# Patient Record
Sex: FEMALE | Race: OTHER | Employment: UNEMPLOYED | URBAN - METROPOLITAN AREA
[De-identification: names, ages, dates, MRNs, and addresses within clinical notes are randomized per-mention and may not be internally consistent; named-entity substitution may affect disease eponyms.]

---

## 2017-11-11 ENCOUNTER — APPOINTMENT (OUTPATIENT)
Dept: CT IMAGING | Age: 27
End: 2017-11-11
Attending: EMERGENCY MEDICINE
Payer: SUBSIDIZED

## 2017-11-11 ENCOUNTER — HOSPITAL ENCOUNTER (EMERGENCY)
Age: 27
Discharge: HOME | End: 2017-11-12
Attending: EMERGENCY MEDICINE
Payer: SUBSIDIZED

## 2017-11-11 VITALS
WEIGHT: 137.8 LBS | OXYGEN SATURATION: 99 % | TEMPERATURE: 98.5 F | SYSTOLIC BLOOD PRESSURE: 133 MMHG | RESPIRATION RATE: 18 BRPM | DIASTOLIC BLOOD PRESSURE: 89 MMHG | HEART RATE: 88 BPM

## 2017-11-11 DIAGNOSIS — G44.009 MIGRAINE-CLUSTER HEADACHE SYNDROME: Primary | ICD-10-CM

## 2017-11-11 LAB
ALBUMIN SERPL-MCNC: 3.4 G/DL (ref 3.5–5)
ALBUMIN/GLOB SERPL: 0.6 {RATIO} (ref 1.1–2.2)
ALP SERPL-CCNC: 70 U/L (ref 45–117)
ALT SERPL-CCNC: 33 U/L (ref 12–78)
ANION GAP SERPL CALC-SCNC: 11 MMOL/L (ref 5–15)
APTT PPP: 26.4 SEC (ref 22.1–32.5)
AST SERPL-CCNC: 31 U/L (ref 15–37)
BASOPHILS # BLD: 0 K/UL (ref 0–0.1)
BASOPHILS NFR BLD: 0 % (ref 0–1)
BILIRUB SERPL-MCNC: 0.3 MG/DL (ref 0.2–1)
BUN SERPL-MCNC: 10 MG/DL (ref 6–20)
BUN/CREAT SERPL: 17 (ref 12–20)
CALCIUM SERPL-MCNC: 8.5 MG/DL (ref 8.5–10.1)
CHLORIDE SERPL-SCNC: 104 MMOL/L (ref 97–108)
CO2 SERPL-SCNC: 25 MMOL/L (ref 21–32)
CREAT SERPL-MCNC: 0.59 MG/DL (ref 0.55–1.02)
EOSINOPHIL # BLD: 0.1 K/UL (ref 0–0.4)
EOSINOPHIL NFR BLD: 2 % (ref 0–7)
ERYTHROCYTE [DISTWIDTH] IN BLOOD BY AUTOMATED COUNT: 12.6 % (ref 11.5–14.5)
GLOBULIN SER CALC-MCNC: 5.3 G/DL (ref 2–4)
GLUCOSE SERPL-MCNC: 102 MG/DL (ref 65–100)
HCT VFR BLD AUTO: 38.7 % (ref 35–47)
HGB BLD-MCNC: 13.3 G/DL (ref 11.5–16)
INR PPP: 1.1 (ref 0.9–1.1)
LYMPHOCYTES # BLD: 3.5 K/UL (ref 0.8–3.5)
LYMPHOCYTES NFR BLD: 54 % (ref 12–49)
MCH RBC QN AUTO: 28.5 PG (ref 26–34)
MCHC RBC AUTO-ENTMCNC: 34.4 G/DL (ref 30–36.5)
MCV RBC AUTO: 82.9 FL (ref 80–99)
MONOCYTES # BLD: 0.5 K/UL (ref 0–1)
MONOCYTES NFR BLD: 8 % (ref 5–13)
NEUTS SEG # BLD: 2.3 K/UL (ref 1.8–8)
NEUTS SEG NFR BLD: 36 % (ref 32–75)
PLATELET # BLD AUTO: 286 K/UL (ref 150–400)
POTASSIUM SERPL-SCNC: 3.3 MMOL/L (ref 3.5–5.1)
PROT SERPL-MCNC: 8.7 G/DL (ref 6.4–8.2)
PROTHROMBIN TIME: 10.6 SEC (ref 9–11.1)
RBC # BLD AUTO: 4.67 M/UL (ref 3.8–5.2)
SODIUM SERPL-SCNC: 140 MMOL/L (ref 136–145)
THERAPEUTIC RANGE,PTTT: NORMAL SECS (ref 58–77)
WBC # BLD AUTO: 6.5 K/UL (ref 3.6–11)

## 2017-11-11 PROCEDURE — 85025 COMPLETE CBC W/AUTO DIFF WBC: CPT | Performed by: EMERGENCY MEDICINE

## 2017-11-11 PROCEDURE — 85730 THROMBOPLASTIN TIME PARTIAL: CPT | Performed by: EMERGENCY MEDICINE

## 2017-11-11 PROCEDURE — 70450 CT HEAD/BRAIN W/O DYE: CPT

## 2017-11-11 PROCEDURE — 96374 THER/PROPH/DIAG INJ IV PUSH: CPT

## 2017-11-11 PROCEDURE — 85610 PROTHROMBIN TIME: CPT | Performed by: EMERGENCY MEDICINE

## 2017-11-11 PROCEDURE — 96361 HYDRATE IV INFUSION ADD-ON: CPT

## 2017-11-11 PROCEDURE — 74011250636 HC RX REV CODE- 250/636: Performed by: EMERGENCY MEDICINE

## 2017-11-11 PROCEDURE — 99283 EMERGENCY DEPT VISIT LOW MDM: CPT

## 2017-11-11 PROCEDURE — 96375 TX/PRO/DX INJ NEW DRUG ADDON: CPT

## 2017-11-11 PROCEDURE — 36415 COLL VENOUS BLD VENIPUNCTURE: CPT | Performed by: EMERGENCY MEDICINE

## 2017-11-11 PROCEDURE — 80053 COMPREHEN METABOLIC PANEL: CPT | Performed by: EMERGENCY MEDICINE

## 2017-11-11 RX ORDER — METOCLOPRAMIDE HYDROCHLORIDE 5 MG/ML
10 INJECTION INTRAMUSCULAR; INTRAVENOUS
Status: COMPLETED | OUTPATIENT
Start: 2017-11-11 | End: 2017-11-11

## 2017-11-11 RX ORDER — ONDANSETRON 4 MG/1
4 TABLET, ORALLY DISINTEGRATING ORAL
Qty: 15 TAB | Refills: 0 | Status: SHIPPED | OUTPATIENT
Start: 2017-11-11 | End: 2019-01-29

## 2017-11-11 RX ORDER — DIPHENHYDRAMINE HYDROCHLORIDE 50 MG/ML
50 INJECTION, SOLUTION INTRAMUSCULAR; INTRAVENOUS
Status: COMPLETED | OUTPATIENT
Start: 2017-11-11 | End: 2017-11-11

## 2017-11-11 RX ORDER — NAPROXEN 500 MG/1
500 TABLET ORAL 2 TIMES DAILY WITH MEALS
Qty: 30 TAB | Refills: 0 | Status: SHIPPED | OUTPATIENT
Start: 2017-11-11 | End: 2018-05-29

## 2017-11-11 RX ORDER — KETOROLAC TROMETHAMINE 30 MG/ML
30 INJECTION, SOLUTION INTRAMUSCULAR; INTRAVENOUS
Status: COMPLETED | OUTPATIENT
Start: 2017-11-11 | End: 2017-11-11

## 2017-11-11 RX ADMIN — METOCLOPRAMIDE 10 MG: 5 INJECTION, SOLUTION INTRAMUSCULAR; INTRAVENOUS at 22:35

## 2017-11-11 RX ADMIN — SODIUM CHLORIDE 1000 ML: 900 INJECTION, SOLUTION INTRAVENOUS at 22:34

## 2017-11-11 RX ADMIN — DIPHENHYDRAMINE HYDROCHLORIDE 50 MG: 50 INJECTION, SOLUTION INTRAMUSCULAR; INTRAVENOUS at 22:37

## 2017-11-11 RX ADMIN — KETOROLAC TROMETHAMINE 30 MG: 30 INJECTION, SOLUTION INTRAMUSCULAR at 22:37

## 2017-11-12 NOTE — DISCHARGE INSTRUCTIONS
We hope that we have addressed all of your medical concerns. The examination and treatment you received in the Emergency Department were for an emergent problem and were not intended as complete care. It is important that you follow up with your healthcare provider(s) for ongoing care. If your symptoms worsen or do not improve as expected, and you are unable to reach your usual health care provider(s), you should return to the Emergency Department. Today's healthcare is undergoing tremendous change, and patient satisfaction surveys are one of the many tools to assess the quality of medical care. You may receive a survey from the [employer] regarding your experience in the Emergency Department. I hope that your experience has been completely positive, particularly the medical care that I provided. As such, please participate in the survey; anything less than excellent does not meet my expectations or intentions. [address] [place] [address] and [address] [place] [address] participate in nationally recognized quality of care measures. If your blood pressure is greater than 120/80, as reported below, we urge that you seek medical care to address the potential of high blood pressure, commonly known as hypertension. Hypertension can be hereditary or can be caused by certain medical conditions, pain, stress, or \"white coat syndrome. \"       Please make an appointment with your health care provider(s) for follow up of your Emergency Department visit. VITALS:   Patient Vitals for the past 8 hrs:   Temp Pulse Resp BP SpO2   11/11/17 2205 98.5 °F (36.9 °C) 88 18 133/89 99 %          Thank you for allowing us to provide you with medical care today. We realize that you have many choices for your emergency care needs. Please choose us in the future for any continued health care needs. Ambrose David MD    [facility], [place].   Office: [phone]            Recent Results (from the past 24 hour(s))   CBC WITH AUTOMATED DIFF    Collection Time: 11/11/17 10:31 PM   Result Value Ref Range    WBC 6.5 3.6 - 11.0 K/uL    RBC 4.67 3.80 - 5.20 M/uL    HGB 13.3 11.5 - 16.0 g/dL    HCT 38.7 35.0 - 47.0 %    MCV 82.9 80.0 - 99.0 FL    MCH 28.5 26.0 - 34.0 PG    MCHC 34.4 30.0 - 36.5 g/dL    RDW 12.6 11.5 - 14.5 %    PLATELET 436 584 - 345 K/uL    NEUTROPHILS 36 32 - 75 %    LYMPHOCYTES 54 (H) 12 - 49 %    MONOCYTES 8 5 - 13 %    EOSINOPHILS 2 0 - 7 %    BASOPHILS 0 0 - 1 %    ABS. NEUTROPHILS 2.3 1.8 - 8.0 K/UL    ABS. LYMPHOCYTES 3.5 0.8 - 3.5 K/UL    ABS. MONOCYTES 0.5 0.0 - 1.0 K/UL    ABS. EOSINOPHILS 0.1 0.0 - 0.4 K/UL    ABS. BASOPHILS 0.0 0.0 - 0.1 K/UL   METABOLIC PANEL, COMPREHENSIVE    Collection Time: 11/11/17 10:31 PM   Result Value Ref Range    Sodium 140 136 - 145 mmol/L    Potassium 3.3 (L) 3.5 - 5.1 mmol/L    Chloride 104 97 - 108 mmol/L    CO2 25 21 - 32 mmol/L    Anion gap 11 5 - 15 mmol/L    Glucose 102 (H) 65 - 100 mg/dL    BUN 10 6 - 20 MG/DL    Creatinine 0.59 0.55 - 1.02 MG/DL    BUN/Creatinine ratio 17 12 - 20      GFR est AA >60 >60 ml/min/1.73m2    GFR est non-AA >60 >60 ml/min/1.73m2    Calcium 8.5 8.5 - 10.1 MG/DL    Bilirubin, total 0.3 0.2 - 1.0 MG/DL    ALT (SGPT) 33 12 - 78 U/L    AST (SGOT) 31 15 - 37 U/L    Alk. phosphatase 70 45 - 117 U/L    Protein, total 8.7 (H) 6.4 - 8.2 g/dL    Albumin 3.4 (L) 3.5 - 5.0 g/dL    Globulin 5.3 (H) 2.0 - 4.0 g/dL    A-G Ratio 0.6 (L) 1.1 - 2.2     PROTHROMBIN TIME + INR    Collection Time: 11/11/17 10:31 PM   Result Value Ref Range    INR 1.1 0.9 - 1.1      Prothrombin time 10.6 9.0 - 11.1 sec   PTT    Collection Time: 11/11/17 10:31 PM   Result Value Ref Range    aPTT 26.4 22.1 - 32.5 sec    aPTT, therapeutic range     58.0 - 77.0 SECS       Ct Head Wo Cont    Result Date: 11/11/2017  EXAM:  CT HEAD WO CONT Clinical history: Headaches INDICATION:   Headaches COMPARISON: 8/23/2016. CONTRAST:  None. TECHNIQUE: Unenhanced CT of the head was performed using 5 mm images. Brain and bone windows were generated. CT dose reduction was achieved through use of a standardized protocol tailored for this examination and automatic exposure control for dose modulation. FINDINGS: The ventricles and sulci are normal in size, shape and configuration and midline. There is no significant white matter disease. There is no intracranial hemorrhage, extra-axial collection, mass, mass effect or midline shift. The basilar cisterns are open. No acute infarct is identified. The bone windows demonstrate no abnormalities. Right maxillary sinus disease. IMPRESSION: No acute intracranial process. If you see this message, please contact your IT team to request the [employer] Ready RTF Malay and Farsi documents.

## 2017-11-12 NOTE — ED NOTES
Care assumed. The pt., stated her headache has resolved. Family at bedside. Plan of care discussed. Will update provider.

## 2017-11-12 NOTE — ED PROVIDER NOTES
HPI Comments: The patient is a 21-year-old female with a past medical history significant for chronic headaches, presents to the ED with a complaint of intractable headaches for one week localized to the right frontal lobe area, behind the right eye, accompanied by nausea, vomiting, light sensitivity, decreased appetite and activity. The headache is different than her prior headaches. She has taken over-the-counter ibuprofen without any significant relief of symptoms. She denies any fever, cough, congestion, neck pain, back pain, chest pain, shortness of breath, abdominal pain, diarrhea, constipation, dysuria, hematuria, dizziness, extremity weakness or numbness, skin rash, sick contact. Patient is a 24 y.o. female presenting with headaches. Headache           History reviewed. No pertinent past medical history. History reviewed. No pertinent surgical history. History reviewed. No pertinent family history. Social History     Social History    Marital status:      Spouse name: N/A    Number of children: N/A    Years of education: N/A     Occupational History    Not on file. Social History Main Topics    Smoking status: Never Smoker    Smokeless tobacco: Never Used    Alcohol use No    Drug use: No    Sexual activity: Not on file     Other Topics Concern    Not on file     Social History Narrative    No narrative on file         ALLERGIES: Review of patient's allergies indicates no known allergies. Review of Systems   Neurological: Positive for headaches. All other systems reviewed and are negative. Vitals:    11/11/17 2205   BP: 133/89   Pulse: 88   Resp: 18   Temp: 98.5 °F (36.9 °C)   SpO2: 99%   Weight: 62.5 kg (137 lb 12.8 oz)            Physical Exam   Nursing note and vitals reviewed.        CONSTITUTIONAL: Well-appearing; well-nourished; in no apparent distress  HEAD: Normocephalic; atraumatic  EYES: PERRL; EOM intact; conjunctiva and sclera are clear bilaterally. ENT: No rhinorrhea; normal pharynx with no tonsillar hypertrophy; mucous membranes pink/moist, no erythema, no exudate. NECK: Supple; non-tender; no cervical lymphadenopathy  CARD: Normal S1, S2; no murmurs, rubs, or gallops. Regular rate and rhythm. RESP: Normal respiratory effort; breath sounds clear and equal bilaterally; no wheezes, rhonchi, or rales. ABD: Normal bowel sounds; non-distended; non-tender; no palpable organomegaly, no masses, no bruits. Back Exam: Normal inspection; no vertebral point tenderness, no CVA tenderness. Normal range of motion. EXT: Normal ROM in all four extremities; non-tender to palpation; no swelling or deformity; distal pulses are normal, no edema. SKIN: Warm; dry; no rash. NEURO:Alert and oriented x 3, coherent, MAUREEN-XII grossly intact, sensory and motor are non-focal.        MDM  Number of Diagnoses or Management Options  Diagnosis management comments: Assessment: 29-year-old female with atypical headaches , suspect migraine variant rule out ICH. The patient is hemodynamically stable. She has a benign exam with stable vital signs and no deficit at this time.     Plan: Education and reassurance/ lab/ IV fluid/ CT scan of the head/ symptomatic treatment with Tylenol, Reglan, and Benadryl/ serial exam/ monitor and reevaluate       Amount and/or Complexity of Data Reviewed  Clinical lab tests: ordered and reviewed  Tests in the radiology section of CPT®: ordered and reviewed  Tests in the medicine section of CPT®: reviewed and ordered  Discussion of test results with the performing providers: yes  Decide to obtain previous medical records or to obtain history from someone other than the patient: yes  Obtain history from someone other than the patient: yes  Review and summarize past medical records: yes  Discuss the patient with other providers: yes  Independent visualization of images, tracings, or specimens: yes    Risk of Complications, Morbidity, and/or Mortality  Presenting problems: moderate  Diagnostic procedures: moderate  Management options: moderate    Patient Progress  Patient progress: stable    ED Course       Procedures     Progress Note:   Pt has been reexamined by Arik Bacon MD. Pt is feeling much better. Symptoms have improved. All available results have been reviewed with pt and any available family. Pt understands sx, dx, and tx in ED. Care plan has been outlined and questions have been answered. Pt is ready to go home. Will send home on  Migraine headaches instruction. Prescriptions of Zofran and naproxen. Outpatient referral with PCP/ neurology as needed. Written by Arik Bacon MD,12:01 AM    .   .

## 2017-11-12 NOTE — ED TRIAGE NOTES
TRIAGE NOTE: Pt arrives for headache since Tuesday with pain behind right eye with vomiting \"every time I eat\".   Primary language is Chinese triage completed via

## 2017-11-12 NOTE — ED NOTES
Pt., discharged with instructions and follow up information. IV removed intact. Prescriptions discussed and  verbalized understanding.

## 2017-12-07 ENCOUNTER — HOSPITAL ENCOUNTER (EMERGENCY)
Age: 27
Discharge: HOME | End: 2017-12-07
Attending: EMERGENCY MEDICINE
Payer: SUBSIDIZED

## 2017-12-07 VITALS
DIASTOLIC BLOOD PRESSURE: 67 MMHG | HEART RATE: 78 BPM | SYSTOLIC BLOOD PRESSURE: 125 MMHG | OXYGEN SATURATION: 99 % | RESPIRATION RATE: 18 BRPM | WEIGHT: 141 LBS | TEMPERATURE: 98.3 F

## 2017-12-07 DIAGNOSIS — R51.9 HEADACHE ABOVE THE EYE REGION: Primary | ICD-10-CM

## 2017-12-07 PROCEDURE — 74011250636 HC RX REV CODE- 250/636: Performed by: EMERGENCY MEDICINE

## 2017-12-07 PROCEDURE — 74011000258 HC RX REV CODE- 258: Performed by: EMERGENCY MEDICINE

## 2017-12-07 PROCEDURE — 99283 EMERGENCY DEPT VISIT LOW MDM: CPT

## 2017-12-07 PROCEDURE — 96374 THER/PROPH/DIAG INJ IV PUSH: CPT

## 2017-12-07 RX ORDER — BUTALBITAL, ACETAMINOPHEN AND CAFFEINE 300; 40; 50 MG/1; MG/1; MG/1
1 CAPSULE ORAL
Qty: 15 CAP | Refills: 0 | Status: SHIPPED | OUTPATIENT
Start: 2017-12-07 | End: 2019-01-29

## 2017-12-07 RX ADMIN — PROCHLORPERAZINE EDISYLATE 10 MG: 5 INJECTION INTRAMUSCULAR; INTRAVENOUS at 10:49

## 2017-12-07 NOTE — ED NOTES
MD reviewed discharge instructions and options with patient and patient verbalized understanding. RN reviewed discharge instructions using teachback method. Pt ambulated to exit without difficulty and in no signs of acute distress escorted by self, and he  will drive home. No complaints or needs expressed at this time. Patient was counseled on medications prescribed at discharge. VSS, verbalized relief from most intense pain. Patient to call Dr. Amy Robins in the morning for appointment.

## 2017-12-07 NOTE — ED TRIAGE NOTES
TRIAGE: Patient arrives ambulatory from home with a headache. Seen 11/11 and prescribed Naproxen and Zofran for headaches. She feels as though this is not helping. Has not taken a dose today. + Nausea.

## 2017-12-07 NOTE — DISCHARGE INSTRUCTIONS
ÇáÕÏÇÚ: ÅÑÔÇÏÇÊ ÇáÑÚÇíÉ  [ Headache: Care Instructions ]  ÅÑÔÇÏÇÊ ÇáÑÚÇíÉ    ááÕÏÇÚ ÇáÚÏíÏ ãä ÇáÃÓÈÇÈ VUBLFSFD. áÇ ÊÚÏ ãÚÙã ÍÇáÇÊ ÇáÕÏÇÚ ÚáÇãÉ Úáì ãÔßáÉ ÃßËÑ ÎØæÑÉ æÊÊÍÓä ÈãÝÑÏåÇ Ïæä ÊÏÎá. ÞÏ íÓÇÚÏß ÇáÚáÇÌ ÇáãäÒáí Úáì ÇáÔÚæÑ LQTQESS ÈÔßá ÃÓÑÚ. áÞÏ ÝÍÕß ÇáØÈíÈ ÝÍÕðÇ ÏÞíÞðÇ¡ æáßä ÞÏ ÊÊØæÑ ÇáÃÚÑÇÖ áÇÍÞðÇ. ÝÅÐÇ ãÇ áÇÍÙÊ ÃíÉ UGUHLT Ãæ ÃÚÑÇÖ ÌÏíÏÉ¡ ÝÇØáÈ IVQEDK ÇáØÈí ÝæÑðÇ. Åä ãÊÇÈÚÉ ÇáÑÚÇíÉ ÌÒÁ ÑÆíÓí Ýí ÚáÇÌß æÓáÇãÊß. ÝÇÍÑÕ Úáì ÅÌÑÇÁ ÊÑÊíÈÇÊ ÌãíÚ ãæÇÚíÏ ÒíÇÑÉ ÇáØÈíÈ æÇáÊÒã ÈÊáß ÇáãæÇÚíÏ æÇÊÕá ÈØÈíÈß ÅÐÇ ßÇäÊ áÏíß ãÔßáÇÊ. æãä ÇáÃÝßÇÑ ÇáÌíÏÉ ÃíÖðÇ ãÚÑÝÉ äÊÇÆÌ EEJWDJXJ ALVVYHWOC ÈÞÇÆãÉ XXFECEQI ÇáÊí JFOBWUIW. ßíÝ ÊÚÊäí ÈäÝÓß Ýí ZWFLWZ¿   · áÇ ÊÞõÏ ÇáÓíÇÑÉ ÅÐÇ ßäÊ ÞÏ ÊäÇæáÊ ÏæÇÁð ãä ÃÏæíÉ ÊÓßíä ÇáÃáã ÇáÊí ÊõÕÑÝ ÈæÕÝÉ ØÈíÉ.  · ÇÓÊÑÍ Ýí ÛÑÝÉ åÇÏÆÉ æãäÎÝÖÉ ÇáÅÖÇÁÉ Åáì Ãä íÐåÈ ÇáÕÏÇÚ. ÃÛáÞ Úíäíß æÍÇæá ÇáÇÓÊÑÎÇÁ Ãæ ÇÎáÏ ááäæã. áÇ ÊÞÑÃ Ãæ ÊÔÇåÏ ÇáÊáÝÇÒ.  · ÖÚ ÞØÚÉ ÞãÇÔ ÈÇÑÏÉ æÑØÈÉ Ãæ ßãÇÏÉ Úáì ãäØÞÉ ÇáÃáã áãÏÉ ãä 10 ÏÞÇÆÞ Åáì 20 ÏÞíÞÉ Ýí ÇáãÑÉ ÇáæÇÍÏÉ. ÖÚ ÞØÚÉ ÞãÇÔ ÑÞíÞÉ Èíä ÇáßãÇÏÉ æÌáÏß.  · ÇÓÊÎÏã ãäÔÝÉ ÏÇÝÆÉ æÑÇØÈÉ Ãæ æÓÇÏÉ ÍÑÇÑíÉ Úáì ÇáæÖÚ ÇáãäÎÝÖ áÅÑÎÇÁ ÚÖáÇÊ ÇáßÊÝ æÇáÑÞÈÉ ÇáãÔÏæÏÉ.  · ÇÌÚá ÃÍÏ ÇáÃÔÎÇÕ íÞæã ÈÊÏáíß ÑÞÈÊß æßÊÝíß ÈÑÝÞ.  · ÊäÇæá ÃÏæíÉ ÊÓßíä ÇáÃáã ÈÏÞÉ æÝÞ ÅÑÔÇÏÇÊ ÇáØÈíÈ. o o ÅÐÇ ÃÚØÇß ÇáØÈíÈ ÏæÇÁð ãä ÇáÃÏæíÉ ÇáÊí ÊõÕÑÝ ÈæÕÝÉ ØÈíÉ áÚáÇÌ ÇáÃáã¡ GPOYRLH æÝÞ ÅÑÔÇÏÇÊå. o o ÅÐÇ áã ÊÃÎÐ ÏæÇÁð áÚáÇÌ ÇáÃáã ãä ÇáÃÏæíÉ ÇáÊí ÊõÕÑÝ ÈæÕÝÉ ØÈíÉ¡ ÝÓá ØÈíÈß ÅÐÇ ßÇä ÈÅãßÇäß ÃÎÐ ÏæÇÁ ãä ÇáÃÏæíÉ ÇáÊí ÊõÕÑÝ ÈÏæä æÕÝÉ ØÈíÉ.  · ÊæÎøó ÇáÍÐÑ æáÇ ÊÃÎÐ ÃÏæíÉ ÊÎÝíÝ ÇáÃáã ÈãÇ íÒíÏ ÚãÇ ÊÓãÍ Èå EYQOCORGY¡ ÍíË ÞÏ íÊÝÇÞã ÇáÕÏÇÚ Ãæ íÊßÑÑ ÚäÏãÇ íÞá ÊÃËíÑ ÇáÏæÇÁ ÊÏÑíÌíðÇ.  · áÇ ÊÊÌÇåá ÇáÃÚÑÇÖ ÇáÌÏíÏÉ ÇáÊí ÊÍÏË ãÚ ÇáÕÏÇÚ ãËá ÇáÍãì Ãæ ÇáÖÚÝ Ãæ ÇáÊäãíá Ãæ ÊÛíÑÇÊ ÇáÑÄíÉ Ãæ ÇáÇÑÊÈÇß. ÍíË ÅäåÇ ÞÏ ÊãËá ÚáÇãÇÊ áãÔßáÉ ÃßËÑ ÎØæÑÉ.  ááæÞÇíÉ ãä ÇáÕÏÇÚ   · ÇÍÊÝÙ ÈíæãíÉ ááÕÏÇÚ ÈÍíË íãßäß ÊÍÏíÏ ãËíÑÇÊ ÇáÕÏÇÚ. ÞÏ íÓÇÚÏß ÊÌäÈ ÇáãËíÑÇÊ Ýí ÇáæÞÇíÉ ãä ÇáÕÏÇÚ.  ÓÌøöá æÞÊ ÈÏÇíÉ ßá ÕÏÇÚ æãÏÉ WYYLLSRD æÇáÔßá ÇáÐí Úáíå (äÇÈÖ Ãæ ãÄáã Ãæ æÎÒí Ãæ ÛíÑ ÍÇÏ). ÓÌøöá ÃíÉ ÃÚÑÇÖ ÃÎÑì ÊÕÇÍÈ ÇáÕÏÇÚ ãËá ÇáÛËíÇä Ãæ ÇáÃÖæÇÁ ÇáæÇãÖÉ Ãæ ÇáÈÞÚ ÇáÓæÏÇÁ Ãæ ÇáÍÓÇÓíÉ ááÖæÁ ÇáÓÇØÚ Ãæ ÇáÖÌíÌ. áÇÍÙí ÅÐÇ ãÇ ßäÊ ÊÕÇÈíä ÈÇáÕÏÇÚ ÚäÏãÇ íÞÊÑÈ ãæÚÏ ÏæÑÊß ÇáÔåÑíÉ (PGYMZCA ááäÓÇÁ). ÓÌøöá ÇáÃÔíÇÁ ÇáÊí ÞÏ ÊËíÑ ÇáÕÏÇÚ ãËá ÇáÃØÚãÉ Ãæ ÇáÑæÇÆÍ ÇáãÚíäÉ ([id number] æÇáÌÈä æÇáäÈíÐ) Ãæ ÇáÏÎÇä Ãæ ÇáÅÖÇÁÉ ÇáÓÇØÚÉ Ãæ ÇáÖÛØ Ãæ ÞáÉ Çáäæã.  · ÇßÊÔÝ ÇáØÑÞ ÇáÕÍíÉ ááÊÚÇãá ãÚ ÇáÖÛæØ. íÍÏË ÇáÕÏÇÚ ÈÔßá ÔÇÆÚ ÃËäÇÁ ÇáÃæÞÇÊ ÇáÚÕíÈÉ Ãæ ÈÚÏåÇ ãÈÇÔÑÉ. ÎÐ æÞÊß ááÇÓÊÑÎÇÁ ÞÈá æÈÚÏ Ãä ÊÞæã ÈÝÚá ÔíÁ ÞÏ ÓÈÈ áß ÕÏÇÚðÇ Ýí ÇáãÇÖí.  · ÍÇæá ÇáÍÝÇÙ Úáì ÇÓÊÑÎÇÁ ÚÖáÇÊß ÈÇáÍÝÇÙ Úáì æÖÚíÉ ÌíÏÉ ááÌÓã. ÇÝÍÕ ÚÖáÇÊ ÇáÝß æÇáæÌå æÇáÑÞÈÉ æÇáßÊÝ áãÚÑÝÉ ãÇ ÅÐÇ ßÇä ÈåÇ ÔÏ æÍÇæá ÅÑÎÇÁåÇ. ÚäÏ ÇáÌáæÓ Úáì ãßÊÈ¡ ÛíøöÑ ÚÇÏÉó æÖÚíÇÊ ÌÓãß ÈÔßá ãÊßÑÑ æÞã ÈÅÌÑÇÁ ÊãÑíä VPBYGDPOL áãÏÉ 30 ËÇäíÉ ßá ÓÇÚÉ.  · ÇÍÕá Úáì ÞÓØ æÇÝÑ ãä Çáäæã æÇáÊãÇÑíä.  · ÊäÇæá ÇáØÚÇã ÈÇäÊÙÇã æÈÔßá ÌíÏ. íãßä Ãä ÊÍÝÒ ÇáÝÊÑÇÊ ÇáØæíáÉ ÈÏæä ØÚÇã ÍÏæË ÇáÕÏÇÚ.  · ÚÇáÌ äÝÓß OYYDRQEN. íÌÏ ÈÚÖ ÇáäÇÓ Ãä ÇáÊÏáíß ÇáãäÊÙã ãÝíÏ ÌÏðÇ Ýí ÊÎÝíÝ ÇáÊæÊÑ.  · Þáøöá ãä ÇáßÇÝííä ÈÚÏã ÊäÇæá ßãíÇÊ ßÈíÑÉ ãä ÇáÞåæÉ Ãæ ÇáÔÇí Ãæ ÇáÕæÏÇ. æáßä áÇ ÊÞáÚ Úä ÇáßÇÝííä ÝÌÃÉ¡ áÃä åÐÇ ÃíÖðÇ ÞÏ íÓÈÈ áß ÕÏÇÚðÇ.  · Þáøöá ãä ÅÌåÇÏ ÇáÚíä ÇáäÇÊÌ Úä ÃÔÚÉ ÇáßãÈíæÊÑ ÈÇáæãíÖ ÇáãÊßÑÑ æÇáäÙÑ ÈÚíÏðÇ Úä ÔÇÔÉ ÇáßãÈíæÊÑ Èíä ÇáÍíä æÇáÂÎÑ. ÊÃßÏ ãä ÇÑÊÏÇÁ ÇáäÙÇÑÉ ÇáãäÇÓÈÉ æãä Ãä ÚÇÑÖ ÇáÔÇÔÉ ãËÈÊ LHEEQV ÇáãáÇÆã Úáì ÈÚÏ Øæáå ÐÑÇÚ ÊÞÑíÈðÇ.  · [address] YRFPNCHB ÅÐÇ ßäÊ ãÕÇÈðÇ XFIGEMPNF Ãæ ÇáÞáÞ. ÝÞÏ íßæä ÇáÕÏÇÚ ÇáÐí ÊÚÇäí ãäå ãÑÊÈØðÇ ÈåÐå OVDUOTG. íãßä Ãä íÄÏí WUFABJ Åáì ßáò ãä ÇáæÞÇíÉ ãä ÇáÕÏÇÚ æÇáãÓÇÚÏÉ Ýí ÇáÊÎáÕ ãä ÃÚÑÇÖ ÇáÞáÞ Ãæ ÇáÇßÊÆÇÈ. ãÊì íÌÈ NZIXOER áØáÈ ÇáãÓÇÚÏÉ¿  ÇÊÕá ÈÇáÑÞã 911 Ýí Ãí æÞÊ ÊÑì Ýíå ÍÇÌÊß Åáì ÑÚÇíÉ ØÇÑÆÉ. ÝãËáÇð¡ ÇÊÕá Ýí ÍÇáÉ:   · ÙåÑÊ Úáíß ÚáÇãÇÊ ÇáÓßÊÉ ÇáÏãÇÛíÉ. æÊÔãá åÐå ÇáÂËÇÑ ÇáÌÇäÈíÉ ãÇ íáí:   o o AOENRBI Ãæ ÔááÇð Ãæ ÖÚÝðÇ ãÝÇÌÆóÇ Ýí ÇáæÌå Ãæ ÇáÐÑÇÚ Ãæ ÇáÓÇÞ ÎÇÕÉ Úáì ÌÇäÈ æÇÍÏ ÝÞØ ãä ÌÓãß. o o ÊÛíÑÇÊ ãÝÇÌÆÉ Ýí ÇáÑÄíÉ. o o ãÔßáÇÊ ãÝÇÌÆÉ Ýí ÇáÊÍÏË.   o o PSDICJEM Ãæ DCQFWCIA ãÝÇÌÆ Ýí Ýåã ROBBIN ÇáÈÓíØÉ. o o ãÔßáÇÊ ãÝÇÌÆÉ Ýí ÇáãÔí Ãæ ÇáÊæÇÒä. o o ÕÏÇÚðÇ ãÝÇÌÆðÇ æÍÇÏðÇ íÎÊáÝ Úä ÃÔßÇá ÇáÕÏÇÚ ÇáÓÇÈÞÉ. ÇÊÕá ÈØÈíÈß ÇáÂä Ãæ ÇÈÍË Úä ÇáÑÚÇíÉ ÇáØÈíÉ ÇáÝæÑíÉ Ýí DUGGXTK ÇáÊÇáíÉ:   · ÅÕÇÈÊß ÈäæÈÉ ÕÏÇÚ ÌÏíÏÉ Ãæ ÊÝÇÞã ÍÏÉ ÇáÕÏÇÚ.  · ÊÝÇÞã ÇáÕÏÇÚ. Ãíä íãßä ãÚÑÝÉ ÇáãÒíÏ¿  ÇäÊÞÇá Åáì [email or web address] ÏÎæá M271 íãßäß ãÚÑÝÉ ÇáãÒíÏ ãä ÎáÇá ãÑÈÚ ÇáÈÍË \"ÇáÕÏÇÚ: ÅÑÔÇÏÇÊ ÇáÑÚÇíÉ - [ Headache: Care Instructions ]. \"  © 5725-1434 [employer], [employer]. ÊãÊ ÊåíÆÉ ÅÑÔÇÏÇÊ ÇáÚäÇíÉ ÈãæÌÈ ÊÑÎíÕ ãä ãÎÊÕ ÇáÑÚÇíÉ ÇáÕÍíÉ áÏíß. ÅÐÇ ßÇäÊ áÏíß ÃíÉ VHUUFDTVG Úä ÍÇáÉ ØÈíÉ Ãæ Ãí ãä åÐå DXQOJWWBE¡ ÝÊæÌå ÏæãðÇ HWRTOVF Åáì ãÎÊÕ ÇáÑÚÇíÉ ÇáÕÍíÉ. ÊäÝí ãäÙãÉ [employer] Karen Vargas Ãæ Carlos Pop [id number] åÐå EOVNKIDOI.   ÅÕÏÇÑ ÇáãÍÊæì: 11.4 ãÍÏøË ÇÚÊÈÇÑðÇ ãä: 13 ãÍÑã 1438

## 2017-12-07 NOTE — ED PROVIDER NOTES
HPI Comments: 24 y.o. female with no significant past medical history who presents from home via private vehicle with chief complaint of headache. Pt reports that she was seen in the ED ~ 1 month ago with HA and was discharged home on Zofran and naproxen. She reports improvement and that that HA resolved, but she has had return of similar sx yesterday. Pt notes her current HA is right frontal and constant. Pain is unrelieved with naproxen and Zofran. Pt denies that she frequently gets HA's. Pt denies any syncope, rhinorrhea, fever, or cough. There are no other acute medical concerns at this time. Social hx: Never smoker. No alcohol use. PCP: Gisel Mcclelland MD    Chart Review: Pt was seen in the ED on 11/11/17. Head CT at that time showed no acute intracranial process. Note written by Brett Wade, as dictated by Tone Maza MD 10:00 AM      The history is provided by the patient. A  was used (Wannyi (Mongolian)). History reviewed. No pertinent past medical history. History reviewed. No pertinent surgical history. History reviewed. No pertinent family history. Social History     Social History    Marital status:      Spouse name: N/A    Number of children: N/A    Years of education: N/A     Occupational History    Not on file. Social History Main Topics    Smoking status: Never Smoker    Smokeless tobacco: Never Used    Alcohol use No    Drug use: No    Sexual activity: Not on file     Other Topics Concern    Not on file     Social History Narrative         ALLERGIES: Review of patient's allergies indicates no known allergies. Review of Systems    Vitals:    12/07/17 0901   BP: 124/86   Pulse: 91   Resp: 18   Temp: 98.1 °F (36.7 °C)   SpO2: 99%   Weight: 64 kg (141 lb)            Physical Exam   Constitutional: She appears well-developed and well-nourished. HENT:   Head: Normocephalic and atraumatic.    Mouth/Throat: Oropharynx is clear and moist.   Eyes: EOM are normal. Pupils are equal, round, and reactive to light. Neck: Normal range of motion. Neck supple. Cardiovascular: Normal rate, regular rhythm, normal heart sounds and intact distal pulses. Exam reveals no gallop and no friction rub. No murmur heard. Pulmonary/Chest: Effort normal. No respiratory distress. She has no wheezes. She has no rales. Abdominal: Soft. There is no tenderness. There is no rebound. Musculoskeletal: Normal range of motion. She exhibits no tenderness. Neurological: She is alert. No cranial nerve deficit. Motor; symmetric   Skin: No erythema. Psychiatric: She has a normal mood and affect. Her behavior is normal.   Nursing note and vitals reviewed. Note written by Brett Gomez, as dictated by Yenny Rose MD 10:04 AM      [facility]  ED Course       Procedures    PROGRESS NOTE:  11:17 AM  Pt states that her HA feels better and she would like to go home at this time.

## 2018-02-20 ENCOUNTER — HOSPITAL ENCOUNTER (EMERGENCY)
Age: 28
Discharge: HOME | End: 2018-02-20
Attending: EMERGENCY MEDICINE
Payer: SUBSIDIZED

## 2018-02-20 VITALS
TEMPERATURE: 98.4 F | SYSTOLIC BLOOD PRESSURE: 118 MMHG | BODY MASS INDEX: 27.84 KG/M2 | HEART RATE: 89 BPM | WEIGHT: 141.8 LBS | DIASTOLIC BLOOD PRESSURE: 85 MMHG | OXYGEN SATURATION: 99 % | HEIGHT: 60 IN | RESPIRATION RATE: 16 BRPM

## 2018-02-20 DIAGNOSIS — K02.9 PAIN DUE TO DENTAL CARIES: Primary | ICD-10-CM

## 2018-02-20 PROCEDURE — 99283 EMERGENCY DEPT VISIT LOW MDM: CPT

## 2018-02-20 RX ORDER — IBUPROFEN 600 MG/1
600 TABLET ORAL
Qty: 20 TAB | Refills: 0 | Status: SHIPPED | OUTPATIENT
Start: 2018-02-20 | End: 2018-05-29

## 2018-02-20 RX ORDER — PENICILLIN V POTASSIUM 500 MG/1
500 TABLET, FILM COATED ORAL 4 TIMES DAILY
Qty: 28 TAB | Refills: 0 | Status: SHIPPED | OUTPATIENT
Start: 2018-02-20 | End: 2018-02-27

## 2018-02-20 NOTE — ED PROVIDER NOTES
Patient is a 24 y.o. female presenting with dental problem. Dental Pain         Pt c/po left lower dental pain for 3-4 days. Denies any fever, difficulty breathing, difficulty swallowing, SOB or chest pain. Denies any dental injury. Denies any facial swelling or facial redness. Denies any bleeding gums. Pt states that she has not been to a dentist in years. Pt. Reports that she has not had any medications today prior to arrival.      Past Medical History:   Diagnosis Date    Headache        Past Surgical History:   Procedure Laterality Date    HX GYN               History reviewed. No pertinent family history. Social History     Social History    Marital status:      Spouse name: N/A    Number of children: N/A    Years of education: N/A     Occupational History    Not on file. Social History Main Topics    Smoking status: Never Smoker    Smokeless tobacco: Never Used    Alcohol use No    Drug use: No    Sexual activity: Not on file     Other Topics Concern    Not on file     Social History Narrative         ALLERGIES: Review of patient's allergies indicates no known allergies. Review of Systems   Constitutional: Negative for activity change and appetite change. HENT: Positive for dental problem. Negative for facial swelling, sore throat and trouble swallowing. Eyes: Negative. Respiratory: Negative for shortness of breath. Cardiovascular: Negative. Gastrointestinal: Negative for abdominal pain, diarrhea and vomiting. Genitourinary: Negative for dysuria. Musculoskeletal: Negative for back pain and neck pain. Skin: Negative for color change. Neurological: Negative for headaches. Psychiatric/Behavioral: Negative.         Vitals:    18 1040   BP: 118/85   Pulse: 89   Resp: 16   Temp: 98.4 °F (36.9 °C)   SpO2: 99%   Weight: 64.3 kg (141 lb 12.8 oz)   Height: 5' (1.524 m)            Physical Exam   Constitutional: She is oriented to person, place, and time. She appears well-nourished. [place] female; non smoker   HENT:   Head: Normocephalic. Mouth/Throat: Oropharynx is clear and moist.   Reports lower left dental pain; multiple teeth are missing; remaining teeth are in poor repair   Eyes: Pupils are equal, round, and reactive to light. Neck: Normal range of motion. Neck supple. Cardiovascular: Normal rate and regular rhythm. Pulmonary/Chest: Effort normal and breath sounds normal.   Abdominal: Bowel sounds are normal.   Musculoskeletal: Normal range of motion. Lymphadenopathy:     She has no cervical adenopathy. Neurological: She is alert and oriented to person, place, and time. Skin: Skin is warm and dry. No rash noted. Nursing note and vitals reviewed. [facility]      ED Course       Procedures         Mouth care and emergency dental care sheets were reviewed with the pt.  11:10 AM  Patient's results and plan of care have been reviewed with her and her . Patient and/or family have verbally conveyed their understanding and agreement of the patient's signs, symptoms, diagnosis, treatment and prognosis and additionally agree to follow up as recommended or return to the Emergency Room should her condition change prior to follow-up. Discharge instructions have also been provided to the patient with some educational information regarding her diagnosis as well a list of reasons why she would want to return to the ER prior to her  follow-up appointment should her condition change. Dony Norton NP

## 2018-02-20 NOTE — DISCHARGE INSTRUCTIONS
Dental Pain: After Your Visit  Your Care Instructions  The most common cause of dental pain is tooth decay. It can also be caused by an infection of the tooth (abscess) or gum, a tooth that has not broken all the way through the gum (impacted tooth), or a problem with the nerve-filled center of the tooth. Follow-up care is a key part of your treatment and safety. Be sure to make and go to all appointments, and call your doctor if you are having problems. Its also a good idea to know your test results and keep a list of the medicines you take. How can you care for yourself at home? · Contact a dentist for follow-up care. · Put ice or a cold pack on the outside of your mouth for 10 to 20 minutes at a time to reduce pain and swelling. Put a thin cloth between the ice and your skin. · Take an over-the-counter pain medicine, such as acetaminophen (Tylenol), ibuprofen (Advil, Motrin), or naproxen (Aleve). Read and follow all instructions on the label. · Do not take two or more pain medicines at the same time unless the doctor told you to. Many pain medicines have acetaminophen, which is Tylenol. Too much acetaminophen (Tylenol) can be harmful. · Rinse your mouth with warm salt water every 2 hours to help relieve pain and swelling from an infected tooth. Mix 1 teaspoon of salt in 8 ounces of water. · If your doctor prescribed antibiotics, take them as directed. Do not stop taking them just because you feel better. You need to take the full course of antibiotics. When should you call for help? Call your doctor now or seek immediate medical care if:  · You have signs of infection, such as:  ¨ Increased pain, swelling, warmth, or redness. ¨ Pus draining from the gum, tooth, or face. ¨ A fever. Watch closely for changes in your health, and be sure to contact your doctor if:  · You do not get better as expected. Where can you learn more?    Go to [email or web address]  Enter O364 in the search box to learn more about \"Dental Pain: After Your Visit. \"   © 9585-2924 [employer]. Care instructions adapted under license by [employer] (which disclaims liability or warranty for this information). This care instruction is for use with your licensed healthcare professional. If you have questions about a medical condition or this instruction, always ask your healthcare professional. [address] any warranty or liability for your use of this information. Content Version: 99.2.762926;  Last Revised: May 17, 2013

## 2018-05-29 ENCOUNTER — HOSPITAL ENCOUNTER (EMERGENCY)
Age: 28
Discharge: HOME | End: 2018-05-29
Attending: STUDENT IN AN ORGANIZED HEALTH CARE EDUCATION/TRAINING PROGRAM | Admitting: STUDENT IN AN ORGANIZED HEALTH CARE EDUCATION/TRAINING PROGRAM
Payer: MEDICAID

## 2018-05-29 ENCOUNTER — APPOINTMENT (OUTPATIENT)
Dept: ULTRASOUND IMAGING | Age: 28
End: 2018-05-29
Attending: STUDENT IN AN ORGANIZED HEALTH CARE EDUCATION/TRAINING PROGRAM
Payer: MEDICAID

## 2018-05-29 VITALS
BODY MASS INDEX: 27.26 KG/M2 | WEIGHT: 144.38 LBS | HEART RATE: 87 BPM | OXYGEN SATURATION: 99 % | DIASTOLIC BLOOD PRESSURE: 69 MMHG | RESPIRATION RATE: 18 BRPM | TEMPERATURE: 98.9 F | HEIGHT: 61 IN | SYSTOLIC BLOOD PRESSURE: 118 MMHG

## 2018-05-29 DIAGNOSIS — O46.8X1 SUBCHORIONIC HEMORRHAGE OF PLACENTA IN FIRST TRIMESTER, SINGLE OR UNSPECIFIED FETUS: ICD-10-CM

## 2018-05-29 DIAGNOSIS — O41.8X10 SUBCHORIONIC HEMORRHAGE OF PLACENTA IN FIRST TRIMESTER, SINGLE OR UNSPECIFIED FETUS: ICD-10-CM

## 2018-05-29 DIAGNOSIS — Z32.01 PREGNANCY TEST PERFORMED, PREGNANCY CONFIRMED: Primary | ICD-10-CM

## 2018-05-29 DIAGNOSIS — O20.0 THREATENED ABORTION: ICD-10-CM

## 2018-05-29 LAB
ABO + RH BLD: NORMAL
ALBUMIN SERPL-MCNC: 3.5 G/DL (ref 3.5–5)
ALBUMIN/GLOB SERPL: 0.7 {RATIO} (ref 1.1–2.2)
ALP SERPL-CCNC: 79 U/L (ref 45–117)
ALT SERPL-CCNC: 25 U/L (ref 12–78)
ANION GAP SERPL CALC-SCNC: 9 MMOL/L (ref 5–15)
APPEARANCE UR: ABNORMAL
AST SERPL-CCNC: 27 U/L (ref 15–37)
BACTERIA URNS QL MICRO: ABNORMAL /HPF
BASOPHILS # BLD: 0 K/UL (ref 0–0.1)
BASOPHILS NFR BLD: 1 % (ref 0–1)
BILIRUB SERPL-MCNC: 0.3 MG/DL (ref 0.2–1)
BILIRUB UR QL: NEGATIVE
BLOOD BANK CMNT PATIENT-IMP: NORMAL
BUN SERPL-MCNC: 11 MG/DL (ref 6–20)
BUN/CREAT SERPL: 22 (ref 12–20)
CALCIUM SERPL-MCNC: 8.8 MG/DL (ref 8.5–10.1)
CAOX CRY URNS QL MICRO: ABNORMAL
CHLORIDE SERPL-SCNC: 106 MMOL/L (ref 97–108)
CO2 SERPL-SCNC: 22 MMOL/L (ref 21–32)
COLOR UR: ABNORMAL
CREAT SERPL-MCNC: 0.49 MG/DL (ref 0.55–1.02)
DIFFERENTIAL METHOD BLD: NORMAL
EOSINOPHIL # BLD: 0.1 K/UL (ref 0–0.4)
EOSINOPHIL NFR BLD: 2 % (ref 0–7)
EPITH CASTS URNS QL MICRO: ABNORMAL /LPF
ERYTHROCYTE [DISTWIDTH] IN BLOOD BY AUTOMATED COUNT: 12.8 % (ref 11.5–14.5)
GLOBULIN SER CALC-MCNC: 5.2 G/DL (ref 2–4)
GLUCOSE SERPL-MCNC: 79 MG/DL (ref 65–100)
GLUCOSE UR STRIP.AUTO-MCNC: NEGATIVE MG/DL
HCG SERPL-ACNC: 2797 MIU/ML (ref 0–6)
HCG UR QL: POSITIVE
HCT VFR BLD AUTO: 37.3 % (ref 35–47)
HGB BLD-MCNC: 12.4 G/DL (ref 11.5–16)
HGB UR QL STRIP: ABNORMAL
IMM GRANULOCYTES # BLD: 0 K/UL (ref 0–0.04)
IMM GRANULOCYTES NFR BLD AUTO: 0 % (ref 0–0.5)
KETONES UR QL STRIP.AUTO: NEGATIVE MG/DL
LEUKOCYTE ESTERASE UR QL STRIP.AUTO: ABNORMAL
LYMPHOCYTES # BLD: 2.7 K/UL (ref 0.8–3.5)
LYMPHOCYTES NFR BLD: 49 % (ref 12–49)
MCH RBC QN AUTO: 28.4 PG (ref 26–34)
MCHC RBC AUTO-ENTMCNC: 33.2 G/DL (ref 30–36.5)
MCV RBC AUTO: 85.4 FL (ref 80–99)
MONOCYTES # BLD: 0.5 K/UL (ref 0–1)
MONOCYTES NFR BLD: 8 % (ref 5–13)
NEUTS SEG # BLD: 2.2 K/UL (ref 1.8–8)
NEUTS SEG NFR BLD: 40 % (ref 32–75)
NITRITE UR QL STRIP.AUTO: NEGATIVE
NRBC # BLD: 0 K/UL (ref 0–0.01)
NRBC BLD-RTO: 0 PER 100 WBC
PH UR STRIP: 6 [PH] (ref 5–8)
PLATELET # BLD AUTO: 227 K/UL (ref 150–400)
PMV BLD AUTO: 9.7 FL (ref 8.9–12.9)
POTASSIUM SERPL-SCNC: 3.8 MMOL/L (ref 3.5–5.1)
PROT SERPL-MCNC: 8.7 G/DL (ref 6.4–8.2)
PROT UR STRIP-MCNC: NEGATIVE MG/DL
RBC # BLD AUTO: 4.37 M/UL (ref 3.8–5.2)
RBC #/AREA URNS HPF: ABNORMAL /HPF (ref 0–5)
SODIUM SERPL-SCNC: 137 MMOL/L (ref 136–145)
SP GR UR REFRACTOMETRY: 1.02 (ref 1–1.03)
UR CULT HOLD, URHOLD: NORMAL
UROBILINOGEN UR QL STRIP.AUTO: 0.2 EU/DL (ref 0.2–1)
WBC # BLD AUTO: 5.5 K/UL (ref 3.6–11)
WBC URNS QL MICRO: ABNORMAL /HPF (ref 0–4)

## 2018-05-29 PROCEDURE — 84702 CHORIONIC GONADOTROPIN TEST: CPT | Performed by: STUDENT IN AN ORGANIZED HEALTH CARE EDUCATION/TRAINING PROGRAM

## 2018-05-29 PROCEDURE — 85025 COMPLETE CBC W/AUTO DIFF WBC: CPT | Performed by: STUDENT IN AN ORGANIZED HEALTH CARE EDUCATION/TRAINING PROGRAM

## 2018-05-29 PROCEDURE — 99283 EMERGENCY DEPT VISIT LOW MDM: CPT

## 2018-05-29 PROCEDURE — 86900 BLOOD TYPING SEROLOGIC ABO: CPT | Performed by: STUDENT IN AN ORGANIZED HEALTH CARE EDUCATION/TRAINING PROGRAM

## 2018-05-29 PROCEDURE — 74011250636 HC RX REV CODE- 250/636: Performed by: STUDENT IN AN ORGANIZED HEALTH CARE EDUCATION/TRAINING PROGRAM

## 2018-05-29 PROCEDURE — 81025 URINE PREGNANCY TEST: CPT

## 2018-05-29 PROCEDURE — 76817 TRANSVAGINAL US OBSTETRIC: CPT

## 2018-05-29 PROCEDURE — 80053 COMPREHEN METABOLIC PANEL: CPT | Performed by: STUDENT IN AN ORGANIZED HEALTH CARE EDUCATION/TRAINING PROGRAM

## 2018-05-29 PROCEDURE — 36415 COLL VENOUS BLD VENIPUNCTURE: CPT | Performed by: STUDENT IN AN ORGANIZED HEALTH CARE EDUCATION/TRAINING PROGRAM

## 2018-05-29 PROCEDURE — 76801 OB US < 14 WKS SINGLE FETUS: CPT

## 2018-05-29 PROCEDURE — 81001 URINALYSIS AUTO W/SCOPE: CPT | Performed by: STUDENT IN AN ORGANIZED HEALTH CARE EDUCATION/TRAINING PROGRAM

## 2018-05-29 PROCEDURE — 96374 THER/PROPH/DIAG INJ IV PUSH: CPT

## 2018-05-29 RX ORDER — DOXYLAMINE SUCCINATE AND PYRIDOXINE HYDROCHLORIDE, DELAYED RELEASE TABLETS 10 MG/10 MG 10; 10 MG/1; MG/1
1 TABLET, DELAYED RELEASE ORAL
Qty: 30 TAB | Refills: 0 | Status: SHIPPED | OUTPATIENT
Start: 2018-05-29 | End: 2019-01-29

## 2018-05-29 RX ORDER — ONDANSETRON 2 MG/ML
4 INJECTION INTRAMUSCULAR; INTRAVENOUS
Status: COMPLETED | OUTPATIENT
Start: 2018-05-29 | End: 2018-05-29

## 2018-05-29 RX ADMIN — ONDANSETRON 4 MG: 2 INJECTION INTRAMUSCULAR; INTRAVENOUS at 10:29

## 2018-05-29 NOTE — ED PROVIDER NOTES
HPI Comments: 24 y.o. A0 female with no significant past medical history who presents from home via private vehicle with chief complaint of vomiting. Pt reports 1 week of fatigue, vomiting, and intermittent LLQ abdominal pain. Pt reports urinary frequency and started yesterday with vaginal spotting. Pt reports her LMP was 18. Pt denies any fever or diarrhea. There are no other acute medical concerns at this time. Social hx: Nonsmoker; No EtOH use  PCP: Mesfin Sanabria MD    Note written by Brett Matthews, as dictated by Yovani Nguyen MD 9:13 AM    The history is provided by the patient. A  was used ([employer]). Past Medical History:   Diagnosis Date    Headache        Past Surgical History:   Procedure Laterality Date    HX GYN               History reviewed. No pertinent family history. Social History     Social History    Marital status:      Spouse name: N/A    Number of children: N/A    Years of education: N/A     Occupational History    Not on file. Social History Main Topics    Smoking status: Never Smoker    Smokeless tobacco: Never Used    Alcohol use No    Drug use: No    Sexual activity: Not on file     Other Topics Concern    Not on file     Social History Narrative         ALLERGIES: Review of patient's allergies indicates no known allergies. Review of Systems   Constitutional: Positive for fatigue. Negative for chills and fever. HENT: Negative for sore throat. Respiratory: Negative for cough and shortness of breath. Cardiovascular: Negative for chest pain. Gastrointestinal: Positive for abdominal pain, nausea and vomiting. Negative for diarrhea. Genitourinary: Positive for frequency and vaginal bleeding. Negative for dysuria. Musculoskeletal: Negative for back pain. Skin: Negative for rash. Neurological: Negative for syncope and headaches. Psychiatric/Behavioral: Negative for confusion.    All other systems reviewed and are negative. Vitals:    05/29/18 0846   BP: 123/76   Pulse: 97   Resp: 20   Temp: 98.8 °F (37.1 °C)   SpO2: 98%   Weight: 65.5 kg (144 lb 6 oz)   Height: 5' 1\" (1.549 m)            Physical Exam   Constitutional: She is oriented to person, place, and time. She appears well-developed. No distress. HENT:   Head: Normocephalic and atraumatic. Eyes: Conjunctivae and EOM are normal. Pupils are equal, round, and reactive to light. Neck: Normal range of motion. Neck supple. Cardiovascular: Normal rate, regular rhythm and normal heart sounds. No murmur heard. Pulmonary/Chest: Effort normal and breath sounds normal. No respiratory distress. Abdominal: Soft. Bowel sounds are normal. She exhibits no distension. There is tenderness in the left lower quadrant. There is no rebound. Mild LLQ tenderness. Musculoskeletal: Normal range of motion. She exhibits no edema. Neurological: She is alert and oriented to person, place, and time. No cranial nerve deficit. She exhibits normal muscle tone. Coordination normal.   Skin: Skin is warm and dry. No rash noted. Psychiatric: She has a normal mood and affect. Her behavior is normal.   Nursing note and vitals reviewed.      Note written by Brett Wilder, as dictated by Effie Velarde MD 9:13 AM     [facility]      ED Course       Procedures

## 2018-05-29 NOTE — DISCHARGE INSTRUCTIONS
Threatened Miscarriage: Care Instructions  Your Care Instructions    Some women have light spotting or bleeding during the first 12 weeks of pregnancy. In some cases this is normal. Light spotting or bleeding can also be a sign of a possible loss of the pregnancy. This is called a threatened miscarriage. At this point, the doctor may not be able to tell if your vaginal bleeding is normal or is a sign of a miscarriage. In early pregnancy, things such as stress, exercise, and sex do not cause miscarriage. You may be worried or upset about the possibility of losing your pregnancy. But do not blame yourself. There is no treatment to stop a threatened miscarriage. If you do have a miscarriage, there was nothing you could have done to prevent it. A miscarriage usually means that the pregnancy is not developing normally. The doctor has checked you carefully, but problems can develop later. If you notice any problems or new symptoms, get medical treatment right away. Follow-up care is a key part of your treatment and safety. Be sure to make and go to all appointments, and call your doctor if you are having problems. It's also a good idea to know your test results and keep a list of the medicines you take. How can you care for yourself at home? · If you do have a miscarriage, you will probably have some vaginal bleeding for 1 to 2 weeks. Use pads instead of tampons. · Take acetaminophen (Tylenol) for cramps. Read and follow all instructions on the label. · Do not take two or more pain medicines at the same time unless the doctor told you to. Many pain medicines have acetaminophen, which is Tylenol. Too much acetaminophen (Tylenol) can be harmful. · Do not have sex until your doctor says it is okay. · Get lots of rest over the next several days. · You may do your normal activities if you feel well enough to do them. But do not do any heavy exercise until your doctor says it is okay.   · Eat a balanced diet that is high in iron and vitamin C. Foods rich in iron include red meat, shellfish, eggs, beans, and leafy green vegetables. Foods high in vitamin C include citrus fruits, tomatoes, and broccoli. Talk to your doctor about whether you need to take iron pills or a multivitamin. · Do not drink alcohol or use tobacco or illegal drugs. · Do not smoke. If you need help quitting, talk to your doctor about stop-smoking programs and medicines. These can increase your chances of quitting for good. When should you call for help? Call 911 anytime you think you may need emergency care. For example, call if:  ? · You passed out (lost consciousness). ?Call your doctor now or seek immediate medical care if:  ? · You have severe vaginal bleeding. ? · You are dizzy or lightheaded, or you feel like you may faint. ? · You have new or worse pain in your belly or pelvis. ? · You have a fever. ? · You have vaginal discharge that smells bad. ? Watch closely for changes in your health, and be sure to contact your doctor if:  ? · You do not get better as expected. Where can you learn more? Go to http://demetrio-kenneth.info/. Enter V456 in the search box to learn more about \"Threatened Miscarriage: Care Instructions. \"  Current as of: March 16, 2017  Content Version: 11.4  © 1980-1508 Healthwise, Incorporated. Care instructions adapted under license by [employer] (which disclaims liability or warranty for this information). If you have questions about a medical condition or this instruction, always ask your healthcare professional. [place] [address] any warranty or liability for your use of this information.

## 2018-05-29 NOTE — ED TRIAGE NOTES
Triage Note: Patient is coming in with vomiting x 1 week. Denies pain at this time. Denies diarrhea.  LMP 4/26/18

## 2018-06-02 ENCOUNTER — HOSPITAL ENCOUNTER (EMERGENCY)
Age: 28
Discharge: HOME | End: 2018-06-03
Attending: STUDENT IN AN ORGANIZED HEALTH CARE EDUCATION/TRAINING PROGRAM | Admitting: STUDENT IN AN ORGANIZED HEALTH CARE EDUCATION/TRAINING PROGRAM
Payer: MEDICAID

## 2018-06-02 DIAGNOSIS — R10.13 ABDOMINAL PAIN, EPIGASTRIC: Primary | ICD-10-CM

## 2018-06-02 DIAGNOSIS — R19.7 DIARRHEA, UNSPECIFIED TYPE: ICD-10-CM

## 2018-06-02 LAB
ALBUMIN SERPL-MCNC: 3.3 G/DL (ref 3.5–5)
ALBUMIN/GLOB SERPL: 0.7 {RATIO} (ref 1.1–2.2)
ALP SERPL-CCNC: 71 U/L (ref 45–117)
ALT SERPL-CCNC: 25 U/L (ref 12–78)
ANION GAP SERPL CALC-SCNC: 8 MMOL/L (ref 5–15)
AST SERPL-CCNC: 46 U/L (ref 15–37)
BASOPHILS # BLD: 0 K/UL (ref 0–0.1)
BASOPHILS NFR BLD: 0 % (ref 0–1)
BILIRUB SERPL-MCNC: 0.4 MG/DL (ref 0.2–1)
BUN SERPL-MCNC: 8 MG/DL (ref 6–20)
BUN/CREAT SERPL: 20 (ref 12–20)
CALCIUM SERPL-MCNC: 8.7 MG/DL (ref 8.5–10.1)
CHLORIDE SERPL-SCNC: 107 MMOL/L (ref 97–108)
CO2 SERPL-SCNC: 23 MMOL/L (ref 21–32)
CREAT SERPL-MCNC: 0.4 MG/DL (ref 0.55–1.02)
DIFFERENTIAL METHOD BLD: ABNORMAL
EOSINOPHIL # BLD: 0.1 K/UL (ref 0–0.4)
EOSINOPHIL NFR BLD: 1 % (ref 0–7)
ERYTHROCYTE [DISTWIDTH] IN BLOOD BY AUTOMATED COUNT: 13.1 % (ref 11.5–14.5)
GLOBULIN SER CALC-MCNC: 5 G/DL (ref 2–4)
GLUCOSE SERPL-MCNC: 79 MG/DL (ref 65–100)
HCG SERPL QL: POSITIVE
HCT VFR BLD AUTO: 29.6 % (ref 35–47)
HGB BLD-MCNC: 9.8 G/DL (ref 11.5–16)
IMM GRANULOCYTES # BLD: 0 K/UL (ref 0–0.04)
IMM GRANULOCYTES NFR BLD AUTO: 0 % (ref 0–0.5)
LYMPHOCYTES # BLD: 2.5 K/UL (ref 0.8–3.5)
LYMPHOCYTES NFR BLD: 44 % (ref 12–49)
MCH RBC QN AUTO: 28.2 PG (ref 26–34)
MCHC RBC AUTO-ENTMCNC: 33.1 G/DL (ref 30–36.5)
MCV RBC AUTO: 85.1 FL (ref 80–99)
MONOCYTES # BLD: 0.4 K/UL (ref 0–1)
MONOCYTES NFR BLD: 7 % (ref 5–13)
NEUTS SEG # BLD: 2.7 K/UL (ref 1.8–8)
NEUTS SEG NFR BLD: 47 % (ref 32–75)
NRBC # BLD: 0 K/UL (ref 0–0.01)
NRBC BLD-RTO: 0 PER 100 WBC
PLATELET # BLD AUTO: 188 K/UL (ref 150–400)
PMV BLD AUTO: 9.9 FL (ref 8.9–12.9)
POTASSIUM SERPL-SCNC: 4.6 MMOL/L (ref 3.5–5.1)
PROT SERPL-MCNC: 8.3 G/DL (ref 6.4–8.2)
RBC # BLD AUTO: 3.48 M/UL (ref 3.8–5.2)
SODIUM SERPL-SCNC: 138 MMOL/L (ref 136–145)
WBC # BLD AUTO: 5.6 K/UL (ref 3.6–11)

## 2018-06-02 PROCEDURE — 84703 CHORIONIC GONADOTROPIN ASSAY: CPT | Performed by: PHYSICIAN ASSISTANT

## 2018-06-02 PROCEDURE — 85025 COMPLETE CBC W/AUTO DIFF WBC: CPT | Performed by: PHYSICIAN ASSISTANT

## 2018-06-02 PROCEDURE — 83690 ASSAY OF LIPASE: CPT | Performed by: PHYSICIAN ASSISTANT

## 2018-06-02 PROCEDURE — 84702 CHORIONIC GONADOTROPIN TEST: CPT | Performed by: PHYSICIAN ASSISTANT

## 2018-06-02 PROCEDURE — 74011000250 HC RX REV CODE- 250: Performed by: PHYSICIAN ASSISTANT

## 2018-06-02 PROCEDURE — 99283 EMERGENCY DEPT VISIT LOW MDM: CPT

## 2018-06-02 PROCEDURE — 74011250636 HC RX REV CODE- 250/636: Performed by: PHYSICIAN ASSISTANT

## 2018-06-02 PROCEDURE — 96361 HYDRATE IV INFUSION ADD-ON: CPT

## 2018-06-02 PROCEDURE — 36415 COLL VENOUS BLD VENIPUNCTURE: CPT | Performed by: PHYSICIAN ASSISTANT

## 2018-06-02 PROCEDURE — 96374 THER/PROPH/DIAG INJ IV PUSH: CPT

## 2018-06-02 PROCEDURE — 80053 COMPREHEN METABOLIC PANEL: CPT | Performed by: EMERGENCY MEDICINE

## 2018-06-02 RX ORDER — FAMOTIDINE 10 MG/ML
20 INJECTION INTRAVENOUS
Status: DISCONTINUED | OUTPATIENT
Start: 2018-06-02 | End: 2018-06-02 | Stop reason: CLARIF

## 2018-06-02 RX ORDER — ACETAMINOPHEN 325 MG/1
650 TABLET ORAL
Status: DISCONTINUED | OUTPATIENT
Start: 2018-06-02 | End: 2018-06-02

## 2018-06-02 RX ORDER — SODIUM CHLORIDE 9 MG/ML
1000 INJECTION, SOLUTION INTRAVENOUS ONCE
Status: COMPLETED | OUTPATIENT
Start: 2018-06-02 | End: 2018-06-03

## 2018-06-02 RX ADMIN — SODIUM CHLORIDE 1000 ML/HR: 900 INJECTION, SOLUTION INTRAVENOUS at 23:00

## 2018-06-02 RX ADMIN — FAMOTIDINE 20 MG: 10 INJECTION, SOLUTION INTRAVENOUS at 23:00

## 2018-06-03 VITALS
HEIGHT: 61 IN | WEIGHT: 145.6 LBS | TEMPERATURE: 97.8 F | BODY MASS INDEX: 27.49 KG/M2 | OXYGEN SATURATION: 99 % | DIASTOLIC BLOOD PRESSURE: 68 MMHG | RESPIRATION RATE: 16 BRPM | HEART RATE: 77 BPM | SYSTOLIC BLOOD PRESSURE: 111 MMHG

## 2018-06-03 LAB
APPEARANCE UR: CLEAR
BACTERIA URNS QL MICRO: NEGATIVE /HPF
BILIRUB UR QL: NEGATIVE
COLOR UR: NORMAL
EPITH CASTS URNS QL MICRO: NORMAL /LPF
GLUCOSE UR STRIP.AUTO-MCNC: NEGATIVE MG/DL
HCG SERPL-ACNC: ABNORMAL MIU/ML (ref 0–6)
HCT VFR BLD AUTO: 30.1 % (ref 35–47)
HEMOCCULT STL QL: NEGATIVE
HGB BLD-MCNC: 10.1 G/DL (ref 11.5–16)
HGB UR QL STRIP: NEGATIVE
HYALINE CASTS URNS QL MICRO: NORMAL /LPF (ref 0–5)
KETONES UR QL STRIP.AUTO: NEGATIVE MG/DL
LEUKOCYTE ESTERASE UR QL STRIP.AUTO: NEGATIVE
LIPASE SERPL-CCNC: 96 U/L (ref 73–393)
NITRITE UR QL STRIP.AUTO: NEGATIVE
PH UR STRIP: 7 [PH] (ref 5–8)
PROT UR STRIP-MCNC: NEGATIVE MG/DL
RBC #/AREA URNS HPF: NORMAL /HPF (ref 0–5)
SP GR UR REFRACTOMETRY: 1.01 (ref 1–1.03)
UROBILINOGEN UR QL STRIP.AUTO: 1 EU/DL (ref 0.2–1)
WBC URNS QL MICRO: NORMAL /HPF (ref 0–4)

## 2018-06-03 PROCEDURE — 81003 URINALYSIS AUTO W/O SCOPE: CPT | Performed by: PHYSICIAN ASSISTANT

## 2018-06-03 PROCEDURE — 82272 OCCULT BLD FECES 1-3 TESTS: CPT | Performed by: PHYSICIAN ASSISTANT

## 2018-06-03 PROCEDURE — 85018 HEMOGLOBIN: CPT | Performed by: PHYSICIAN ASSISTANT

## 2018-06-03 PROCEDURE — 36415 COLL VENOUS BLD VENIPUNCTURE: CPT | Performed by: PHYSICIAN ASSISTANT

## 2018-06-03 RX ORDER — FAMOTIDINE 20 MG/1
20 TABLET, FILM COATED ORAL 2 TIMES DAILY
Qty: 20 TAB | Refills: 0 | Status: SHIPPED | OUTPATIENT
Start: 2018-06-03 | End: 2018-06-13

## 2018-06-03 NOTE — DISCHARGE INSTRUCTIONS
Abdominal Pain: Care Instructions ]  ÅÑÔÇÏÇÊ ÇáÑÚÇíÉ    åäÇß ÇáÚÏíÏ ãä ONVMZHF XUBYPEJI áÃáã ÇáÈØä. ÈÚÖåÇ áíÓ ÎØíÑðÇ æíãßä ÇáÔÝÇÁ ãäå ÊáÞÇÆíðÇ Ïæä ÊÏÎá Ýí ÛÖæä ÈÖÚÉ ÃíÇã. ÈíäãÇ ÊÍÊÇÌ ÈÚÖ ÇáÂáÇã Åáì ÅÌÑÇÁ ÇáãÒíÏ ãä IMTPHFTA ÇáØÈíÉ æÇáÊÏÇæí. ÝÅÐÇ ÇÓÊãÑ ÇáÃáã Ãæ ÇÒÏÇÏ ÓæÁðÇ¡ ÝÞÏ ÊÍÊÇÌ Åáì ÅÚÇÏÉ ÇáÝÍÕ æÑÈãÇ ÊÍÊÇÌ Åáì ÇáãÒíÏ ãä UYJCWMYB áÇßÊÔÇÝ ÇáãÔßáÉ. æÞÏ ÊÍÊÇÌ Åáì ÅÌÑÇÁ ÌÑÇÍÉ ZPRXAPB Êáß ÇáãÔßáÉ. áÇ ÊÊÛÇÖ Úä ÇáÃÚÑÇÖ ÇáÌÏíÏÉ ãËá ÇáÍãì æÇáÛËíÇä æÇáÞíÁ æãÔÇßá ÇáÈæá æÇáÃáã ÇáÐí íÒÏÇÏ ÓæÁðÇ ÈÇáÅÖÇÝÉ Åáì ÇáÏæÇÑ. ÝÞÏ Êßæä Êáß UANLOLO ÚáÇãÇÊ JQYKQU ÃßËÑ ÎØæÑÉ. ÞÏ íæÕí ØÈíÈß ÈÅÌÑÇÁ ÒíÇÑÉ ãÊÇÈÚÉ Ýí ÛÖæä 8 Åáì 12 ÓÇÚÉ ÇáÊÇáíÉ. ÅÐÇ áã ÊÊÍÓä ÍÇáÊß¡ ÝÞÏ ÊÍÊÇÌ Åáì ÇáãÒíÏ ãä OPKQHAQC TUOILJV. áÞÏ ÝÍÕß ÇáØÈíÈ ÝÍÕðÇ ÏÞíÞðÇ¡ æáßä ÞÏ ÊÊØæÑ ÇáÃÚÑÇÖ áÇÍÞðÇ. ÝÅÐÇ ãÇ áÇÍÙÊ ÃíÉ BBDXSF Ãæ ÃÚÑÇÖ ÌÏíÏÉ¡ ÝÇØáÈ OFBNPE ÇáØÈí ÝæÑðÇ. Åä ãÊÇÈÚÉ ÇáÑÚÇíÉ ÌÒÁ ÑÆíÓí Ýí ÚáÇÌß æÓáÇãÊß. ÝÇÍÑÕ Úáì ÅÌÑÇÁ ÊÑÊíÈÇÊ ÌãíÚ ãæÇÚíÏ ÒíÇÑÉ ÇáØÈíÈ æÇáÊÒã ÈÊáß ÇáãæÇÚíÏ æÇÊÕá ÈØÈíÈß ÅÐÇ ßÇäÊ áÏíß ãÔßáÇÊ. æãä ÇáÃÝßÇÑ ÇáÌíÏÉ ÃíÖðÇ ãÚÑÝÉ äÊÇÆÌ VYCRKGHT LRAKYUITO ÈÞÇÆãÉ MYWGYYQH ÇáÊí YOGCZMAQ. ßíÝ ÊÚÊäí ÈäÝÓß Ýí OZBDYE¿   · ÎõÐ ÞÓØðÇ ãä QGPKTW ÍÊì ÊÔÚÑ ÈÇáÊÍÓä.  · ááæÞÇíÉ ãä OLIZKO¡ ÇÔÑÈ ßãíÉ æÝíÑÉ ãä MPFKXQK ÈãÇ íßÝí áÃä íÕÈÍ áæä Èæáß ÃÕÝÑ JUONBB Ãæ ÕÇÝíðÇ ãËá ÇáãÇÁ. æÇÔÑÈ ÇáãÇÁ æÇáÓæÇÆá ÇáÕÇÝíÉ ÇáÃÎÑì ÇáÎÇáíÉ ãä ÇáßÇÝííä ÍÊì ÊÔÚÑ ÈÇáÊÍÓä. ÅÐÇ ßäÊ ãÕÇÈðÇ ÈÃãÑÇÖ Çáßõáì Ãæ ÇáÞáÈ Ãæ ÇáßÈÏ æÚáíß Ãä ÊáÊÒã ÈÊÞáíá SEGKCLJ¡ ÝÊÍÏË ãÚ ØÈíÈß ÞÈá Ãä ÊÞæã ÈÒíÇÏÉ ßãíÉ PUUXSJG ÇáÊí ÊÔÑÈåÇ.  · ÅÐÇ ßäÊ ÊÚÇäí ãä ÇÖØÑÇÈ Ýí ÇáãÚÏÉ¡ XGCGKO ÇáÃØÚãÉ ÇáÎÝíÝÉ¡ ãËá ÇáÃÑÒ Ãæ ÇáÎÈÒ JVAYNY ÇáÌÇÝ Ãæ ÇáÈÓßæíÊ æÇáãæÒ æÕáÕÉ ÇáÊÝÇÍ. ÍÇæá ÊäÇæá æÌÈÇÊ ÕÛíÑÉ æãÊÚÏÏÉ ÈÏáÇð ãä æÌÈÊíä Ãæ ËáÇË æÌÈÇÊ ßÈíÑÉ.  · ÇäÊÙÑ ÇäÞÖÇÁ 48 ÓÇÚÉ ÈÚÏ ÒæÇá ÌãíÚ ÇáÃÚÑÇÖ ÞÈá ÊäÇæá ÇáÃØÚãÉ ÇáÛäíÉ EWWJZDWE æÇáßÍæá æÇáãÔÑæÈÇÊ ÇáÊí ÊÍÊæí Úáì ÇáßÇÝííä.  · áÇ ÊÊäÇæá ÃØÚãÉ ÊÍÊæí Úáì äÓÈÉ ÚÇáíÉ ãä ÇáÏåæä.    · ÊÌäÈ ÇáÃÏæíÉ ÇáãÖÇÏÉ ááÇáÊåÇÈÇÊ ãËá ÇáÃÓÈíÑíä æÇáÅíÈæÈÑæÝíä (ÃÏÝíá¡ ãæÊÑíä)¡ æäÇÈÑæßÓíä (ÃáíÝ). ÝåÐå ÇáÃÏæíÉ ÞÏ ÊÓÈÈ ÇÖØÑÇÈðÇ Ýí ÇáãÚÏÉ. ÇÓÊÔÑ ØÈíÈß ÅÐÇ ßäÊ ÊÊäÇæá ÇáÃÓÈíÑíä íæãíðÇ áÚáÇÌ ãÔßáÉ ÕÍíÉ ÃÎÑì. ãÊì íÌÈ TKIREDL áØáÈ ÇáãÓÇÚÏÉ¿  ÇÊÕá ÈÇáÑÞã 911 Ýí Ãí æÞÊ ÊÑì Ýíå ÍÇÌÊß Åáì ÑÚÇíÉ ØÇÑÆÉ. ÝãËáÇð¡ ÇÊÕá Ýí ÍÇáÉ:   · ÝÞÏÇä ÇáæÚí.  · LEWPJIVB ÇáÏÇßä ááÈÑÇÒ Ãæ ÇÍÊæÇÆå Úáì ÏãÇÁ ßËíÑÉ.  · ÇáÊÞíÄ ÏãðÇ Ãæ ãÇ íÔÈå ÑÇÓÈ ÇáÞåæÉ.  · ÅÕÇÈÊß ÈÃáã ÌÏíÏ æÍÇÏ Ýí ÇáÈØä. ÇÊÕá ÈØÈíÈß ÇáÂä Ãæ ÇÈÍË Úä ÇáÑÚÇíÉ ÇáØÈíÉ ÇáÝæÑíÉ Ýí KMWNWRO ÇáÊÇáíÉ:   · ÒíÇÏÉ ÔÏÉ UIERE¡ æÎÕæÕðÇ ÅÐÇ ÃÕÈÍ ãÊãÑßÒðÇ Ýí ãäØÞÉ æÇÍÏÉ ãä ÇáÈØä.  · ÅÕÇÈÊß ÈÍãì ÌÏíÏÉ Ãæ ÇÑÊÝÇÚ ÍÑÇÑÉ Íãì ßÇäÊ áÏíß.  · ÓæÇÏ ÇáÈÑÇÒ æÊÔÇÈåå Ýí Çááæä ãÚ ÇáÞØÑÇä Ãæ ÇÍÊæÇÄå Úáì ÎØæØ ÏãæíÉ.  · Ýí ÍÇáÉ ÇáãÑÃÉ¡ ÇáÅÕÇÈÉ ÈäÒíÝ ãåÈáí ÛíÑ ãÊæÞÚ.  · ÙåæÑ ÃÚÑÇÖ ÚÏæì ÇáÌåÇÒ ÇáÈæáí. æÊÔãá åÐå ÇáÂËÇÑ ÇáÌÇäÈíÉ ãÇ íáí:   o o ÇáÔÚæÑ ÈÃáã ÚäÏ ÇáÊÈæá. o o INYIDU ÃßËÑ ãä ÇáãÚÊÇÏ. o o æÌæÏ Ïã Ýí ÇáÈæá.  · ÇáÏæÇÑ Ãæ ÇáÏæÎÉ Ãæ ÇáÔÚæÑ ÈÅÛãÇÁ ãÍÊãá. ÊÊÚíä ÇáãÑÇÞÈÉ Úä ßËÈ áÃíÉ ÊÛííÑÇÊ ÊØÑÃ Úáì ÇáÕÍÉ æÇáÍÑÕ Úáì SXBHPLU ÈÇáØÈíÈ Ýí XASVYYG ÇáÊÇáíÉ:   · ÚÏã ÇáÔÚæÑ ÈÊÍÓøõä ÈÚÏ ãÑæÑ íæã (24 ÓÇÚÉ). Ãíä íãßä ãÚÑÝÉ ÇáãÒíÏ¿  ÇäÊÞÇá Åáì [email or web address]/. ÏÎæá [address] íãßäß ãÚÑÝÉ ÇáãÒíÏ ãä ÎáÇá ãÑÈÚ ÇáÈÍË \"ÂáÇã ÇáÈØä: ÅÑÔÇÏÇÊ ÇáÑÚÇíÉ - [ Abdominal Pain: Care Instructions ]. \"  © 7437-4021 Healthwise, [employer]. ÊãÊ ÊåíÆÉ ÅÑÔÇÏÇÊ ÇáÚäÇíÉ ÈãæÌÈ ÊÑÎíÕ ãä ãÎÊÕ ÇáÑÚÇíÉ ÇáÕÍíÉ áÏíß. ÅÐÇ ßÇäÊ áÏíß ÃíÉ HSAZJZCAU Úä ÍÇáÉ ØÈíÉ Ãæ Ãí ãä åÐå KCQKCYBKC¡ ÝÊæÌå ÏæãðÇ YHHWEJU Åáì ãÎÊÕ ÇáÑÚÇíÉ ÇáÕÍíÉ. ÊäÝí ãäÙãÉ [employer] Rylie Spencerä Ãæ Mireille Wilkins HFNHKLR åÐå QWRQBASNP.   ÅÕÏÇÑ ÇáãÍÊæì: 11.4 ãÍÏøË YZNLEAYQ ãä: 21 WJCBO KBCVAPG 3080

## 2018-06-03 NOTE — ED PROVIDER NOTES
HPI Comments: 24 y.o. female with past medical history significant for headaches,  who presents from home with chief complaint of abdominal pain. Pt reports 4 day hx of abdominal pain. The pain is most significant to epigastric region and is intermittent. She reports being evaluated in the ED at that time and was prescribed a medication (Diclegis) but could not afford it. Over the past 3 days, the pain has remained unchanged but she developed nausea, vomiting and diarrhea. Pt states that she is having 4-5 episodes of watery diarrhea per day. She feels primarily nauseous and has only vomited a couple of times. Pt denies known sick contact. She denies hx of similar symptoms. No recent travel. She has not taken any medications. No headache, dizziness, chest pain, urinary symptoms, fever or chills. No blood in stool or black stools. There are no other acute medical concerns at this time. Chart review: Pt evaluated in ED 18 for abdominal pain and vomiting. Pregnancy test confirmed. Ultrasound noted -probable early single intrauterine pregnancy, too early to date. Questionable subchorionic hemorrhage vs artifact. Pt prescribed Diclegis. PCP: Juan David Berumen MD  No current OB/GYN. Note written by Brett Fallon, as dictated by MIRANDA Munoz 10:57 PM    The history is provided by the patient. A  was used. Past Medical History:   Diagnosis Date    Headache        Past Surgical History:   Procedure Laterality Date    HX GYN               No family history on file. Social History     Social History    Marital status:      Spouse name: N/A    Number of children: N/A    Years of education: N/A     Occupational History    Not on file.      Social History Main Topics    Smoking status: Never Smoker    Smokeless tobacco: Never Used    Alcohol use No    Drug use: No    Sexual activity: Not on file     Other Topics Concern    Not on file     Social History Narrative         ALLERGIES: Review of patient's allergies indicates no known allergies. Review of Systems   Constitutional: Negative for chills and fever. Cardiovascular: Negative for chest pain. Gastrointestinal: Positive for abdominal pain, diarrhea, nausea and vomiting. Negative for blood in stool and constipation. Genitourinary: Negative for difficulty urinating, dysuria and hematuria. Neurological: Negative for dizziness and headaches. All other systems reviewed and are negative. Vitals:    06/02/18 2200 06/02/18 2203   BP: 115/75    Pulse: 84    Resp: 16    Temp: 98.2 °F (36.8 °C)    SpO2: 97%    Weight:  66 kg (145 lb 9.6 oz)   Height: 5' 1\" (1.549 m)             Physical Exam   Constitutional: She is oriented to person, place, and time. She appears well-developed and well-nourished. No distress. Pleasant adult female in NAD   HENT:   Head: Normocephalic and atraumatic. Right Ear: External ear normal.   Left Ear: External ear normal.   Nose: Nose normal.   Mouth/Throat: Oropharynx is clear and moist. No oropharyngeal exudate. Eyes: Conjunctivae and EOM are normal. Pupils are equal, round, and reactive to light. Right eye exhibits no discharge. Left eye exhibits no discharge. No scleral icterus. Neck: Normal range of motion. Neck supple. Cardiovascular: Normal rate and regular rhythm. Exam reveals no gallop and no friction rub. No murmur heard. Pulmonary/Chest: Effort normal and breath sounds normal. She has no wheezes. She has no rales. Abdominal: Soft. Bowel sounds are normal. She exhibits no distension. There is tenderness (epigastric). There is no rebound and no guarding. Neurological: She is alert and oriented to person, place, and time. No cranial nerve deficit. Coordination normal.   Skin: Skin is warm and dry. She is not diaphoretic. Psychiatric: She has a normal mood and affect.  Her behavior is normal.   Nursing note and vitals reviewed. MDM  Number of Diagnoses or Management Options  Abdominal pain, epigastric:   Diarrhea, unspecified type:   Diagnosis management comments: 25 yo female with complaint of epigastric pain and diarrhea. Stable vitals with soft abdomen with TTP in epigastric area. Appears well hydrated. ? Gastritis vs gastroenteritis vs obstipation amongst other. No pregnancy related complaints. Plan  CBC  CMP  Lipase  UA  pepcid  Reassess. Sarahy DicksonFerrell [place]         Amount and/or Complexity of Data Reviewed  Clinical lab tests: ordered and reviewed          ED Course       Procedures   Progress note    No stool while in ED. H and H dropped from prior ED visit. FOBT -. Will start on H blocker. Sarahy Vazquez [place]    Patient's results have been reviewed with them. Patient and/or family have verbally conveyed their understanding and agreement of the patient's signs, symptoms, diagnosis, treatment and prognosis and additionally agree to follow up as recommended or return to the Emergency Room should their condition change prior to follow-up. Discharge instructions have also been provided to the patient with some educational information regarding their diagnosis as well a list of reasons why they would want to return to the ER prior to their follow-up appointment should their condition change.  Sarahy Garay [place]

## 2018-06-03 NOTE — ED TRIAGE NOTES
Triage note: Pt arrives ambulatory with c/o diarrhea and abdominal pain x 3 days. Pt seen here on 5/29 and told she is pregnant. Pt given prescription for N/V but could not get it filled because she cannot afford. Denies vaginal bleeding.

## 2018-07-11 LAB
HBSAG, EXTERNAL: NEGATIVE
HIV, EXTERNAL: NORMAL
T. PALLIDUM, EXTERNAL: NEGATIVE

## 2018-11-05 LAB
ANTIBODY SCREEN, EXTERNAL: NEGATIVE
CHLAMYDIA, EXTERNAL: NEGATIVE
N. GONORRHEA, EXTERNAL: NEGATIVE
RUBELLA, EXTERNAL: NORMAL
TYPE, ABO & RH, EXTERNAL: NORMAL

## 2019-01-03 LAB — GRBS, EXTERNAL: NEGATIVE

## 2019-01-25 ENCOUNTER — HOSPITAL ENCOUNTER (EMERGENCY)
Age: 29
Discharge: HOME | DRG: 560 | End: 2019-01-25
Attending: OBSTETRICS & GYNECOLOGY | Admitting: OBSTETRICS & GYNECOLOGY
Payer: MEDICAID

## 2019-01-25 VITALS
TEMPERATURE: 98 F | HEART RATE: 80 BPM | DIASTOLIC BLOOD PRESSURE: 70 MMHG | SYSTOLIC BLOOD PRESSURE: 113 MMHG | RESPIRATION RATE: 18 BRPM

## 2019-01-25 PROBLEM — O42.90 AMNIOTIC FLUID LEAKING: Status: ACTIVE | Noted: 2019-01-25

## 2019-01-25 LAB
A1 MICROGLOB PLACENTAL VAG QL: NEGATIVE
CONTROL LINE PRESENT?: NORMAL
EXPIRATION DATE: NORMAL
INTERNAL NEGATIVE CONTROL: NORMAL
KIT LOT NO.: NORMAL

## 2019-01-25 PROCEDURE — 76060000078 HC EPIDURAL ANESTHESIA

## 2019-01-25 PROCEDURE — 75410000003 HC RECOV DEL/VAG/CSECN EA 0.5 HR

## 2019-01-25 PROCEDURE — 75410000002 HC LABOR FEE PER 1 HR

## 2019-01-25 PROCEDURE — 84112 EVAL AMNIOTIC FLUID PROTEIN: CPT | Performed by: OBSTETRICS & GYNECOLOGY

## 2019-01-25 PROCEDURE — 99285 EMERGENCY DEPT VISIT HI MDM: CPT

## 2019-01-25 PROCEDURE — 75410000000 HC DELIVERY VAGINAL/SINGLE

## 2019-01-25 NOTE — PROGRESS NOTES
[phone] arrived to L&D c/o feeling a gush of fluid @ 1330 today denies having pain or any further leakage of fluid. Pt speaks little [place],  speaks more [place], used language line. 26  Dr. Stella Mcmahan at bedside to examine pt, speculum exam, nitrazine, and fern negative. 1655  Amnisure negative. 1700 Portable ultrasound done by Dr. Daphne Lo and adequate volume of fluid noted. [address]  Dr. Stella Mcmahan at bedside and plan for discharge discussed, labor precautions given. Pt has appt with Dr. Gilberto Kay Monday. 5  Pt and family left unit ambulatory.

## 2019-01-25 NOTE — PROGRESS NOTES
PN    35 y/o  at 41w 5d with c/o leaking. No evidence of rom appreciated with neg amnisure, nit, pool and fern.   Reassuring fetal surveillance  Bedside ultrasound at bedside with Dr. Kinza Goel appears to have adequate fluid   Has appt next Monday    Reviewed labor, rom and [facility] bid

## 2019-01-25 NOTE — DISCHARGE INSTRUCTIONS
Patient Education        Kathryn Hernandes Contractions: Care Instructions  Your Care Instructions    Antonio Rucker contractions prepare your uterus for labor. Think of them as a \"warm-up\" exercise that your body does. You may begin to feel them between the 28th and 30th weeks of your pregnancy. But they start as early as the 20th week. Antonio Rucker contractions usually occur more often during the ninth month. They may go away when you are active and return when you rest. These contractions are like mild contractions of true labor, but they occur less often. (You feel fewer than 8 in an hour.) They don't cause your cervix to open. It may be hard for you to tell the difference between Kathryn Hernandes contractions and true labor, especially in your first pregnancy. Follow-up care is a key part of your treatment and safety. Be sure to make and go to all appointments, and call your doctor if you are having problems. It's also a good idea to know your test results and keep a list of the medicines you take. How can you care for yourself at home? · Try a warm bath to help relieve muscle tension and reduce pain. · Change positions every 30 minutes. Take breaks if you must sit for a long time. Get up and walk around. · Drink plenty of water, enough so that your urine is light yellow or clear like water. · Taking short walks may help you feel better. Your doctor needs to check any contractions that are getting stronger or closer together. Where can you learn more? Go to http://demetrio-kenneth.info/. Enter 352 [id number] in the search box to learn more about \"Clovis Rucker Contractions: Care Instructions. \"  Current as of: September 5, 2018  Content Version: 11.9  © 0735-4293 [employer]. Care instructions adapted under license by [employer] (which disclaims liability or warranty for this information).  If you have questions about a medical condition or this instruction, always ask your healthcare professional. [place] [address] any warranty or liability for your use of this information. Follow up with Dr. Dee Dee Roberts Monday  Patient Education        Learning About Premature Rupture of Membranes (PROM)  What is premature rupture of membranes? Before a baby is born, the amniotic sac breaks open. This causes amniotic fluid to either leak slowly or gush out. It's often called \"having your water break. \" When this happens before contractions start, it is called premature rupture of membranes (PROM). PROM can occur at any time during pregnancy before labor begins. Early PROM can happen before 37 full weeks of pregnancy. Then it's called  premature rupture of membranes, or pPROM. Smoking while pregnant increases the risk of PROM. What happens when you have PROM? · Labor usually starts soon after PROM. If it doesn't, your doctor may induce labor (use medicine to start it). · The amniotic sac protects the baby from infection. After the sac is torn, the risk for infection is much higher. If you think your sac has broken open, avoid letting anything enter your vagina. Don't have sex or flush your vagina with fluid (douche). · After the sac ruptures, the baby moves down into the pelvis. The baby may press on the umbilical cord. This is not common, but it can cut off the baby's oxygen and blood supplies. In that case the baby must be delivered quickly. What are the symptoms? · When your water breaks, it often feels like a large gush of water. Or it may feel like you're leaking a small amount of water. · Water from the amniotic sac is normally a cloudy-white to an josh-straw color. If you notice that your water is dark or greenish, foul-smelling, or bloody, tell your doctor. How is PROM treated? · Your doctor will probably have you go to the hospital.  · If labor doesn't start in 12 to 24 hours, your doctor may want to induce.   · If your doctor is worried about infection, you may be given antibiotics. Follow-up care is a key part of your treatment and safety. Be sure to make and go to all appointments, and call your doctor if you are having problems. It's also a good idea to know your test results and keep a list of the medicines you take. Where can you learn more? Go to http://demetrio-kenneth.info/. Enter S771 in the search box to learn more about \"Learning About Premature Rupture of Membranes (PROM). \"  Current as of: September 5, 2018  Content Version: 11.9  © 8569-4178 [employer]. Care instructions adapted under license by [employer] (which disclaims liability or warranty for this information). If you have questions about a medical condition or this instruction, always ask your healthcare professional. [place] [address] any warranty or liability for your use of this information. Patient Education       ÇáÃÓÈæÚ 39 ãä WYGMW: Javier Pelaez  [ Week 44 of Your Pregnancy: Care Instructions ]  ÅÑÔÇÏÇÊ ÇáÑÚÇíÉ ÇáÎÇÕÉ Èß      CYYY åÐå ÇáÃÓÇÈíÚ ÇáäåÇÆíÉ¡ ÞÏ ÊÔÚÑíä SZJCURK áÑÄíÉ ØÝáß ÇáÌÏíÏÉ. ÚÇÏÉð íÈÏæ ÇáãæÇáíÏ ÇáÌÏÏ ãÎÊáÝíä ÚãÇ ÊÔÇåÏíäå Ýí ÇáÕæÑ Ãæ ÇáÃÝáÇã. ÈÚÏ ÇáæáÇÏÉ ãÈÇÔÑÉð¡ íãßä Ãä íßæä Ôßá ÑÄæÓåã ÛÑíÈðÇ. íãßä Ãä Êßæä ÃÚíäåã ãäÊÝÎÉ. æíãßä Ãä Êßæä GQMEYYB KHUYZPJEA ãÊæÑãÉ. ßãÇ íãßä Ãä Êßæä ÈÔÑÊåã ÔÏíÏÉ ÇáÌÝÇÝ¡ Ãæ ÚáÇãÇÊ ÍãÑÇÁ Úáì ÇáÌÝæä¡ Ãæ ÇáÃäÝ¡ Ãæ ÇáÑÞÈÉ. æãÚ Ðáß áÇ íÒÇá ÃÛáÈ ÇáÂÈÇÁ íÚÊÞÏæä Ãä GBSABVO Ðæí Ôßá Ìãíá. ÊõÚÏ ÑÚÇíÉ ÇáãÊÇÈÚÉ ÌÒÁðÇ ÃÓÇÓíðÇ Ýí ÚáÇÌß æÓáÇãÊß. ÝÚáíß ÇáÍÑÕ Úáì ÊÑÊíÈ ÌãíÚ ãæÇÚíÏ ÒíÇÑÉ ÇáØÈíÈ HNNKQTEPP ÈåÇ¡ CWFIUZSU ÈØÈíÈß ÚäÏ VRWVPMWB ãä Ãí ãÔßáÇÊ. æãä ÇáÌíÏ ÃíÖðÇ Ãä ÊÚÑÝ äÊÇÆÌ MKCCYGOX æßÐáß CYTUCHGV ÈÞÇÆãÉ ÇáÃÏæíÉ ÇáÊí SDNLBSMB. ßíÝ íãßäß ÇáÇÚÊäÇÁ ÈäÝÓß Ýí TGDMWK¿  FZTAPKUGD ááÅÝØÇÑ   · ÅÐÇ ßäÊö ÊÞæãíä ÈÇáÑÖÇÚÉ ÇáØÈíÚíÉ¡ ÝÇÓÊãÑí Ýí ÊäÇæá ÃØÚãÉ ÕÍíÉ.  · ÊÌäÈí MKQQYJWHD XOYKEGUL¡ æÇáÓÌÇÆÑ¡ æÇáãÎÏÑÇÊ. íÊÖãä Ðáß ÇáÃÏæíÉ ÇáãÞÑÑÉ ÈæÕÝÉ ØÈíÉ Ãæ ãÊÇÍÉ ÈÏæä æÕÝÉ ØÈíÉ.    · íãßäß ÇáãÓÇÚÏÉ Ýí ÇáæÞÇíÉ ãä ÇáÊåÇÈ ÇáÍáãÇÊ ÅÐÇ ßäÊö ÊÑÖÚíä ØÝáß Ýí ÇáæÖÚ ÇáÕÍíÍ. ÓÊÓÇÚÏß ÇáããÑÖÉ Ýí ÊÚáã ÇáÞíÇã ÈÐáß.  · íÍÊÇÌ ÇáãæáæÏ ÇáÌÏíÏ Åáì ÇáÑÖÇÚÉ ßá ÓÇÚÉ æäÕÝ ÇáÓÇÚÉ Åáì 3 ÓÇÚÇÊ ÊÞÑíÈðÇ. ÇÎÊÇÑí æÓíáÉ ÊäÙíã ÇáÍãá ÇáãäÇÓÈÉ ÈÚÏ æáÇÏÉ ØÝáß   · áÇ íÒÇá ãä Çáããßä Íãá ÇáÓíÏÇÊ ÇáÇÊí ÊÑÖÚä ÑÖÇÚÉ ØÈíÚíÉ. ÇÓÊÎÏãí æÓÇÆá ÊäÙíã JPSWH ÅÐÇ ßäÊö áÇ ÊÑÛÈíä Ýí ÇáÍãá.  · íõÚÊÈÑ BIBHZZ ÇáÑÍãí (IUDs) ÝÚøÇá ááÓíÏÇÊ ÇááÇÊí ÊÑÛÈä Ýí ÇáÇäÊÙÇÑ áãÏÉ áÇ ÊÞá Úä ÓäÊíä ÞÈá ÇáÍãá ãÑÉ ÃÎÑì. ßãÇ Ãä GMICJKTH Âãä ÃËäÇÁ ÇáÑÖÇÚÉ ÇáØÈíÚíÉ.  · íãßä ÇÓÊÎÏÇã \"ÏíÈæ ÈÑæÝíÑÇ\" ÃËäÇÁ ÇáÑÖÇÚÉ ÇáØÈíÚíÉ. ÅäåÇ ÍÞäÉ ÊõÃÎÐ ßá 3 ÃÔåÑ.  · ßãÇ Ãä ÃÞÑÇÕ ÊäÙíã ÇáÍãá ÝÚÇáÉ. áßäß ÊÍÊÇÌíä Åáì äæÚ ãÎÊáÝ ãä ÇáÃÞÑÇÕ ÃËäÇÁ ÇáÑÖÇÚÉ ÇáØÈíÚíÉ. æÚäÏãÇ ÊÈÏÃíä Ýí ÊäÇæá åÐå ÇáÃÞÑÇÕ¡ íÊÚíä Úáíßö ÇáÊÃßÏ ãä ÇÓÊÎÏÇã äæÚðÇ ÂÎÑ ãä æÓÇÆá ÊäÙíã ÇáÍãá ÍÊì ÊÈÏÃíä ÇáãÌãæÚÉ ÇáËÇäíÉ.  · íÚÊÈÑ MLLEWT ÇáãÇäÚ QDTQW¡ æÛØÇÁ ÚäÞ CWRBR¡ æÚãáíÇÊ ÒÑÚ ÞäÇÉ ÝÇáæÈ¡ NTPYXIAQR ÐÇÊ ãÈíÏ ÇáäØÇÝ ãä YQVDDKJ MDLNLEU ÈÚÏ ÇáæáÇÏÉ. ÅÐÇ ßÇä áÏíßö ÇáÍÌÇÈ ÇáãÇäÚ NIBMY Ãæ ÛØÇÁ ÚäÞ ÇáÑÍã¡ ÝÓÊÍÊÇÌíä Åáì ÊÌÏíÏå.  · ÑÈØ ÞäÇÉ ÝÇáæÈ (ÑÈØ ÇáÞäæÇÊ áÏíßö) æÞØÚ ÇáÞäÇÉ ÇáãäæíÉ ßáÇåãÇ Íáæá ÏÇÆãÉ. ÅäåÇ ÎíÇÑÇÊ ÌíÏÉ ÅÐÇ ßäÊö æÇËÞÉ ãä Ãäß ÇßÊÝíÊ ãä ÇáÅäÌÇÈ. Ãíä íãßä ãÚÑÝÉ ÇáãÒíÏ¿  ÇäÊÞÇá Åáì [email or web address] ÏÎæá A811 íãßäß ãÚÑÝÉ ÇáãÒíÏ ãä ÎáÇá ãÑÈÚ ÇáÈÍË \"ÇáÃÓÈæÚ 44 ãä ÇáÍãá: ÅÑÔÇÏÇÊ ÇáÑÚÇíÉ - [ Week 44 of Your Pregnancy: Care Instructions ]. \"  © 2029-5500 Healthwise, [employer]. ÊãÊ ÊåíÆÉ ÅÑÔÇÏÇÊ ÇáÚäÇíÉ ÈãæÌÈ ÊÑÎíÕ ãä ãÎÊÕ ÇáÑÚÇíÉ ÇáÕÍíÉ áÏíß. ÅÐÇ ßÇäÊ áÏíß ÃíÉ ZCWNBOYFL Úä ÍÇáÉ ØÈíÉ Ãæ Ãí ãä åÐå UKJKJITZC¡ ÝÊæÌå ÏæãðÇ XFXBFXJ Åáì ãÎÊÕ ÇáÑÚÇíÉ ÇáÕÍíÉ. ÊäÝí ãäÙãÉ [employer] Silvio Irons ÖãÇä Ãæ [place] DYQRDUE åÐå JUJOEDIDW.   ÅÕÏÇÑ ÇáãÍÊæì: 11.9 ãÍÏøË ALFONSO ãä: 25 KT SGFZJ [id number]

## 2019-01-25 NOTE — H&P
History & Physical    Name: Tammy Denver MRN: [id number]  SSN: [id number]    YOB: 1990  Age: 34 y.o. Sex: female        Subjective:     Estimated Date of Delivery: None noted. OB History    Para Term  AB Living   1             SAB TAB Ectopic Molar Multiple Live Births                    # Outcome Date GA Lbr Isac/2nd Weight Sex Delivery Anes PTL Lv   1 Current                   Ms. Kiesha Hanna is admitted with pregnancy at Unknown for Leaking of fluid. . Prenatal course was normal. Please see prenatal records for details. Past Medical History:   Diagnosis Date    Headache      Past Surgical History:   Procedure Laterality Date    HX GYN           Social History     Occupational History    Not on file   Tobacco Use    Smoking status: Never Smoker    Smokeless tobacco: Never Used   Substance and Sexual Activity    Alcohol use: No    Drug use: No    Sexual activity: Not on file     No family history on file. No Known Allergies  Prior to Admission medications    Medication Sig Start Date End Date Taking? Authorizing Provider   PNV No12-Iron-FA-DSS-OM-3 29 mg iron-1 mg -50 mg CPKD Take  by mouth. Yes Provider, Historical   doxylamine-pyridoxine, vit B6, (DICLEGIS) 10-10 mg TbEC DR tablet Take 1 Tab by mouth nightly. 18   Girish Singh MD   butalbital-acetaminophen-caff (FIORICET) -40 mg per capsule Take 1 Cap by mouth every four (4) hours as needed for Pain. Max Daily Amount: 6 Caps. 17   Gustavo Garcia MD   ondansetron (ZOFRAN ODT) 4 mg disintegrating tablet Take 1 Tab by mouth every eight (8) hours as needed for Nausea. 17   Marcianne Schirmer, MD        Review of Systems: A comprehensive review of systems was negative except for that written in the HPI.     Objective:     Vitals:  Vitals:    19 1546   BP: 113/70   Pulse: 80   Resp: 18   Temp: 98 °F (36.7 °C)        Physical Exam:  Abdomen: soft, nontender  Membranes:  Intact  Fetal Heart Rate: Reactive    Prenatal Labs:   Lab Results   Component Value Date/Time    ABO/Rh(D) A POSITIVE 2018 09:27 AM         Assessment/Plan:     A/P  35 y/o  at 39wks with c/o gush of fluid.   H/o section and subsequent   Reassuring fetal surveillance  No evidence of rom at this time with neg pool/nit/fern      Plan:   Amnisure and bedside ultrasound for DAVID  Confirm reassuring fetal surveillance    Signed By:  Siena Whitaker MD     2019

## 2019-01-26 ENCOUNTER — HOSPITAL ENCOUNTER (INPATIENT)
Age: 29
LOS: 2 days | Discharge: HOME | DRG: 560 | End: 2019-01-29
Attending: OBSTETRICS & GYNECOLOGY | Admitting: OBSTETRICS & GYNECOLOGY
Payer: MEDICAID

## 2019-01-26 PROCEDURE — 4A1H74Z MONITORING OF PRODUCTS OF CONCEPTION, CARDIAC ELECTRICAL ACTIVITY, VIA NATURAL OR ARTIFICIAL OPENING: ICD-10-PCS | Performed by: OBSTETRICS & GYNECOLOGY

## 2019-01-27 ENCOUNTER — ANESTHESIA EVENT (OUTPATIENT)
Dept: LABOR AND DELIVERY | Age: 29
DRG: 560 | End: 2019-01-27
Payer: MEDICAID

## 2019-01-27 ENCOUNTER — ANESTHESIA (OUTPATIENT)
Dept: LABOR AND DELIVERY | Age: 29
DRG: 560 | End: 2019-01-27
Payer: MEDICAID

## 2019-01-27 PROBLEM — O34.219 PREVIOUS CESAREAN DELIVERY AFFECTING PREGNANCY: Status: ACTIVE | Noted: 2019-01-27

## 2019-01-27 PROBLEM — Z37.9 NORMAL LABOR: Status: ACTIVE | Noted: 2019-01-27

## 2019-01-27 LAB
ABO + RH BLD: NORMAL
BLOOD GROUP ANTIBODIES SERPL: NORMAL
ERYTHROCYTE [DISTWIDTH] IN BLOOD BY AUTOMATED COUNT: 14.2 % (ref 11.5–14.5)
HCT VFR BLD AUTO: 35.6 % (ref 35–47)
HGB BLD-MCNC: 12.1 G/DL (ref 11.5–16)
MCH RBC QN AUTO: 29.2 PG (ref 26–34)
MCHC RBC AUTO-ENTMCNC: 34 G/DL (ref 30–36.5)
MCV RBC AUTO: 86 FL (ref 80–99)
NRBC # BLD: 0 K/UL (ref 0–0.01)
NRBC BLD-RTO: 0 PER 100 WBC
PLATELET # BLD AUTO: 148 K/UL (ref 150–400)
PMV BLD AUTO: 10.5 FL (ref 8.9–12.9)
RBC # BLD AUTO: 4.14 M/UL (ref 3.8–5.2)
SPECIMEN EXP DATE BLD: NORMAL
WBC # BLD AUTO: 5.5 K/UL (ref 3.6–11)

## 2019-01-27 PROCEDURE — 74011250636 HC RX REV CODE- 250/636

## 2019-01-27 PROCEDURE — 86900 BLOOD TYPING SEROLOGIC ABO: CPT

## 2019-01-27 PROCEDURE — 77030014125 HC TY EPDRL BBMI -B: Performed by: ANESTHESIOLOGY

## 2019-01-27 PROCEDURE — 74011000250 HC RX REV CODE- 250

## 2019-01-27 PROCEDURE — 99284 EMERGENCY DEPT VISIT MOD MDM: CPT

## 2019-01-27 PROCEDURE — 65410000002 HC RM PRIVATE OB

## 2019-01-27 PROCEDURE — 36415 COLL VENOUS BLD VENIPUNCTURE: CPT

## 2019-01-27 PROCEDURE — 74011250637 HC RX REV CODE- 250/637: Performed by: ADVANCED PRACTICE MIDWIFE

## 2019-01-27 PROCEDURE — 85027 COMPLETE CBC AUTOMATED: CPT

## 2019-01-27 RX ORDER — SODIUM CHLORIDE 0.9 % (FLUSH) 0.9 %
5-40 SYRINGE (ML) INJECTION EVERY 8 HOURS
Status: DISCONTINUED | OUTPATIENT
Start: 2019-01-27 | End: 2019-01-29 | Stop reason: HOSPADM

## 2019-01-27 RX ORDER — SODIUM CHLORIDE, SODIUM LACTATE, POTASSIUM CHLORIDE, CALCIUM CHLORIDE 600; 310; 30; 20 MG/100ML; MG/100ML; MG/100ML; MG/100ML
125 INJECTION, SOLUTION INTRAVENOUS CONTINUOUS
Status: DISCONTINUED | OUTPATIENT
Start: 2019-01-27 | End: 2019-01-27

## 2019-01-27 RX ORDER — OXYCODONE AND ACETAMINOPHEN 5; 325 MG/1; MG/1
1 TABLET ORAL
Status: DISCONTINUED | OUTPATIENT
Start: 2019-01-27 | End: 2019-01-29 | Stop reason: HOSPADM

## 2019-01-27 RX ORDER — FENTANYL CITRATE 50 UG/ML
INJECTION, SOLUTION INTRAMUSCULAR; INTRAVENOUS AS NEEDED
Status: DISCONTINUED | OUTPATIENT
Start: 2019-01-27 | End: 2019-01-27 | Stop reason: HOSPADM

## 2019-01-27 RX ORDER — HYDROCORTISONE ACETATE PRAMOXINE HCL 2.5; 1 G/100G; G/100G
CREAM TOPICAL AS NEEDED
Status: DISCONTINUED | OUTPATIENT
Start: 2019-01-27 | End: 2019-01-29 | Stop reason: HOSPADM

## 2019-01-27 RX ORDER — FENTANYL CITRATE 50 UG/ML
INJECTION, SOLUTION INTRAMUSCULAR; INTRAVENOUS
Status: COMPLETED
Start: 2019-01-27 | End: 2019-01-27

## 2019-01-27 RX ORDER — OXYCODONE AND ACETAMINOPHEN 5; 325 MG/1; MG/1
2 TABLET ORAL
Status: DISCONTINUED | OUTPATIENT
Start: 2019-01-27 | End: 2019-01-29 | Stop reason: HOSPADM

## 2019-01-27 RX ORDER — OXYTOCIN/RINGER'S LACTATE 20/1000 ML
999 PLASTIC BAG, INJECTION (ML) INTRAVENOUS AS NEEDED
Status: DISCONTINUED | OUTPATIENT
Start: 2019-01-27 | End: 2019-01-29 | Stop reason: HOSPADM

## 2019-01-27 RX ORDER — ACETAMINOPHEN 325 MG/1
650 TABLET ORAL
Status: DISCONTINUED | OUTPATIENT
Start: 2019-01-27 | End: 2019-01-29 | Stop reason: HOSPADM

## 2019-01-27 RX ORDER — DIPHENHYDRAMINE HCL 25 MG
25 CAPSULE ORAL
Status: DISCONTINUED | OUTPATIENT
Start: 2019-01-27 | End: 2019-01-29 | Stop reason: HOSPADM

## 2019-01-27 RX ORDER — ZOLPIDEM TARTRATE 5 MG/1
5 TABLET ORAL
Status: DISCONTINUED | OUTPATIENT
Start: 2019-01-27 | End: 2019-01-29 | Stop reason: HOSPADM

## 2019-01-27 RX ORDER — BUPIVACAINE HYDROCHLORIDE 5 MG/ML
INJECTION, SOLUTION EPIDURAL; INTRACAUDAL
Status: COMPLETED | OUTPATIENT
Start: 2019-01-27 | End: 2019-01-27

## 2019-01-27 RX ORDER — ONDANSETRON 2 MG/ML
4 INJECTION INTRAMUSCULAR; INTRAVENOUS
Status: DISCONTINUED | OUTPATIENT
Start: 2019-01-27 | End: 2019-01-27 | Stop reason: HOSPADM

## 2019-01-27 RX ORDER — TERBUTALINE SULFATE 1 MG/ML
0.25 INJECTION SUBCUTANEOUS AS NEEDED
Status: DISCONTINUED | OUTPATIENT
Start: 2019-01-27 | End: 2019-01-27 | Stop reason: HOSPADM

## 2019-01-27 RX ORDER — AMMONIA 15 % (W/V)
1 AMPUL (EA) INHALATION AS NEEDED
Status: DISCONTINUED | OUTPATIENT
Start: 2019-01-27 | End: 2019-01-29 | Stop reason: HOSPADM

## 2019-01-27 RX ORDER — DOCUSATE SODIUM 100 MG/1
100 CAPSULE, LIQUID FILLED ORAL
Status: DISCONTINUED | OUTPATIENT
Start: 2019-01-27 | End: 2019-01-29 | Stop reason: HOSPADM

## 2019-01-27 RX ORDER — BUPIVACAINE HYDROCHLORIDE 5 MG/ML
INJECTION, SOLUTION EPIDURAL; INTRACAUDAL
Status: COMPLETED
Start: 2019-01-27 | End: 2019-01-27

## 2019-01-27 RX ORDER — SODIUM CHLORIDE 0.9 % (FLUSH) 0.9 %
5-40 SYRINGE (ML) INJECTION AS NEEDED
Status: DISCONTINUED | OUTPATIENT
Start: 2019-01-27 | End: 2019-01-29 | Stop reason: HOSPADM

## 2019-01-27 RX ORDER — EPHEDRINE SULFATE 50 MG/ML
12.5 INJECTION, SOLUTION INTRAVENOUS
Status: DISCONTINUED | OUTPATIENT
Start: 2019-01-27 | End: 2019-01-27 | Stop reason: HOSPADM

## 2019-01-27 RX ORDER — HYDROCORTISONE 1 %
CREAM (GRAM) TOPICAL AS NEEDED
Status: DISCONTINUED | OUTPATIENT
Start: 2019-01-27 | End: 2019-01-29 | Stop reason: HOSPADM

## 2019-01-27 RX ORDER — BUPIVACAINE HYDROCHLORIDE 5 MG/ML
30 INJECTION, SOLUTION EPIDURAL; INTRACAUDAL AS NEEDED
Status: DISCONTINUED | OUTPATIENT
Start: 2019-01-27 | End: 2019-01-27 | Stop reason: HOSPADM

## 2019-01-27 RX ORDER — SIMETHICONE 80 MG
80 TABLET,CHEWABLE ORAL
Status: DISCONTINUED | OUTPATIENT
Start: 2019-01-27 | End: 2019-01-29 | Stop reason: HOSPADM

## 2019-01-27 RX ORDER — EPHEDRINE SULFATE 50 MG/ML
INJECTION, SOLUTION INTRAVENOUS
Status: DISCONTINUED
Start: 2019-01-27 | End: 2019-01-27

## 2019-01-27 RX ORDER — IBUPROFEN 400 MG/1
800 TABLET ORAL EVERY 8 HOURS
Status: DISCONTINUED | OUTPATIENT
Start: 2019-01-27 | End: 2019-01-29 | Stop reason: HOSPADM

## 2019-01-27 RX ORDER — NALBUPHINE HYDROCHLORIDE 10 MG/ML
10 INJECTION, SOLUTION INTRAMUSCULAR; INTRAVENOUS; SUBCUTANEOUS
Status: DISCONTINUED | OUTPATIENT
Start: 2019-01-27 | End: 2019-01-27 | Stop reason: HOSPADM

## 2019-01-27 RX ORDER — FENTANYL CITRATE 50 UG/ML
100 INJECTION, SOLUTION INTRAMUSCULAR; INTRAVENOUS
Status: DISCONTINUED | OUTPATIENT
Start: 2019-01-27 | End: 2019-01-27 | Stop reason: HOSPADM

## 2019-01-27 RX ORDER — ONDANSETRON 4 MG/1
4 TABLET, ORALLY DISINTEGRATING ORAL
Status: DISCONTINUED | OUTPATIENT
Start: 2019-01-27 | End: 2019-01-29 | Stop reason: HOSPADM

## 2019-01-27 RX ORDER — FENTANYL/BUPIVACAINE/NS/PF 2-1250MCG
1-16 PREFILLED PUMP RESERVOIR EPIDURAL CONTINUOUS
Status: DISCONTINUED | OUTPATIENT
Start: 2019-01-27 | End: 2019-01-27

## 2019-01-27 RX ORDER — NALOXONE HYDROCHLORIDE 0.4 MG/ML
0.4 INJECTION, SOLUTION INTRAMUSCULAR; INTRAVENOUS; SUBCUTANEOUS AS NEEDED
Status: DISCONTINUED | OUTPATIENT
Start: 2019-01-27 | End: 2019-01-27 | Stop reason: HOSPADM

## 2019-01-27 RX ORDER — OXYTOCIN/0.9 % SODIUM CHLORIDE 30/500 ML
PLASTIC BAG, INJECTION (ML) INTRAVENOUS
Status: COMPLETED
Start: 2019-01-27 | End: 2019-01-27

## 2019-01-27 RX ORDER — OXYTOCIN/RINGER'S LACTATE 20/1000 ML
125 PLASTIC BAG, INJECTION (ML) INTRAVENOUS AS NEEDED
Status: DISCONTINUED | OUTPATIENT
Start: 2019-01-27 | End: 2019-01-29 | Stop reason: HOSPADM

## 2019-01-27 RX ORDER — FENTANYL CITRATE 50 UG/ML
100 INJECTION, SOLUTION INTRAMUSCULAR; INTRAVENOUS ONCE
Status: DISCONTINUED | OUTPATIENT
Start: 2019-01-27 | End: 2019-01-27 | Stop reason: HOSPADM

## 2019-01-27 RX ADMIN — BUPIVACAINE HYDROCHLORIDE 5 MG: 5 INJECTION, SOLUTION EPIDURAL; INTRACAUDAL at 02:02

## 2019-01-27 RX ADMIN — IBUPROFEN 800 MG: 400 TABLET ORAL at 15:50

## 2019-01-27 RX ADMIN — OXYTOCIN-SODIUM CHLORIDE 0.9% IV SOLN 30 UNIT/500ML 30000 MILLI-UNITS: 30-0.9/5 SOLUTION at 02:55

## 2019-01-27 RX ADMIN — IBUPROFEN 800 MG: 400 TABLET ORAL at 05:28

## 2019-01-27 RX ADMIN — FENTANYL CITRATE 100 MCG: 50 INJECTION, SOLUTION INTRAMUSCULAR; INTRAVENOUS at 02:02

## 2019-01-27 RX ADMIN — OXYCODONE AND ACETAMINOPHEN 1 TABLET: 5; 325 TABLET ORAL at 21:01

## 2019-01-27 RX ADMIN — OXYCODONE AND ACETAMINOPHEN 1 TABLET: 5; 325 TABLET ORAL at 11:08

## 2019-01-27 RX ADMIN — IBUPROFEN 800 MG: 400 TABLET ORAL at 23:27

## 2019-01-27 RX ADMIN — DOCUSATE SODIUM 100 MG: 100 CAPSULE, LIQUID FILLED ORAL at 11:08

## 2019-01-27 NOTE — PROGRESS NOTES
2351: , 40.0wks, [facility] LDR 4 c/o contractions beginning around 2000 tonight. Pt speaks Italian,  phone used -  number [phone] on the phone for admission process. 0020: LALO Sales at bedside to assess pt. Admission orders received at this time. 0150: Dr. Rosie Jarrell at the bedside for epidural placement. 0225: Pt complaining of rectal pressure. SVE done. 0240: LALO Moore arrived for delivery. 65:  of live female  by LALO Sales. Apgars 8/9.     0500: TRANSFER - OUT REPORT:    Verbal report given to SAIRA Tam RN(name) on [facility]  being transferred to MIU(unit) for routine progression of care       Report consisted of patients Situation, Background, Assessment and   Recommendations(SBAR). Information from the following report(s) SBAR, Intake/Output, MAR and Recent Results was reviewed with the receiving nurse. Lines:   Peripheral IV 19 Right Hand (Active)   Site Assessment Clean, dry, & intact 2019  1:15 AM   Phlebitis Assessment 0 2019  1:15 AM   Infiltration Assessment 0 2019  1:15 AM   Dressing Status Clean, dry, & intact 2019  1:15 AM   Dressing Type Tape;Transparent 2019  1:15 AM   Hub Color/Line Status Pink; Infusing;Patent 2019  1:15 AM   Action Taken Blood drawn 2019  1:15 AM        Opportunity for questions and clarification was provided.       Patient transported with:   Registered Nurse

## 2019-01-27 NOTE — ROUTINE PROCESS
TRANSFER - IN REPORT:    Verbal report received from Yvette(name) on Hannah Morgan  being received from L&D(unit) for routine progression of care      Report consisted of patients Situation, Background, Assessment and   Recommendations(SBAR). Information from the following report(s) SBAR was reviewed with the receiving nurse. Opportunity for questions and clarification was provided. Assessment completed upon patients arrival to unit and care assumed.

## 2019-01-27 NOTE — L&D DELIVERY NOTE
Delivery Note    Obstetrician:  Zack Lawrence CNM     Assistant: none    Pre-Delivery Diagnosis: Term pregnancy, Spontaneous labor, Single fetus and Uncomplicated pregnancy    Post-Delivery Diagnosis: Living  infant(s) and Female    Intrapartum Event: None    Procedure: Spontaneous vaginal delivery    Epidural: YES    Monitor:  Fetal Heart Tones - External and Uterine Contractions - External    Indications for instrumental delivery: none    Estimated Blood Loss: 150    Episiotomy: n/a    Laceration(s):  none    Laceration(s) repair: NO    Presentation: Cephalic    Fetal Description: grover    Fetal Position: Occiput Anterior    Birth Weight: pending    Birth Length: pending    Apgar - One Minute: 8    Apgar - Five Minutes: 9    Umbilical Cord: Nuchal Cord x  3 and 3 vessels present    Specimens: NA           Complications:  none           Cord Blood Results:   Information for the patient's :  Edwin Gorman [[id number]]   No results found for: PCTABR, PCTDIG, BILI, ABORH    Prenatal Labs:     Lab Results   Component Value Date/Time    ABO/Rh(D) A POSITIVE 2019 01:05 AM    ABO,Rh A + 2018    HBsAg, External negative 2018    HIV, External non-reactive 2018    Rubella, External immune 2018    Gonorrhea, External negative 2018    Chlamydia, External negative 2018    GrBStrep, External negative 2019        Notes:  Term IUP delivery via  over intact perineum. Infant delivered in OA position. Nuchal cord x3 noted, loose and reduced. Shoulders and body easily followed with next push. Spontaneous cry noted. Infant placed on maternal abdomen, dried and stimulated. Infant covered in vernix and appears less than 40 wks GA. Cord clamped and cut by FOB after pulsations ceased. Placenta delivered spontaneously, complete and intact with 3VC. Fundus firm at umbilicus, midline. Hemostasis maintained. EBL 150ml. Mother and baby stable.  Skin to skin maintained.      Signed By:  Ladi Hernandez CNM     January 27, 2019

## 2019-01-27 NOTE — LACTATION NOTE
This note was copied from a baby's chart. Initial Lactation Consultation:  All teaching and discussion done with dad in [address] and with the  blue phone with mom. Mom is 33yo  delivered by her second  today at 0248 40 weeks gestation. Mom's medical history is negative for factors influencing lactation. Previously, mom breast fed her other two children for 13 months each. She had ample milk and no problems or concerns. Baby nursing well after delivery, deep latch obtained, mother is comfortable, baby feeding vigorously with rhythmic suck, swallow, breathe pattern, both breasts offered, baby is skin to skin for feeding.  behaviors reviewed, Very sleepy in first 24 hours, mother must wake baby for feedings, offer hand expressed drops, baby usually will respond and feed vigorously 6-8 times in the first day, but is important to offer 8-12 times,  After baby wakes from deep sleep usually on the 2nd or 3rd day a new behavior pattern follows. Frequent feeding during this brief behavioral phase preceeding milk transition is called cluster feeding. Typical  behavior: baby becomes vigorous at the breast and wants to feed frequently- every 1-2 hours for several feedings. This is the normal process by which the baby demands his/her supply. This type of frequent feeding is the stimulation which causes lactogenesis II (milk coming in). Feeding Plan: Mother will keep baby skin to skin as often as possible, feed on demand, 8-12x/day , respond to feeding cues, obtain latch, listen for audible swallowing, be aware of signs of oxytocin release/ cramping,thirst,sleepiness while breastfeeding, offer both breasts,and will not limit feedings. Mother agrees to utilize breast massage while nursing to facilitate lactogenesis.

## 2019-01-27 NOTE — ANESTHESIA POSTPROCEDURE EVALUATION
Post-Anesthesia Evaluation and Assessment    Patient: Lety Mota MRN: [id number]  SSN: [id number]    YOB: 1990  Age: 34 y.o. Sex: female      I have evaluated the patient and they are stable and ready for discharge from the PACU. Cardiovascular Function/Vital Signs  Visit Vitals  BP 95/54 (BP 1 Location: Right arm, BP Patient Position: At rest)   Pulse 77   Temp 37.2 °C (98.9 °F)   Resp 16   Ht 5' 1\" (1.549 m)   Wt 81.6 kg (180 lb)   SpO2 97%   Breastfeeding? Unknown   BMI 34.01 kg/m²       Patient is status post * No anesthesia type entered * anesthesia for * No procedures listed *. Nausea/Vomiting: None    Postoperative hydration reviewed and adequate. Pain:  Pain Scale 1: Numeric (0 - 10) (01/27/19 0300)  Pain Intensity 1: 0 (01/27/19 0300)   Managed    Neurological Status:   Neuro (WDL): Within Defined Limits (01/27/19 0300)   At baseline    Mental Status, Level of Consciousness: Alert and  oriented to person, place, and time    Pulmonary Status:   O2 Device: Room air (01/27/19 1394)   Adequate oxygenation and airway patent    Complications related to anesthesia: None    Post-anesthesia assessment completed. No concerns    Signed By: Maggie Issa MD     January 27, 2019              * No procedures listed *.    <BSHSIANPOST>    Visit Vitals  BP 95/54 (BP 1 Location: Right arm, BP Patient Position: At rest)   Pulse 77   Temp 37.2 °C (98.9 °F)   Resp 16   Ht 5' 1\" (1.549 m)   Wt 81.6 kg (180 lb)   SpO2 97%   Breastfeeding?  Unknown   BMI 34.01 kg/m²

## 2019-01-27 NOTE — ANESTHESIA PROCEDURE NOTES
Epidural Block    Start time: 1/27/2019 1:52 AM  End time: 1/27/2019 2:03 AM  Performed by: Adriano Sanchez MD  Authorized by: Adriano Sanchez MD     Pre-Procedure  Indication: labor epidural    Preanesthetic Checklist: risks and benefits discussed and timeout performed    Timeout Time: 01:52        Epidural:   Patient position:  Left lateral decubitus  Prep region:  Lumbar  Prep: Chlorhexidine    Location:  L2-3    Needle and Epidural Catheter:   Needle Type:  Tuohy  Needle Gauge:  17 G  Injection Technique:  Loss of resistance using air  Attempts:  1  Catheter Size:  19 G  Events: no blood with aspiration, no cerebrospinal fluid with aspiration, no paresthesia and negative aspiration test    Test Dose:  Bupivacaine 0.25% and negative    Assessment:   Catheter Secured:  Tegaderm and tape  Insertion:  Uncomplicated  Patient tolerance:  Patient tolerated the procedure well with no immediate complications

## 2019-01-27 NOTE — ROUTINE PROCESS
Bedside shift change report given to Rodolfo Nicolas RNC (oncoming nurse) by Carlos Stewart RN (offgoing nurse). Report included the following information SBAR.

## 2019-01-27 NOTE — H&P
Labor and Delivery Admission Note  2019    Patient is a 34 y.o. G3 P 2 40w0d dated by early [address]. Pt of Dr. Jiemnez Alvarez with [phone]. Presents tonight with c/o UCs that started earlier in the evening. Hx Primary c/s with successful . Plans on an epidural. Denies significant med/ob hx. Family at bedside. PNC: Blood type: A            RH: pos            Hep B: negative            Rubella: immune            GBS status: negative    OBHX:   OB History      Para Term  AB Living    3 2 2     2    SAB TAB Ectopic Molar Multiple Live Births              2          PMH:   Past Medical History:   Diagnosis Date    Headache        PSH:   Past Surgical History:   Procedure Laterality Date    HX  SECTION      HX GYN             OB/GYN: , C/S x1,  x1    Meds:   Prior to Admission Medications   Prescriptions Last Dose Informant Patient Reported? Taking? PNV No12-Iron-FA-DSS-OM-3 29 mg iron-1 mg -50 mg CPKD 2019 at Unknown time  Yes Yes   Sig: Take  by mouth. butalbital-acetaminophen-caff (FIORICET) -40 mg per capsule 2018 at Unknown time  No Yes   Sig: Take 1 Cap by mouth every four (4) hours as needed for Pain. Max Daily Amount: 6 Caps. doxylamine-pyridoxine, vit B6, (DICLEGIS) 10-10 mg TbEC DR tablet 2018 at Unknown time  No Yes   Sig: Take 1 Tab by mouth nightly. ondansetron (ZOFRAN ODT) 4 mg disintegrating tablet 2018 at Unknown time  No Yes   Sig: Take 1 Tab by mouth every eight (8) hours as needed for Nausea.       Facility-Administered Medications: None       Allergies: No Known Allergies    Pertinent ROS: Review of Systems - History obtained from chart review and the patient  General ROS: negative  Psychological ROS: negative  Respiratory ROS: no cough, shortness of breath, or wheezing  Cardiovascular ROS: no chest pain or dyspnea on exertion  Genito-Urinary ROS: no dysuria, trouble voiding, or hematuria  Neurological ROS: negative    FMH: History reviewed. No pertinent family history. SH:   Social History     Socioeconomic History    Marital status:      Spouse name: Not on file    Number of children: Not on file    Years of education: Not on file    Highest education level: Not on file   Social Needs    Financial resource strain: Not on file    Food insecurity - worry: Not on file    Food insecurity - inability: Not on file   [employer] needs - medical: Not on file   [employer] needs - non-medical: Not on file   Occupational History    Not on file   Tobacco Use    Smoking status: Never Smoker    Smokeless tobacco: Never Used   Substance and Sexual Activity    Alcohol use: No    Drug use: No    Sexual activity: Yes     Partners: Male   Other Topics Concern     Service Not Asked    Blood Transfusions Not Asked    Caffeine Concern Not Asked    Occupational Exposure Not Asked    Hobby Hazards Not Asked    Sleep Concern Not Asked    Stress Concern Not Asked    Weight Concern Not Asked    Special Diet Not Asked    Back Care Not Asked    Exercise Not Asked    Bike Helmet Not Asked    Seat Belt Not Asked    Self-Exams Not Asked   Social History Narrative    Not on file       OBJECTIVE:    Temp (24hrs), Av.6 °F (36.4 °C), Min:97.6 °F (36.4 °C), Max:97.6 °F (36.4 °C)    Visit Vitals  /80 (BP 1 Location: Left arm, BP Patient Position: Sitting)   Pulse 82   Temp 97.6 °F (36.4 °C)   Resp 16   Ht 5' 1\" (1.549 m)   Wt 81.6 kg (180 lb)   LMP 2018   BMI 34.01 kg/m²       FHR baseline 125 moderate variability, +accels, no decels  [place] 5-6 min    Exam:  General: alert  HEENT:  normal   Lungs:  clear  Cor:  RRR  Abdomen:  Soft, non-tender                    Clinical EFW 3300gms  Cervix:  4/80/-2  Fluid:  intact  Pelvimetry:  AP-good                      Arch- adequate                      Sidewalls- adequate                      Pelvis feels adequate for fetus.   Extremities:  normal, no edema      Impression:    at 40w0d   Reassuring FWB  Previous C/S with successful   Desires     Plan:   Admit  Routine labs  Epidural  Anticipate successful     Félix Willis CNM  12:23 AM

## 2019-01-28 PROCEDURE — 65410000002 HC RM PRIVATE OB

## 2019-01-28 PROCEDURE — 74011250637 HC RX REV CODE- 250/637: Performed by: ADVANCED PRACTICE MIDWIFE

## 2019-01-28 RX ORDER — IBUPROFEN 600 MG/1
600 TABLET ORAL
Qty: 40 TAB | Refills: 0 | Status: SHIPPED | OUTPATIENT
Start: 2019-01-28 | End: 2022-04-04

## 2019-01-28 RX ADMIN — IBUPROFEN 800 MG: 400 TABLET ORAL at 07:34

## 2019-01-28 RX ADMIN — IBUPROFEN 800 MG: 400 TABLET ORAL at 14:53

## 2019-01-28 RX ADMIN — OXYCODONE AND ACETAMINOPHEN 1 TABLET: 5; 325 TABLET ORAL at 14:53

## 2019-01-28 RX ADMIN — OXYCODONE AND ACETAMINOPHEN 1 TABLET: 5; 325 TABLET ORAL at 10:47

## 2019-01-28 NOTE — ROUTINE PROCESS
Bedside shift change report given to CARLOTA Gómez RN (oncoming nurse) by MARTELL Nuno RN (offgoing nurse). Report included the following information SBAR, Kardex, Intake/Output, MAR and Recent Results.

## 2019-01-28 NOTE — LACTATION NOTE
This note was copied from a baby's chart. I did not see the baby at the breast. Dad said the baby is nursing well and they have no questions or concerns about the breast feeding.

## 2019-01-28 NOTE — DISCHARGE SUMMARY
Obstetrical Discharge Summary     Name: Deshaun Mustafa MRN: [id number]  SSN: [id number]    YOB: 1990  Age: 34 y.o. Sex: female      Admit Date: 2019    Discharge Date: 2019     Admitting Physician: Aleyda Hillman MD     Attending Physician:  Addi Guerin MD     Admission Diagnoses: pregnancy  Normal labor  Previous  delivery affecting pregnancy    Discharge Diagnoses:   Information for the patient's :  Klever Ricketts [[id number]]   Delivery of a 2.6 kg female infant via [address], Spontaneous on 2019 at 2:48 AM  by . Apgars were 8 and 9. Additional Diagnoses:   Hospital Problems  Never Reviewed          Codes Class Noted POA    Previous  delivery affecting pregnancy ICD-10-CM: O34.219  ICD-9-CM: 654.20  2019 Unknown        Normal labor ICD-10-CM: O80, Z37.9  ICD-9-CM: 220  2019 Unknown             Lab Results   Component Value Date/Time    Rubella, External immune 2018    GrBStrep, External negative 2019       Hospital Course: Normal hospital course following the delivery. Disposition at Discharge: Home or self care    Discharged Condition: Stable    Patient Instructions:   Current Discharge Medication List      START taking these medications    Details   ibuprofen (MOTRIN) 600 mg tablet Take 1 Tab by mouth every six (6) hours as needed for Pain. Qty: 40 Tab, Refills: 0         CONTINUE these medications which have NOT CHANGED    Details   PNV No12-Iron-FA-DSS-OM-3 29 mg iron-1 mg -50 mg CPKD Take  by mouth. STOP taking these medications       doxylamine-pyridoxine, vit B6, (DICLEGIS) 10-10 mg TbEC DR tablet Comments:   Reason for Stopping:         butalbital-acetaminophen-caff (FIORICET) -40 mg per capsule Comments:   Reason for Stopping:         ondansetron (ZOFRAN ODT) 4 mg disintegrating tablet Comments:   Reason for Stopping:               Reference my discharge instructions.     Follow-up Appointments   Procedures    FOLLOW UP VISIT Appointment in: 6 Weeks     Standing Status:   Standing     Number of Occurrences:   1     Order Specific Question:   Appointment in     Answer:   6 Weeks        Signed By:  Charmaine Hui MD     January 28, 2019

## 2019-01-28 NOTE — PROGRESS NOTES
Post-Partum Day Number 1 Progress Note    Joaquin Francis     Assessment: Doing well, post partum day 1    Plan:  1. Continue routine postpartum and perineal care as well as maternal education. 2. Female , doing well    Information for the patient's :  Simba Soto [[id number]]   [address], Spontaneous    Subjective:  Patient doing well without significant complaint. Voiding without difficulty, normal lochia. Vitals:  Visit Vitals  /72 (BP 1 Location: Right arm)   Pulse 98   Temp 97.4 °F (36.3 °C)   Resp 16   Ht 5' 1\" (1.549 m)   Wt 81.6 kg (180 lb)   LMP 2018   SpO2 97%   Breastfeeding? Unknown   BMI 34.01 kg/m²     Temp (24hrs), Av.9 °F (36.6 °C), Min:97.4 °F (36.3 °C), Max:98.2 °F (36.8 °C)        Exam:   Patient without distress. Abdomen soft, fundus firm, nontender                Perineum with normal lochia noted. Lower extremities are negative for swelling, cords or tenderness. Labs:     Lab Results   Component Value Date/Time    WBC 5.5 2019 01:05 AM    WBC 5.6 2018 11:38 PM    WBC 5.5 2018 09:27 AM    WBC 6.5 2017 10:31 PM    WBC 6.7 2016 03:56 PM    HGB 12.1 2019 01:05 AM    HGB 10.1 (L) 2018 12:32 AM    HGB 9.8 (L) 2018 11:38 PM    HGB 12.4 2018 09:27 AM    HGB 13.3 2017 10:31 PM    HGB 12.3 2016 03:56 PM    HCT 35.6 2019 01:05 AM    HCT 30.1 (L) 2018 12:32 AM    HCT 29.6 (L) 2018 11:38 PM    HCT 37.3 2018 09:27 AM    HCT 38.7 2017 10:31 PM    HCT 36.3 2016 03:56 PM    PLATELET 400 (L)  01:05 AM    PLATELET 987  11:38 PM    PLATELET 882  09:27 AM    PLATELET 142  10:31 PM    PLATELET 209  03:56 PM       No results found for this or any previous visit (from the past 24 hour(s)).

## 2019-01-29 VITALS
HEIGHT: 61 IN | BODY MASS INDEX: 33.99 KG/M2 | SYSTOLIC BLOOD PRESSURE: 112 MMHG | RESPIRATION RATE: 16 BRPM | WEIGHT: 180 LBS | HEART RATE: 71 BPM | TEMPERATURE: 98.2 F | OXYGEN SATURATION: 97 % | DIASTOLIC BLOOD PRESSURE: 76 MMHG

## 2019-01-29 PROBLEM — Z37.9 NORMAL LABOR: Status: RESOLVED | Noted: 2019-01-27 | Resolved: 2019-01-29

## 2019-01-29 PROBLEM — O42.90 AMNIOTIC FLUID LEAKING: Status: RESOLVED | Noted: 2019-01-25 | Resolved: 2019-01-29

## 2019-01-29 PROCEDURE — 74011250637 HC RX REV CODE- 250/637: Performed by: ADVANCED PRACTICE MIDWIFE

## 2019-01-29 RX ADMIN — ACETAMINOPHEN 650 MG: 325 TABLET ORAL at 07:08

## 2019-01-29 RX ADMIN — IBUPROFEN 800 MG: 400 TABLET ORAL at 09:39

## 2019-01-29 NOTE — PROGRESS NOTES
Bedside shift change report given to Edy Palencia RN (oncoming nurse) by SAIRA Pham RN (offgoing nurse). Report included the following information SBAR.     10:37 AM  Discussed discharge instructions and education with patient and her partner using the blue phone language line. Patient and her partner indicate understanding of discharge instructions. Aware to call for follow up appt in 6 weeks. Questions answered. RN verified with care management that infant will automatically be added to medicaid per parents request. 1 script given to patient.

## 2019-01-29 NOTE — ROUTINE PROCESS
1930:Bedside and Verbal shift change report given to SAIRA Farias (oncoming nurse) by Isi Quinn (offgoing nurse). Report included the following information SBAR.

## 2019-01-29 NOTE — DISCHARGE INSTRUCTIONS
POSTPARTUM DISCHARGE INSTRUCTIONS       Name:  Hannah Morgan  YOB: 1990  Admission Diagnosis:  pregnancy  Normal labor  Previous  delivery affecting pregnancy     Discharge Diagnosis:    Problem List as of 2019 Never Reviewed          Codes Class Noted - Resolved    Previous  delivery affecting pregnancy ICD-10-CM: O34.219  ICD-9-CM: 654.20  2019 - Present        Normal labor ICD-10-CM: O80, Z37.9  ICD-9-CM: 988  2019 - Present        Amniotic fluid leaking ICD-10-CM: O42.90  ICD-9-CM: 658.10  2019 - Present            Attending Physician:  Crystal Boyce MD    Delivery Type:  {Postpartum Delivery Types:20799}     Additional Information:  {Postpartum Pregnancy Complications:12977}    These are general instructions for a healthy lifestyle:    No smoking/ No tobacco products/ Avoid exposure to second hand smoke    Surgeon General's Warning:  Quitting smoking now greatly reduces serious risk to your health. Obesity, smoking, and sedentary lifestyle greatly increases your risk for illness    A healthy diet, regular physical exercise & weight monitoring are important for maintaining a healthy lifestyle    Recognize signs and symptoms of STROKE:    F-face looks uneven    A-arms unable to move or move unevenly    S-speech slurred or non-existent    T-time-call 911 as soon as signs and symptoms begin - DO NOT go       back to bed or wait to see if you get better - TIME IS BRAIN. I have had the opportunity to make my options or choices for discharge. I have received and understand these instructions. Patient Education       CARMEN WHEATN GVPTTDX: ISUHKPC Silvina  [ Postpartum: Care Instructions ]  ÅÑÔÇÏÇÊ Silvina Fay Sear UFMEC (CARMEN Owens ÈÚÏ ZBHCOGB)¡ íãÑ ÌÓãßö ÈÚÏÏ ãä ÇáÊÛíÑÇÊ. æÊÍÏË ÈÚÖ åÐå ÇáÊÛíÑÇÊ Úáì ãÏÇÑ ÚÏÉ ÃÓÇÈíÚ. Ýí ÇáÓÇÚÇÊ ÇáÊÇáíÉ ááæáÇÏÉ¡ íÈÏÃ ÌÓãßö Ýí ÇÓÊÑÏÇÏ ÚÇÝíÊå ÈÚÏ æáÇÏÉ ÇáØÝá æíßæä ãÄåáÇð áÅÑÖÇÚ ØÝáß ÍÏíË MFHBWVD ãä ÇáËÏí.  ÞÏ ÊÔÚÑíä ZLFUVWRQZ ÎáÇá åÐÇ ÇáæÞÊ. ÞÏ ÊõÛíøöÑ YQNDLTXKQ ÍÇáÊßö ÇáãÒÇÌíÉ ÈÏæä ÓÇÈÞ ÅäÐÇÑ áÓÈÈ ÛíÑ æÇÖÍ. æÝí ÇáÃÓÈæÚíä ÇáÃæáíä ÈÚÏ ÇáæáÇÏÉ¡ ÊÊÛíÑ ÚæÇØÝ ãÚÙã ÇáÓíÏÇÊ BESPZRAXSJI ãä ÇáÓÚÇÏÉ Åáì ÇáÍÒä. æÞÏ ÊÌÏíä ÕÚæÈÉ Ýí Çáäæã. æÞÏ ÊÈßíä ßËíÑðÇ. æåæ ãÇ íõØáÞ Úáíå \"ÇßÊÆÇÈ ãÇ ÈÚÏ ÇáæáÇÏÉ\". æÚÇÏÉð ãÇ ÊÎÊÝí åÐå RKPMCUKEOA ÎáÇá íæãíä Ãæ ÃÓÈæÚíä. æáßä ãä Çáãåã ãäÇÞÔÉ ãÔÇÚÑßö ãÚ ØÈíÈßö. íÓåá Ãä ÊÕÇÈí ÈÊÚÈ æÅÑåÇÞ ÔÏíÏíä ÎáÇá ÇáÃÓÇÈíÚ ÇáÃæáì ÈÚÏ ÇáæáÇÏÉ. áÇ ÊÍÇæáí ÇáÞíÇã VLMBFJ ßËíÑÉ. Erby Downy æÞÊãÇ íãßäß æÇÞÈáí ÇáãÓÇÚÏÉ ãä ÇáÂÎÑíä æÊäÇæáí ÇáØÚÇã ÌíÏðÇ æÇÔÑÈí ßãíÉ æÝíÑÉ ãä ÇáÓæÇÆá. æÈÚÏ ãíáÇÏ ØÝáß ÈÃÑÈÚÉ Åáì ÓÊÉ ÃÓÇÈíÚ ÊÞÑíÈðÇ¡ ÓÊÞæãíä ÈÒíÇÑÉ ØÈíÈßö ááãÊÇÈÚÉ. æåÐå ÇáÒíÇÑÉ åí æÞÊßö ÇáãÎÕÕ ááÊÍÏË Åáì ØÈíÈßö Úä Ãí ÔíÁ íÔÛáß Ãæ íßæä áÏíßö ÝÖæá ÈÔÃäå. ÊõÚÏ ãÊÇÈÚÉ ÇáÑÚÇíÉ ÌÒÁðÇ Jhoana Lauryn ÚáÇÌß æÓáÇãÊß. áÐÇ¡ ÇÍÑÕí Úáì ÊÑÊíÈ ãæÇÚíÏ ÒíÇÑÉ ÇáØÈíÈ VLIXPUNSM ÈåÇ ÌãíÚðÇ USHLWRCY ÈØÈíÈß ÅÐÇ ßäÊö ÊÚÇäíä ãä ãÔßáÇÊ. æãä ÇáÌíÏ ßÐáß ãÚÑÝÉ äÊÇÆÌ VPPIZJTF CBRECLPRJ ÈÞÇÆãÉ ÇáÃÏæíÉ ÇáÊí IHPLCEVSBB. ßíÝ íãßäßö ÇáÇÚÊäÇÁ ÈäÝÓß Ýí HSBWCL¿   · äÇãí Ãæ ÇÓÊÑíÍí ÚäÏãÇ íäÇã ØÝáßö.  · Berneice Dynes - ÅÐÇ ßÇä FRNAWLJO - EGKJCZD Ãæ ÇáÃÕÏÞÇÁ Ýí ATPWWOR UUVWWWXN. áÇ ÊÍÇæáí ÇáÞíÇã ÈÌãíÚ ARYMOJS ÇáãäÒáíÉ ÈäÝÓß.  · ÅÐÇ ßäÊö ãÕÇÈÉ ÈÇáÈæÇÓíÑ Ãæ ÈÊæÑã Ãæ Ãáã Íæá ÝÊÍÉ ELRMSQ¡ ÝÍÇæáí ÇÓÊÎÏÇã ÇáÈÑæÏÉ æÇáÓÎæäÉ. íãßäßö æÖÚ ËáÌ Ãæ ßãÇÏÉ ÈÇÑÏÉ Úáì ÇáãäØÞÉ ÇáãáÊåÈÉ áãÏÉ ÊÊÑÇæÍ ãä 10 ÏÞÇÆÞ Åáì 20 ÏÞíÞÉ Ýí ÇáãÑÉ ÇáæÇÍÏÉ. Tarun Taz ÞãÇÔ ÑÞíÞÉ Èíä ÇáËáÌ æÌáÏßö. æÃíÖðÇ ÍÇæáí ÇáÌáæÓ Úáì ÈõÚÏ ÈæÕÇÊ ÞáíáÉ ãä ÍãÇã ãÇÆí ÏÇÝÆ (ÍãÇã ÓíÊÒ) 3 ãÑÇÊ íæãíðÇ æÈÚÏ ÇáÊÈÑÒ.  · ÊäÇæáí ÃÏæíÉ ÊÓßíä ÇáÃáã ÈÏÞÉ æÝÞðÇ áÅÑÔÇÏÇÊ ÇáØÈíÈ. o o ÅÐÇ ÃÚØÇßö ÇáØÈíÈ ÏæÇÁð ãä ÇáÃÏæíÉ ÇáÊí ÊõÕÑÝ ÈæÕÝÉ ØÈíÉ áÚáÇÌ ÇáÃáã¡ ÝÊäÇæáíå æÝÞ ÅÑÔÇÏÇÊå. o o ÅÐÇ áã ÊÃÎÐí ÏæÇÁð áÚáÇÌ ÇáÃáã ãä ÇáÃÏæíÉ ÇáÊí ÊõÕÑÝ ÈæÕÝÉ ØÈíÉ¡ ÝÓáöí ØÈíÈß ÅÐÇ ßÇä ÈÅãßÇäß ÃÎÐ ÏæÇÁ ãä ÇáÃÏæíÉ ÇáÊí ÊõÕÑÝ ÈÏæä æÕÝÉ ØÈíÉ.  · ÊäÇæáí ßãíÇÊ ÅÖÇÝíÉ ãä ÇáÃáíÇÝ áÊÌäÈ ÇáÅãÓÇß.  æáíßä Öãä ãÇ WMBQWDZJG ãä ÃØÚãÉ ÇáÎÈÒ ÇáãÕäæÚ ãä ÇáÍÈæÈ DTQBJSX FQZSIRO JVBHMSUIE ÇáäíÆÉ RFUDANFRONALD ÇáäíÆÉ æÇáãÌÝÝÉ TUJXLTYYYG.  · ÇÔÑÈí ßãíÉ æÝíÑÉ ãä JSMAOZA ÈãÇ íßÝí áÃä íÕÈÍ áæä Èæáß ÃÕÝÑ BRZJHF Ãæ ÕÇÝíðÇ ãËá ÇáãÇÁ. ÅÐÇ ßäÊö ãÕÇÈÉ ÈÃãÑÇÖ Çáßõáì Ãæ ÇáÞáÈ Ãæ ÇáßÈÏ æÚáíß Ãä ÊáÊÒãí ÈÊÞáíá XGQFWXW¡ ÝÊÍÏËí ãÚ ØÈíÈß ÞÈá Ãä ÊÞæãí ÈÒíÇÏÉ ßãíÉ LOBDCLU ÇáÊí ÊÔÑÈíäåÇ.  · áÇ ÊÔØÝí ãåÈáßö ãä VDJHUA UEDBWKQX ÇáÓæÇÆá (ÇáÏÔ ÇáãåÈáí).  · ÚäÏ æÌæÏ ÎíÇØÉ ÌÑÇÍíÉ¡ ÝÍÇÝÙí Úáì äÙÇÝÉ ÇáãäØÞÉ ÈÕÈ ãÇÁ ÏÇÝÆ Ãæ ÈÎå Úáì ÇáãäØÞÉ ÇáæÇÞÚÉ ÎÇÑÌ HPYMMD æÝÊÍÉ ÇáÔÑÌ ÈÚÏ ÇÓÊÎÏÇã PDKTKXX.  · ÇÍÊÝÙí ÈÞÇÆãÉ ZFCSCERV áÇÕØÍÇÈåÇ ãÚßö Åáì ÇáÒíÇÑÉ ÇáÊí ÓÊÞæãíä ÈåÇ ááØÈíÈ ÈÚÏ ÇáæáÇÏÉ. æÇáÊí ÞÏ Êßæä Íæá:   o o ÇáÊÛíÑÇÊ ÇáÊí ÊÍÏË Ýí ÇáËÏí ãËá TZKNJDMA ÇááÍãíÉ Ãæ ÇáæÎÒ. o o ãæÚÏ ÊæÞÚ ÈÏÇíÉ ÇáÏæÑÉ ÇáÔåÑíÉ ÇáÊÇáíÉ. o o ÃÝÖá ÃÔßÇá ÊäÙíã ÇáäÓá ÈÇáäÓÈÉ áßö. o o ÇáæÒä ÇáÐí íõÖÇÝ Úáì æÒäß ÃËäÇÁ ÇáÍãá. o o ÎíÇÑÇÊ ÇáÊãÇÑíä. o o ÃÝÖá TSZHCBMQD UNUHNFOT GKKACQI áßö¡ ÎÇÕÉ ÅÐÇ ßäÊ ÊÑÖÚíä ØÝáß ÑÖÇÚÉ ØÈíÚíÉ. o o JNWTWWKE ÇáÊí ÞÏ ÊæÇÌåíäåÇ ãÚ ÇáÑÖÇÚÉ ÇáØÈíÚíÉ. o o ÃæÞÇÊ ÇáãÚÇÔÑÉ ÇáÌäÓíÉ. ÞÏ ÊÑÛÈ ÈÚÖ ÇáäÓÇÁ Ýí MBYSOZ Úä RYFHCIKRXL EBWOQOCD (ãÎÝÝÇÊ JFEMYPSY). o o ÝÖáÇð Úä ãÔÇÚÑ ÇáÍÒä Ãæ VKGUPRB ÇáÊí ÊÚÇäíä ãäåÇ. ãÊì íÌÈ SNESHBM áØáÈ ÇáãÓÇÚÏÉ¿  ÇÊÕáí ÈÇáÑÞã 911 Ýí Ãí æÞÊ æÞÊ ÊÑíä Ýíå ÍÇÌÊß Åáì ÑÚÇíÉ ØÇÑÆÉ. Úáì ÓÈíá MGKZSH¡ ÇÊÕá ÅÐÇ:   · ÊæáÏÊ áÏíß ÃÝßÇÑ ÈÇáÅÖÑÇÑ ÈäÝÓß Ãæ ÈØÝáß Ãæ ÈÔÎÕ ÂÎÑ.  · ÝÞÏÇä ÇáæÚí. ÇÊÕáí ÈØÈíÈß ÇáÂä Ãæ ÇØáÈí ÇáÑÚÇíÉ ÇáØÈíÉ ÇáÝæÑíÉ Ýí ÇáÍÇáÇÊ ÇáÊÇáíÉ:   · ÒíÇÏÉ ßËÇÝÉ ÇáäÒíÝ ÇáãåÈáí.  · Ãæ ÇáÔÚæÑ NCSNQFO Ãæ ÇáÏæÎÉ Ãæ ÇáÔÚæÑ BLJHCVI ÇáÅÛãÇÁ.  · ÅÕÇÈÊß ÈÍãì. ÑÇÞÈí ÈÔÏÉ Ãí ÊÛííÑÇÊ ÊØÑÃ Úáì ÕÍÊß¡ æÇÍÑÕí Úáì FNVCODC ÈÇáØÈíÈ Ýí ALVKOJX ÇáÊÇáíÉ:   · ÅÕÇÈÊß ÈÅÝÑÇÒÇÊ ÌÏíÏÉ Ýí ÇáãåÈá Ãæ ÒíÇÏÉ EATEIVWJR ÇáãæÌæÏÉ ÈÇáÝÚá.  · ÔÚæÑßö GXGBDN Ãæ ÇáßÂÈÉ.  · ßÇä áÏíßö ãÔßáÇÊ Ýí ÇáËÏí Ãæ ÇáÑÖÇÚÉ ÇáØÈíÚíÉ. Ãíä íãßä ãÚÑÝÉ ÇáãÒíÏ¿  ÇäÊÞÇá Åáì [email or web address] ÏÎæá [place]. íãßäß ãÚÑÝÉ ÇáãÒíÏ ãä ÎáÇá ãÑÈÚ ÇáÈÍË \"ÝÊÑÉ ãÇ ÈÚÏ ÇáæáÇÏÉ: ÅÑÔÇÏÇÊ ÇáÑÚÇíÉ - [ Postpartum: Care Instructions ]. \"  © 0892-4517 [employer].  ÊãÊ ÊåíÆÉ ÅÑÔÇÏÇÊ ÇáÚäÇíÉ ÈãæÌÈ ÊÑÎíÕ ãä ãÎÊÕ ÇáÑÚÇíÉ ÇáÕÍíÉ áÏíß. ÅÐÇ ßÇäÊ áÏíß ÃíÉ VXWXKKZLE Úä ÍÇáÉ ØÈíÉ Ãæ Ãí ãä åÐå HNSSAXVUE¡ ÝÊæÌå ÏæãðÇ NUNIUZO Åáì ãÎÊÕ ÇáÑÚÇíÉ ÇáÕÍíÉ. ÊäÝí ãäÙãÉ [facility] Ramona Minaya ÖãÇä Ãæ [place] Fabdelvin AVRHQNQ åÐå IAZTRZEAO.   ÅÕÏÇÑ ÇáãÍÊæì: 11.9 ãÍÏøË MNXRAMVN ãä: 25 NQ JVUNN [id number]

## 2019-01-29 NOTE — PROGRESS NOTES
Post-Partum Day Number 2 Progress Note    Joaquin Francis     Assessment:   Hospital Problems  Never Reviewed          Codes Class Noted POA    Previous  delivery affecting pregnancy ICD-10-CM: O34.219  ICD-9-CM: 654.20  2019 Unknown            Doing well, post partum day 2  S/p successful     Plan:   1. Discharge home today  2. Follow up in office in 6 weeks with Crystal Boyce MD   3. Post partum activity advised, diet as tolerated  4. Discharge Medications: ibuprofen, and medications prior to admission    Information for the patient's :  Figueroa Burton [[id number]]   [address], Spontaneous   Patient doing well without significant complaint. Voiding without difficulty, normal lochia. Current Facility-Administered Medications   Medication Dose Route Frequency    sodium chloride (NS) flush 5-40 mL  5-40 mL IntraVENous Q8H    ibuprofen (MOTRIN) tablet 800 mg  800 mg Oral Q8H       Vitals:  Visit Vitals  /66 (BP 1 Location: Right arm, BP Patient Position: At rest)   Pulse 75   Temp 97.7 °F (36.5 °C)   Resp 16   Ht 5' 1\" (1.549 m)   Wt 81.6 kg (180 lb)   LMP 2018   SpO2 97%   Breastfeeding? Unknown   BMI 34.01 kg/m²     Temp (24hrs), Av.8 °F (36.6 °C), Min:97.4 °F (36.3 °C), Max:98.2 °F (36.8 °C)      Exam:         Patient without distress. Abdomen soft, fundus firm, nontender                 Lower extremities are negative for swelling, cords or tenderness.     Labs:     Lab Results   Component Value Date/Time    WBC 5.5 2019 01:05 AM    WBC 5.6 2018 11:38 PM    WBC 5.5 2018 09:27 AM    HGB 12.1 2019 01:05 AM    HGB 10.1 (L) 2018 12:32 AM    HGB 9.8 (L) 2018 11:38 PM    HCT 35.6 2019 01:05 AM    HCT 30.1 (L) 2018 12:32 AM    HCT 29.6 (L) 2018 11:38 PM    PLATELET 198 (L)  01:05 AM    PLATELET 025  11:38 PM    PLATELET 985  09:27 AM       No results found for this or any previous visit (from the past 24 hour(s)).

## 2021-10-18 ENCOUNTER — APPOINTMENT (OUTPATIENT)
Dept: CT IMAGING | Age: 31
End: 2021-10-18
Attending: EMERGENCY MEDICINE
Payer: MEDICAID

## 2021-10-18 ENCOUNTER — HOSPITAL ENCOUNTER (EMERGENCY)
Age: 31
Discharge: HOME | End: 2021-10-18
Attending: EMERGENCY MEDICINE
Payer: MEDICAID

## 2021-10-18 VITALS
TEMPERATURE: 98.2 F | RESPIRATION RATE: 18 BRPM | DIASTOLIC BLOOD PRESSURE: 72 MMHG | OXYGEN SATURATION: 99 % | HEART RATE: 82 BPM | SYSTOLIC BLOOD PRESSURE: 118 MMHG

## 2021-10-18 DIAGNOSIS — R51.9 NONINTRACTABLE HEADACHE, UNSPECIFIED CHRONICITY PATTERN, UNSPECIFIED HEADACHE TYPE: ICD-10-CM

## 2021-10-18 DIAGNOSIS — R07.9 CHEST PAIN, UNSPECIFIED TYPE: Primary | ICD-10-CM

## 2021-10-18 DIAGNOSIS — R11.2 NAUSEA AND VOMITING, INTRACTABILITY OF VOMITING NOT SPECIFIED, UNSPECIFIED VOMITING TYPE: ICD-10-CM

## 2021-10-18 LAB
ALBUMIN SERPL-MCNC: 3.7 G/DL (ref 3.5–5)
ALBUMIN/GLOB SERPL: 0.7 {RATIO} (ref 1.1–2.2)
ALP SERPL-CCNC: 96 U/L (ref 45–117)
ALT SERPL-CCNC: 18 U/L (ref 12–78)
ANION GAP SERPL CALC-SCNC: 6 MMOL/L (ref 5–15)
AST SERPL-CCNC: 15 U/L (ref 15–37)
ATRIAL RATE: 92 BPM
BASOPHILS # BLD: 0 K/UL (ref 0–0.1)
BASOPHILS NFR BLD: 0 % (ref 0–1)
BILIRUB SERPL-MCNC: 0.5 MG/DL (ref 0.2–1)
BUN SERPL-MCNC: 11 MG/DL (ref 6–20)
BUN/CREAT SERPL: 21 (ref 12–20)
CALCIUM SERPL-MCNC: 9.4 MG/DL (ref 8.5–10.1)
CALCULATED P AXIS, ECG09: 44 DEGREES
CALCULATED R AXIS, ECG10: 45 DEGREES
CALCULATED T AXIS, ECG11: 35 DEGREES
CHLORIDE SERPL-SCNC: 105 MMOL/L (ref 97–108)
CO2 SERPL-SCNC: 23 MMOL/L (ref 21–32)
COMMENT, HOLDF: NORMAL
CREAT SERPL-MCNC: 0.52 MG/DL (ref 0.55–1.02)
DIAGNOSIS, 93000: NORMAL
DIFFERENTIAL METHOD BLD: ABNORMAL
EOSINOPHIL # BLD: 0.1 K/UL (ref 0–0.4)
EOSINOPHIL NFR BLD: 1 % (ref 0–7)
ERYTHROCYTE [DISTWIDTH] IN BLOOD BY AUTOMATED COUNT: 12.9 % (ref 11.5–14.5)
GLOBULIN SER CALC-MCNC: 5.4 G/DL (ref 2–4)
GLUCOSE SERPL-MCNC: 88 MG/DL (ref 65–100)
HCG UR QL: NEGATIVE
HCT VFR BLD AUTO: 39.1 % (ref 35–47)
HGB BLD-MCNC: 13.1 G/DL (ref 11.5–16)
IMM GRANULOCYTES # BLD AUTO: 0 K/UL (ref 0–0.04)
IMM GRANULOCYTES NFR BLD AUTO: 0 % (ref 0–0.5)
LYMPHOCYTES # BLD: 2.5 K/UL (ref 0.8–3.5)
LYMPHOCYTES NFR BLD: 54 % (ref 12–49)
MCH RBC QN AUTO: 27.2 PG (ref 26–34)
MCHC RBC AUTO-ENTMCNC: 33.5 G/DL (ref 30–36.5)
MCV RBC AUTO: 81.1 FL (ref 80–99)
MONOCYTES # BLD: 0.4 K/UL (ref 0–1)
MONOCYTES NFR BLD: 8 % (ref 5–13)
NEUTS SEG # BLD: 1.7 K/UL (ref 1.8–8)
NEUTS SEG NFR BLD: 37 % (ref 32–75)
NRBC # BLD: 0 K/UL (ref 0–0.01)
NRBC BLD-RTO: 0 PER 100 WBC
P-R INTERVAL, ECG05: 136 MS
PLATELET # BLD AUTO: 248 K/UL (ref 150–400)
PMV BLD AUTO: 9.2 FL (ref 8.9–12.9)
POTASSIUM SERPL-SCNC: 3.8 MMOL/L (ref 3.5–5.1)
PROT SERPL-MCNC: 9.1 G/DL (ref 6.4–8.2)
Q-T INTERVAL, ECG07: 344 MS
QRS DURATION, ECG06: 72 MS
QTC CALCULATION (BEZET), ECG08: 425 MS
RBC # BLD AUTO: 4.82 M/UL (ref 3.8–5.2)
SAMPLES BEING HELD,HOLD: NORMAL
SODIUM SERPL-SCNC: 134 MMOL/L (ref 136–145)
TROPONIN-HIGH SENSITIVITY: <4 NG/L (ref 0–51)
VENTRICULAR RATE, ECG03: 92 BPM
WBC # BLD AUTO: 4.7 K/UL (ref 3.6–11)

## 2021-10-18 PROCEDURE — 74011000636 HC RX REV CODE- 636: Performed by: EMERGENCY MEDICINE

## 2021-10-18 PROCEDURE — 36415 COLL VENOUS BLD VENIPUNCTURE: CPT

## 2021-10-18 PROCEDURE — 85025 COMPLETE CBC W/AUTO DIFF WBC: CPT

## 2021-10-18 PROCEDURE — 71275 CT ANGIOGRAPHY CHEST: CPT

## 2021-10-18 PROCEDURE — 99283 EMERGENCY DEPT VISIT LOW MDM: CPT

## 2021-10-18 PROCEDURE — 81025 URINE PREGNANCY TEST: CPT

## 2021-10-18 PROCEDURE — 70450 CT HEAD/BRAIN W/O DYE: CPT

## 2021-10-18 PROCEDURE — 80053 COMPREHEN METABOLIC PANEL: CPT

## 2021-10-18 PROCEDURE — 93005 ELECTROCARDIOGRAM TRACING: CPT

## 2021-10-18 PROCEDURE — 84484 ASSAY OF TROPONIN QUANT: CPT

## 2021-10-18 RX ORDER — ONDANSETRON 4 MG/1
4 TABLET, ORALLY DISINTEGRATING ORAL
Qty: 20 TABLET | Refills: 0 | Status: SHIPPED | OUTPATIENT
Start: 2021-10-18 | End: 2022-04-04

## 2021-10-18 RX ADMIN — IOPAMIDOL 100 ML: 755 INJECTION, SOLUTION INTRAVENOUS at 14:00

## 2021-10-18 NOTE — ED PROVIDER NOTES
HPI        Healthy 36y F here with 3 months of intermittent HA, chest pain, and nausea. Worse at night. Tried OTC meds with some relief. No fever. No rash. No vomiting. No diarrhea. No abdominal pain. No hx of similar. No focal weakness or numbness. No seizure activity reported. Hx obtained using Nepali . Past Medical History:   Diagnosis Date    Headache        Past Surgical History:   Procedure Laterality Date    HX  SECTION      HX GYN               No family history on file. Social History     Socioeconomic History    Marital status:      Spouse name: Not on file    Number of children: Not on file    Years of education: Not on file    Highest education level: Not on file   Occupational History    Not on file   Tobacco Use    Smoking status: Never Smoker    Smokeless tobacco: Never Used   Substance and Sexual Activity    Alcohol use: No    Drug use: No    Sexual activity: Yes     Partners: Male   Other Topics Concern     Service Not Asked    Blood Transfusions Not Asked    Caffeine Concern Not Asked    Occupational Exposure Not Asked    Hobby Hazards Not Asked    Sleep Concern Not Asked    Stress Concern Not Asked    Weight Concern Not Asked    Special Diet Not Asked    Back Care Not Asked    Exercise Not Asked    Bike Helmet Not Asked    [facility] Not Asked    Self-Exams Not Asked   Social History Narrative    Not on file     Social Determinants of Health     Financial Resource Strain:     Difficulty of Paying Living Expenses:    Food Insecurity:     Worried About Running Out of Food in the Last Year:     Ran Out of Food in the Last Year:    Transportation Needs:     Lack of Transportation (Medical):      Lack of Transportation (Non-Medical):    Physical Activity:     Days of Exercise per Week:     Minutes of Exercise per Session:    Stress:     Feeling of Stress :    Social Connections:     Frequency of Communication with Friends and Family:     Frequency of Social Gatherings with Friends and Family:     Attends [employer] Services:     Active Member of Clubs or Organizations:     Attends Club or Organization Meetings:     Marital Status:    Intimate Partner Violence:     Fear of Current or Ex-Partner:     Emotionally Abused:     Physically Abused:     Sexually Abused: ALLERGIES: Patient has no known allergies. Review of Systems   Review of Systems   Constitutional: (-) weight loss. HEENT: (-) stiff neck   Eyes: (-) discharge. Respiratory: (-) cough. Cardiovascular: (-) syncope. Gastrointestinal: (-) blood in stool. Genitourinary: (-) hematuria. Musculoskeletal: (-) myalgias. Neurological: (-) seizure. Skin: (-) petechiae  Lymph/Immunologic: (-) enlarged lymph nodes  All other systems reviewed and are negative. Vitals:    10/18/21 0949   BP: 119/82   Pulse: 88   Resp: 16   Temp: 96.9 °F (36.1 °C)   SpO2: 98%            Physical Exam Nursing note and vitals reviewed. Constitutional: oriented to person, place, and time. appears well-developed and well-nourished. No distress. Head: Normocephalic and atraumatic. Sclera anicteric  Nose: No rhinorrhea  Mouth/Throat: Oropharynx is clear and moist. Pharynx normal  Eyes: Conjunctivae are normal. Pupils are equal, round, and reactive to light. Right eye exhibits no discharge. Left eye exhibits no discharge. Neck: Painless normal range of motion. Neck supple. No LAD. Cardiovascular: Normal rate, regular rhythm, normal heart sounds and intact distal pulses. Exam reveals no gallop and no friction rub. No murmur heard. Pulmonary/Chest:  No respiratory distress. No wheezes. No rales. No rhonchi. No increased work of breathing. No accessory muscle use. Good air exchange throughout. Abdominal: soft, non-tender, no rebound or guarding. No hepatosplenomegaly. Normal bowel sounds throughout.   Back: no tenderness to palpation, no deformities, no CVA tenderness  Extremities/Musculoskeletal: Normal range of motion. no tenderness. No edema. Distal extremities are neurovasc intact. Lymphadenopathy:   No adenopathy. Neurological:  Alert and oriented to person, place, and time. Coordination normal. CN 2-12 intact. Motor and sensory function intact. Skin: Skin is warm and dry. No rash noted. No pallor. MDM 31y F here with MESSINA, chest pain, shortness of breath. Ongoing x 3 months. Reassuring exam. Plan for labs and imaging. Procedures      ED EKG interpretation:  Rhythm: normal sinus rhythm; and regular . Rate (approx.): 92; Axis: normal; P wave: normal; QRS interval: normal ; ST/T wave: normal;  This EKG was interpreted by Jero Gupta MD,ED Provider.

## 2022-03-10 ENCOUNTER — APPOINTMENT (OUTPATIENT)
Dept: ULTRASOUND IMAGING | Age: 32
End: 2022-03-10
Attending: PHYSICIAN ASSISTANT
Payer: MEDICAID

## 2022-03-10 ENCOUNTER — HOSPITAL ENCOUNTER (EMERGENCY)
Age: 32
Discharge: HOME | End: 2022-03-10
Attending: EMERGENCY MEDICINE
Payer: MEDICAID

## 2022-03-10 VITALS
OXYGEN SATURATION: 100 % | DIASTOLIC BLOOD PRESSURE: 80 MMHG | HEART RATE: 89 BPM | RESPIRATION RATE: 16 BRPM | SYSTOLIC BLOOD PRESSURE: 115 MMHG | TEMPERATURE: 98.3 F

## 2022-03-10 DIAGNOSIS — Z32.01 PREGNANCY TEST PERFORMED, PREGNANCY CONFIRMED: Primary | ICD-10-CM

## 2022-03-10 DIAGNOSIS — R10.30 LOWER ABDOMINAL PAIN: ICD-10-CM

## 2022-03-10 LAB
ALBUMIN SERPL-MCNC: 3.4 G/DL (ref 3.5–5)
ALBUMIN/GLOB SERPL: 0.7 {RATIO} (ref 1.1–2.2)
ALP SERPL-CCNC: 69 U/L (ref 45–117)
ALT SERPL-CCNC: 32 U/L (ref 12–78)
ANION GAP SERPL CALC-SCNC: 4 MMOL/L (ref 5–15)
APPEARANCE UR: CLEAR
AST SERPL-CCNC: 18 U/L (ref 15–37)
BACTERIA URNS QL MICRO: NEGATIVE /HPF
BASOPHILS # BLD: 0 K/UL (ref 0–0.1)
BASOPHILS NFR BLD: 0 % (ref 0–1)
BILIRUB SERPL-MCNC: 0.1 MG/DL (ref 0.2–1)
BILIRUB UR QL: NEGATIVE
BUN SERPL-MCNC: 12 MG/DL (ref 6–20)
BUN/CREAT SERPL: 22 (ref 12–20)
CALCIUM SERPL-MCNC: 9.3 MG/DL (ref 8.5–10.1)
CHLORIDE SERPL-SCNC: 105 MMOL/L (ref 97–108)
CO2 SERPL-SCNC: 24 MMOL/L (ref 21–32)
COLOR UR: ABNORMAL
CREAT SERPL-MCNC: 0.55 MG/DL (ref 0.55–1.02)
DIFFERENTIAL METHOD BLD: ABNORMAL
EOSINOPHIL # BLD: 0.1 K/UL (ref 0–0.4)
EOSINOPHIL NFR BLD: 1 % (ref 0–7)
EPITH CASTS URNS QL MICRO: ABNORMAL /LPF
ERYTHROCYTE [DISTWIDTH] IN BLOOD BY AUTOMATED COUNT: 13.1 % (ref 11.5–14.5)
GLOBULIN SER CALC-MCNC: 5.2 G/DL (ref 2–4)
GLUCOSE SERPL-MCNC: 88 MG/DL (ref 65–100)
GLUCOSE UR STRIP.AUTO-MCNC: NEGATIVE MG/DL
HCG SERPL-ACNC: 2056 MIU/ML (ref 0–6)
HCT VFR BLD AUTO: 34.9 % (ref 35–47)
HGB BLD-MCNC: 12 G/DL (ref 11.5–16)
HGB UR QL STRIP: ABNORMAL
HYALINE CASTS URNS QL MICRO: ABNORMAL /LPF (ref 0–5)
IMM GRANULOCYTES # BLD AUTO: 0 K/UL (ref 0–0.04)
IMM GRANULOCYTES NFR BLD AUTO: 0 % (ref 0–0.5)
KETONES UR QL STRIP.AUTO: ABNORMAL MG/DL
LEUKOCYTE ESTERASE UR QL STRIP.AUTO: NEGATIVE
LIPASE SERPL-CCNC: 103 U/L (ref 73–393)
LYMPHOCYTES # BLD: 3.5 K/UL (ref 0.8–3.5)
LYMPHOCYTES NFR BLD: 51 % (ref 12–49)
MCH RBC QN AUTO: 27.8 PG (ref 26–34)
MCHC RBC AUTO-ENTMCNC: 34.4 G/DL (ref 30–36.5)
MCV RBC AUTO: 80.8 FL (ref 80–99)
MONOCYTES # BLD: 0.6 K/UL (ref 0–1)
MONOCYTES NFR BLD: 8 % (ref 5–13)
NEUTS SEG # BLD: 2.7 K/UL (ref 1.8–8)
NEUTS SEG NFR BLD: 40 % (ref 32–75)
NITRITE UR QL STRIP.AUTO: NEGATIVE
NRBC # BLD: 0 K/UL (ref 0–0.01)
NRBC BLD-RTO: 0 PER 100 WBC
PH UR STRIP: 5.5 [PH] (ref 5–8)
PLATELET # BLD AUTO: 258 K/UL (ref 150–400)
PMV BLD AUTO: 9.2 FL (ref 8.9–12.9)
POTASSIUM SERPL-SCNC: 3.2 MMOL/L (ref 3.5–5.1)
PROT SERPL-MCNC: 8.6 G/DL (ref 6.4–8.2)
PROT UR STRIP-MCNC: NEGATIVE MG/DL
RBC # BLD AUTO: 4.32 M/UL (ref 3.8–5.2)
RBC #/AREA URNS HPF: ABNORMAL /HPF (ref 0–5)
SODIUM SERPL-SCNC: 133 MMOL/L (ref 136–145)
SP GR UR REFRACTOMETRY: 1.03 (ref 1–1.03)
UR CULT HOLD, URHOLD: NORMAL
UROBILINOGEN UR QL STRIP.AUTO: 0.2 EU/DL (ref 0.2–1)
WBC # BLD AUTO: 6.8 K/UL (ref 3.6–11)
WBC URNS QL MICRO: ABNORMAL /HPF (ref 0–4)

## 2022-03-10 PROCEDURE — 76801 OB US < 14 WKS SINGLE FETUS: CPT

## 2022-03-10 PROCEDURE — 83690 ASSAY OF LIPASE: CPT

## 2022-03-10 PROCEDURE — 76705 ECHO EXAM OF ABDOMEN: CPT

## 2022-03-10 PROCEDURE — 85025 COMPLETE CBC W/AUTO DIFF WBC: CPT

## 2022-03-10 PROCEDURE — 84702 CHORIONIC GONADOTROPIN TEST: CPT

## 2022-03-10 PROCEDURE — 81001 URINALYSIS AUTO W/SCOPE: CPT

## 2022-03-10 PROCEDURE — 80053 COMPREHEN METABOLIC PANEL: CPT

## 2022-03-10 PROCEDURE — 74011250637 HC RX REV CODE- 250/637: Performed by: PHYSICIAN ASSISTANT

## 2022-03-10 PROCEDURE — 99284 EMERGENCY DEPT VISIT MOD MDM: CPT

## 2022-03-10 RX ORDER — FOLIC ACID/MULTIVIT,IRON,MINER 0.4MG-18MG
1 TABLET ORAL DAILY
Qty: 30 TABLET | Refills: 0 | Status: SHIPPED | OUTPATIENT
Start: 2022-03-10

## 2022-03-10 RX ORDER — ACETAMINOPHEN 325 MG/1
650 TABLET ORAL
Status: COMPLETED | OUTPATIENT
Start: 2022-03-10 | End: 2022-03-10

## 2022-03-10 RX ADMIN — ACETAMINOPHEN 650 MG: 325 TABLET ORAL at 19:47

## 2022-03-11 NOTE — ED PROVIDER NOTES
Barbara Bermudez is a A0, 36 y.o. female with PMhx of HAs, who presents to ED with cc of lower back pain and lower abdominal pain x 1 week. States she is on birth control pills but took a home pregnancy test today and noted it was positive. States abdominal pain is all the way across her lower abdomen. Denies abnormal vaginal discharge or bleeding. LMP on 2022. States she called her GYN today and made an appointment for 3/21/22. Endorses urinary frequency. Denies dysuria. Pt denies additional symptoms of F/C, cough, congestion, CP, SOB, constipation, diarrhea, nausea, vomiting. PMHx: Reviewed, as below. PSHx: Reviewed, as below. PCP: Robyn Potts MD  GYN: Dr. Deloris Don, [facility]  : [id number]    There are no other complaints verbalized at this time. Past Medical History:   Diagnosis Date    Headache        Past Surgical History:   Procedure Laterality Date    HX  SECTION      HX GYN               History reviewed. No pertinent family history.     Social History     Socioeconomic History    Marital status:      Spouse name: Not on file    Number of children: Not on file    Years of education: Not on file    Highest education level: Not on file   Occupational History    Not on file   Tobacco Use    Smoking status: Never Smoker    Smokeless tobacco: Never Used   Substance and Sexual Activity    Alcohol use: No    Drug use: No    Sexual activity: Yes     Partners: Male   Other Topics Concern     Service Not Asked    Blood Transfusions Not Asked    Caffeine Concern Not Asked    Occupational Exposure Not Asked    Hobby Hazards Not Asked    Sleep Concern Not Asked    Stress Concern Not Asked    Weight Concern Not Asked    Special Diet Not Asked    Back Care Not Asked    Exercise Not Asked    Bike Helmet Not Asked    [address] Not Asked    Self-Exams Not Asked   Social History Narrative    Not on file     Social Determinants of Health     Financial Resource Strain:     Difficulty of Paying Living Expenses: Not on file   Food Insecurity:     Worried About Running Out of Food in the Last Year: Not on file    Chely of Food in the Last Year: Not on file   Transportation Needs:     Lack of Transportation (Medical): Not on file    Lack of Transportation (Non-Medical): Not on file   Physical Activity:     Days of Exercise per Week: Not on file    Minutes of Exercise per Session: Not on file   Stress:     Feeling of Stress : Not on file   Social Connections:     Frequency of Communication with Friends and Family: Not on file    Frequency of Social Gatherings with Friends and Family: Not on file    Attends Confucianist Services: Not on file    Active Member of [address] [place] [address] [place] or Organizations: Not on file    Attends Club or Organization Meetings: Not on file    Marital Status: Not on file   Intimate Partner Violence:     Fear of Current or Ex-Partner: Not on file    Emotionally Abused: Not on file    Physically Abused: Not on file    Sexually Abused: Not on file   Housing Stability:     Unable to Pay for Housing in the Last Year: Not on file    Number of [place] in the Last Year: Not on file    Unstable Housing in the Last Year: Not on file         ALLERGIES: Patient has no known allergies. Review of Systems   Constitutional: Negative for chills and fever. HENT: Negative for congestion. Respiratory: Negative for cough and shortness of breath. Cardiovascular: Negative for chest pain. Gastrointestinal: Positive for abdominal pain. Negative for constipation, diarrhea, nausea and vomiting. Genitourinary: Positive for frequency. Negative for dysuria, vaginal bleeding and vaginal discharge. Musculoskeletal: Positive for back pain. All other systems reviewed and are negative.       Vitals:    03/10/22 1927 03/10/22 2017   BP: (!) 140/84 115/80   Pulse: (!) 104 89   Resp: 18 16   Temp: 98.5 °F (36.9 °C) 98.3 °F (36.8 °C)   SpO2: 99% 100%            Physical Exam  Vitals and nursing note reviewed. Constitutional:       Appearance: Normal appearance. She is not diaphoretic. HENT:      Head: Atraumatic. Right Ear: External ear normal.      Left Ear: External ear normal.   Eyes:      Conjunctiva/sclera: Conjunctivae normal.   Cardiovascular:      Rate and Rhythm: Normal rate and regular rhythm. Heart sounds: Normal heart sounds. No murmur heard. No friction rub. No gallop. Pulmonary:      Effort: No respiratory distress. Breath sounds: Normal breath sounds. No stridor. No wheezing, rhonchi or rales. Abdominal:      General: Bowel sounds are normal. There is no distension. Palpations: Abdomen is soft. Tenderness: There is abdominal tenderness in the right lower quadrant, suprapubic area and left lower quadrant. There is no right CVA tenderness, left CVA tenderness, guarding or rebound. Hernia: No hernia is present. Musculoskeletal:         General: No swelling. Cervical back: Normal range of motion. No rigidity. Skin:     General: Skin is warm and dry. Neurological:      Mental Status: She is alert and oriented to person, place, and time. Mental status is at baseline.           MDM  Number of Diagnoses or Management Options     Amount and/or Complexity of Data Reviewed  Clinical lab tests: ordered and reviewed  Tests in the radiology section of CPT®: ordered and reviewed  Discuss the patient with other providers: yes (Dr Kenia John, ED attending)           Procedures             : [id number] used at time of discharge          VITAL SIGNS:  Vitals:    03/10/22 1927 03/10/22 2017   BP: (!) 140/84 115/80   Pulse: (!) 104 89   Resp: 18 16   Temp: 98.5 °F (36.9 °C) 98.3 °F (36.8 °C)   SpO2: 99% 100%         LABS:  Recent Results (from the past 24 hour(s))   CBC WITH AUTOMATED DIFF    Collection Time: 03/10/22  8:03 PM   Result Value Ref Range    WBC 6.8 3.6 - 11.0 K/uL    RBC 4.32 3.80 - 5.20 M/uL    HGB 12.0 11.5 - 16.0 g/dL    HCT 34.9 (L) 35.0 - 47.0 %    MCV 80.8 80.0 - 99.0 FL    MCH 27.8 26.0 - 34.0 PG    MCHC 34.4 30.0 - 36.5 g/dL    RDW 13.1 11.5 - 14.5 %    PLATELET 117 661 - 049 K/uL    MPV 9.2 8.9 - 12.9 FL    NRBC 0.0 0  WBC    ABSOLUTE NRBC 0.00 0.00 - 0.01 K/uL    NEUTROPHILS 40 32 - 75 %    LYMPHOCYTES 51 (H) 12 - 49 %    MONOCYTES 8 5 - 13 %    EOSINOPHILS 1 0 - 7 %    BASOPHILS 0 0 - 1 %    IMMATURE GRANULOCYTES 0 0.0 - 0.5 %    ABS. NEUTROPHILS 2.7 1.8 - 8.0 K/UL    ABS. LYMPHOCYTES 3.5 0.8 - 3.5 K/UL    ABS. MONOCYTES 0.6 0.0 - 1.0 K/UL    ABS. EOSINOPHILS 0.1 0.0 - 0.4 K/UL    ABS. BASOPHILS 0.0 0.0 - 0.1 K/UL    ABS. IMM. GRANS. 0.0 0.00 - 0.04 K/UL    DF AUTOMATED     METABOLIC PANEL, COMPREHENSIVE    Collection Time: 03/10/22  8:03 PM   Result Value Ref Range    Sodium 133 (L) 136 - 145 mmol/L    Potassium 3.2 (L) 3.5 - 5.1 mmol/L    Chloride 105 97 - 108 mmol/L    CO2 24 21 - 32 mmol/L    Anion gap 4 (L) 5 - 15 mmol/L    Glucose 88 65 - 100 mg/dL    BUN 12 6 - 20 MG/DL    Creatinine 0.55 0.55 - 1.02 MG/DL    BUN/Creatinine ratio 22 (H) 12 - 20      GFR est AA >60 >60 ml/min/1.73m2    GFR est non-AA >60 >60 ml/min/1.73m2    Calcium 9.3 8.5 - 10.1 MG/DL    Bilirubin, total 0.1 (L) 0.2 - 1.0 MG/DL    ALT (SGPT) 32 12 - 78 U/L    AST (SGOT) 18 15 - 37 U/L    Alk.  phosphatase 69 45 - 117 U/L    Protein, total 8.6 (H) 6.4 - 8.2 g/dL    Albumin 3.4 (L) 3.5 - 5.0 g/dL    Globulin 5.2 (H) 2.0 - 4.0 g/dL    A-G Ratio 0.7 (L) 1.1 - 2.2     LIPASE    Collection Time: 03/10/22  8:03 PM   Result Value Ref Range    Lipase 103 73 - 393 U/L   URINALYSIS W/MICROSCOPIC    Collection Time: 03/10/22  8:03 PM   Result Value Ref Range    Color YELLOW/STRAW      Appearance CLEAR CLEAR      Specific gravity 1.027 1.003 - 1.030      pH (UA) 5.5 5.0 - 8.0      Protein Negative NEG mg/dL    Glucose Negative NEG mg/dL    Ketone TRACE (A) NEG mg/dL    Bilirubin Negative NEG      Blood TRACE (A) NEG      Urobilinogen 0.2 0.2 - 1.0 EU/dL    Nitrites Negative NEG      Leukocyte Esterase Negative NEG      WBC 0-4 0 - 4 /hpf    RBC 0-5 0 - 5 /hpf    Epithelial cells FEW FEW /lpf    Bacteria Negative NEG /hpf    Hyaline cast 0-2 0 - 5 /lpf   URINE CULTURE HOLD SAMPLE    Collection Time: 03/10/22  8:03 PM    Specimen: Serum; Urine   Result Value Ref Range    Urine culture hold        Urine on hold in Microbiology dept for 2 days. If unpreserved urine is submitted, it cannot be used for addtional testing after 24 hours, recollection will be required. BETA HCG, QT    Collection Time: 03/10/22  8:03 PM   Result Value Ref Range    Beta HCG, QT 2,056 (H) 0 - 6 MIU/ML         IMAGING:  US PREG UTS < 14 WKS SNGL   Final Result      Gestational sac versus pseudogestational sac. Nonvisualization of the ovaries                  US ABD LTD   Final Result            Medications During Visit:  Medications   acetaminophen (TYLENOL) tablet 650 mg (650 mg Oral Given 3/10/22 1947)         DECISION MAKING:    Janes Castro is a 36 y.o. female who comes in as above. Presents afebrile and hemodynamically stable. Patient reports being on birth control pills however took a home pregnancy test today secondary to some lower abdominal pain and lower back pain and it was noted to be positive. Blood work and urine without evidence of infection or bacteriuria. Beta hCG elevated at 2056 which is consistent with early pregnancy. Ultrasound changed from a transvaginal as patient declines and was performed transabdominally. I have discussed these limits with patient and inability to see ovaries secondary to bowel gas. Results also demonstrating gestational sac versus pseudogestational sac. Given these results, I have discussed close return precautions for any evidence of ectopic pregnancy and will have her follow-up with gynecology for repeat ultrasound and beta hCG in 2 to 3 days.   Spoke with patient regarding stopping taking birth control given positive pregnancy as well as recommendation to start taking prenatal vitamins. I have discussed care with ED attending. Pt has been given close return precautions as well as follow up recommendations. Opportunity for questions presented. Pt verbalizes their understanding and agreement with care plan. IMPRESSION:  1. Pregnancy test performed, pregnancy confirmed    2. Lower abdominal pain        DISPOSITION:  Discharged      Discharge Medication List as of 3/10/2022 10:47 PM      START taking these medications    Details   prenatal vitamin (Prenatal) 28 mg iron- 800 mcg tab tablet Take 1 Tablet by mouth daily. , Print, Disp-30 Tablet, R-0         CONTINUE these medications which have NOT CHANGED    Details   ondansetron (Zofran ODT) 4 mg disintegrating tablet 1 Tablet by SubLINGual route every eight (8) hours as needed for Nausea or Vomiting., Print, Disp-20 Tablet, R-0      ibuprofen (MOTRIN) 600 mg tablet Take 1 Tab by mouth every six (6) hours as needed for Pain., Print, Disp-40 Tab, R-0      PNV No12-Iron-FA-DSS-OM-3 29 mg iron-1 mg -50 mg CPKD Take  by mouth., Historical Med              Follow-up Information     Follow up With Specialties Details Why Contact Info    Yeni Gtz MD Obstetrics & Gynecology, Gynecology, Obstetrics Schedule an appointment as soon as possible for a visit  Please schedule an appointment to be seen in 50 hours for a repeat ultrasound and a repeat beta hCG pregnancy hormone number [address] [facility] [address] [phone]      [address] [facility] [address] [facility] Emergency Medicine Go to  As needed, If symptoms worsen, if pain localizes to your right lower quadrant or to your left lower quadrant concerning for an ectopic pregnancy as we have discussed [address] [facility]  [phone]            The patient is asked to follow-up with their primary care provider and any other physicians as above.   We have discussed strict return precautions and the patient is asked to return promptly for any increased concerns or worsening of symptoms and for return precautions regarding their symptoms today. They can return to this emergency department or any other emergency department. I have discussed with them results as above and presented opportunity for questions. They verbalize their understanding of the above and agreement with care plan. Please note that this dictation was completed with [employer], the computer voice recognition software. Quite often unanticipated grammatical, syntax, homophones, and other interpretive errors are inadvertently transcribed by the computer software. Please disregard these errors. Please excuse any errors that have escaped final proofreading.

## 2022-03-11 NOTE — ED NOTES
Pt discharged with paperwork. Pt had no questions about discharge plan and was alert & oriented in no distress. Discharge instructions given in Romansh.

## 2022-03-11 NOTE — ED TRIAGE NOTES
Pt reports lower abdominal pain x1 week. Pt denies vaginal bleeding. Pt reports she I taking birth control. Pt had multiple + pregnancy tests. .

## 2022-03-11 NOTE — DISCHARGE INSTRUCTIONS
Please stop taking your birth control. Please start taking prenatal vitamins as I have prescribed to you. Please do not take ibuprofen but instead use Tylenol for your pain.

## 2022-03-18 PROBLEM — O34.219 PREVIOUS CESAREAN DELIVERY AFFECTING PREGNANCY: Status: ACTIVE | Noted: 2019-01-27

## 2022-03-24 ENCOUNTER — OFFICE VISIT (OUTPATIENT)
Dept: INTERNAL MEDICINE CLINIC | Age: 32
End: 2022-03-24
Payer: MEDICAID

## 2022-03-24 VITALS
OXYGEN SATURATION: 99 % | TEMPERATURE: 98.6 F | HEIGHT: 61 IN | SYSTOLIC BLOOD PRESSURE: 118 MMHG | BODY MASS INDEX: 28.32 KG/M2 | WEIGHT: 150 LBS | HEART RATE: 91 BPM | DIASTOLIC BLOOD PRESSURE: 78 MMHG

## 2022-03-24 DIAGNOSIS — Z00.00 PHYSICAL EXAM, ANNUAL: ICD-10-CM

## 2022-03-24 DIAGNOSIS — R51.9 HEADACHE DISORDER: Primary | ICD-10-CM

## 2022-03-24 DIAGNOSIS — Z3A.01 LESS THAN 8 WEEKS GESTATION OF PREGNANCY: ICD-10-CM

## 2022-03-24 DIAGNOSIS — E87.6 HYPOKALEMIA: ICD-10-CM

## 2022-03-24 PROCEDURE — 99204 OFFICE O/P NEW MOD 45 MIN: CPT | Performed by: NURSE PRACTITIONER

## 2022-03-24 RX ORDER — VERAPAMIL HYDROCHLORIDE 120 MG/1
120 TABLET, FILM COATED ORAL 2 TIMES DAILY
Qty: 60 TABLET | Refills: 1 | Status: SHIPPED | OUTPATIENT
Start: 2022-03-24 | End: 2022-06-13

## 2022-03-24 RX ORDER — SUMATRIPTAN 50 MG/1
TABLET, FILM COATED ORAL
COMMUNITY
Start: 2022-01-25 | End: 2022-03-24 | Stop reason: DRUGHIGH

## 2022-03-24 RX ORDER — SUMATRIPTAN 100 MG/1
100 TABLET, FILM COATED ORAL
Qty: 10 TABLET | Refills: 0 | Status: SHIPPED | OUTPATIENT
Start: 2022-03-24 | End: 2022-03-24

## 2022-03-24 NOTE — PATIENT INSTRUCTIONS
ÇáÕÏÇÚ: ÅÑÔÇÏÇÊ ÇáÑÚÇíÉ  Headache: Care Instructions  ÅÑÔÇÏÇÊ ÇáÑÚÇíÉ    ááÕÏÇÚ ÇáÚÏíÏ ãä ÇáÃÓÈÇÈ NNEYROSU. áÇ ÊÚÏ ãÚÙã ÍÇáÇÊ ÇáÕÏÇÚ ÚáÇãÉ Úáì ãÔßáÉ ÃßËÑ ÎØæÑÉ æÊÊÍÓä ÈãÝÑÏåÇ Ïæä ÊÏÎá. ÞÏ íÓÇÚÏß ÇáÚáÇÌ ÇáãäÒáí Úáì ÇáÔÚæÑ [id number] ÈÔßá ÃÓÑÚ. áÞÏ ÝÍÕß ÇáØÈíÈ ÝÍÕðÇ ÏÞíÞðÇ¡ æáßä ÞÏ ÊÊØæÑ ÇáÃÚÑÇÖ áÇÍÞðÇ. ÝÅÐÇ ãÇ áÇÍÙÊ ÃíÉ HSUVDJ Ãæ ÃÚÑÇÖ ÌÏíÏÉ¡ ÝÇØáÈ OZMCGM ÇáØÈí ÝæÑðÇ. Åä ãÊÇÈÚÉ ÇáÑÚÇíÉ ÌÒÁ ÑÆíÓí Ýí ÚáÇÌß æÓáÇãÊß. ÝÇÍÑÕ Úáì ÅÌÑÇÁ ÊÑÊíÈÇÊ ÌãíÚ ãæÇÚíÏ ÒíÇÑÉ ÇáØÈíÈ æÇáÊÒã ÈÊáß ÇáãæÇÚíÏ æÇÊÕá ÈØÈíÈß ÅÐÇ ßÇäÊ áÏíß ãÔßáÇÊ. æãä ÇáÃÝßÇÑ ÇáÌíÏÉ ÃíÖðÇ ãÚÑÝÉ äÊÇÆÌ [id number] PMZORIVSP ÈÞÇÆãÉ LSBPLNGF ÇáÊí FEAFWNAM. ßíÝ ÊÚÊäí ÈäÝÓß Ýí MUESSC¿   áÇ ÊÞõÏ ÇáÓíÇÑÉ ÅÐÇ ßäÊ ÞÏ ÊäÇæáÊ ÏæÇÁð ãä ÃÏæíÉ ÊÓßíä ÇáÃáã ÇáÊí ÊõÕÑÝ ÈæÕÝÉ ØÈíÉ.  ÇÓÊÑÍ Ýí ÛÑÝÉ åÇÏÆÉ æãäÎÝÖÉ ÇáÅÖÇÁÉ Åáì Ãä íÐåÈ ÇáÕÏÇÚ. ÃÛáÞ Úíäíß æÍÇæá ÇáÇÓÊÑÎÇÁ Ãæ ÇÎáÏ ááäæã. áÇ ÊÞÑÃ Ãæ ÊÔÇåÏ ÇáÊáÝÇÒ.  ÖÚ ÞØÚÉ ÞãÇÔ ÈÇÑÏÉ æÑØÈÉ Ãæ ßãÇÏÉ Úáì ãäØÞÉ ÇáÃáã áãÏÉ ãä 10 ÏÞÇÆÞ Åáì 20 ÏÞíÞÉ Ýí ÇáãÑÉ ÇáæÇÍÏÉ. ÖÚ ÞØÚÉ ÞãÇÔ ÑÞíÞÉ Èíä ÇáßãÇÏÉ æÌáÏß.  ÇÓÊÎÏã ãäÔÝÉ ÏÇÝÆÉ æÑÇØÈÉ Ãæ æÓÇÏÉ ÍÑÇÑíÉ Úáì ÇáæÖÚ ÇáãäÎÝÖ áÅÑÎÇÁ ÚÖáÇÊ ÇáßÊÝ æÇáÑÞÈÉ ÇáãÔÏæÏÉ.  ÇÌÚá ÃÍÏ ÇáÃÔÎÇÕ íÞæã ÈÊÏáíß ÑÞÈÊß æßÊÝíß ÈÑÝÞ.  ÊäÇæá ÃÏæíÉ ÊÓßíä ÇáÃáã ÈÏÞÉ æÝÞ ÅÑÔÇÏÇÊ ÇáØÈíÈ. o ÅÐÇ ÃÚØÇß ÇáØÈíÈ ÏæÇÁð ãä ÇáÃÏæíÉ ÇáÊí ÊõÕÑÝ ÈæÕÝÉ ØÈíÉ áÚáÇÌ ÇáÃáã¡ ZALFTLR æÝÞ ÅÑÔÇÏÇÊå. o ÅÐÇ áã ÊÃÎÐ ÏæÇÁð áÚáÇÌ ÇáÃáã ãä ÇáÃÏæíÉ ÇáÊí ÊõÕÑÝ ÈæÕÝÉ ØÈíÉ¡ ÝÓá ØÈíÈß ÅÐÇ ßÇä ÈÅãßÇäß ÃÎÐ ÏæÇÁ ãä ÇáÃÏæíÉ ÇáÊí ÊõÕÑÝ ÈÏæä æÕÝÉ ØÈíÉ.  ÊæÎøó ÇáÍÐÑ æáÇ ÊÃÎÐ ÃÏæíÉ ÊÎÝíÝ ÇáÃáã ÈãÇ íÒíÏ ÚãÇ ÊÓãÍ Èå UOENYXYTN¡ ÍíË ÞÏ íÊÝÇÞã ÇáÕÏÇÚ Ãæ íÊßÑÑ ÚäÏãÇ íÞá ÊÃËíÑ ÇáÏæÇÁ ÊÏÑíÌíðÇ.  áÇ ÊÊÌÇåá ÇáÃÚÑÇÖ ÇáÌÏíÏÉ ÇáÊí ÊÍÏË ãÚ ÇáÕÏÇÚ ãËá ÇáÍãì Ãæ ÇáÖÚÝ Ãæ ÇáÊäãíá Ãæ ÊÛíÑÇÊ ÇáÑÄíÉ Ãæ ÇáÇÑÊÈÇß. ÍíË ÅäåÇ ÞÏ ÊãËá ÚáÇãÇÊ áãÔßáÉ ÃßËÑ ÎØæÑÉ.  ááæÞÇíÉ ãä ÇáÕÏÇÚ   ÇÍÊÝÙ ÈíæãíÉ ááÕÏÇÚ ÈÍíË íãßäß ÊÍÏíÏ ãËíÑÇÊ ÇáÕÏÇÚ. ÞÏ íÓÇÚÏß ÊÌäÈ ÇáãËíÑÇÊ Ýí ÇáæÞÇíÉ ãä ÇáÕÏÇÚ. ÓÌøöá æÞÊ ÈÏÇíÉ ßá ÕÏÇÚ æãÏÉ [id number] æÇáÔßá ÇáÐí Úáíå (äÇÈÖ Ãæ ãÄáã Ãæ æÎÒí Ãæ ÛíÑ ÍÇÏ).  ÓÌøöá ÃíÉ ÃÚÑÇÖ ÃÎÑì ÊÕÇÍÈ ÇáÕÏÇÚ ãËá ÇáÛËíÇä Ãæ ÇáÃÖæÇÁ ÇáæÇãÖÉ Ãæ ÇáÈÞÚ ÇáÓæÏÇÁ Ãæ ÇáÍÓÇÓíÉ ááÖæÁ ÇáÓÇØÚ Ãæ ÇáÖÌíÌ. áÇÍÙí ÅÐÇ ãÇ ßäÊ ÊÕÇÈíä ÈÇáÕÏÇÚ ÚäÏãÇ íÞÊÑÈ ãæÚÏ ÏæÑÊß ÇáÔåÑíÉ (AMEBPIQ ááäÓÇÁ). ÓÌøöá ÇáÃÔíÇÁ ÇáÊí ÞÏ ÊËíÑ ÇáÕÏÇÚ ãËá ÇáÃØÚãÉ Ãæ ÇáÑæÇÆÍ ÇáãÚíäÉ ([id number] æÇáÌÈä æÇáäÈíÐ) Ãæ ÇáÏÎÇä Ãæ ÇáÅÖÇÁÉ ÇáÓÇØÚÉ Ãæ ÇáÖÛØ Ãæ ÞáÉ Çáäæã.  ÇßÊÔÝ ÇáØÑÞ ÇáÕÍíÉ ááÊÚÇãá ãÚ ÇáÖÛæØ. íÍÏË ÇáÕÏÇÚ ÈÔßá ÔÇÆÚ ÃËäÇÁ ÇáÃæÞÇÊ ÇáÚÕíÈÉ Ãæ ÈÚÏåÇ ãÈÇÔÑÉ. ÎÐ æÞÊß ááÇÓÊÑÎÇÁ ÞÈá æÈÚÏ Ãä ÊÞæã ÈÝÚá ÔíÁ ÞÏ ÓÈÈ áß ÕÏÇÚðÇ Ýí ÇáãÇÖí.  ÍÇæá ÇáÍÝÇÙ Úáì ÇÓÊÑÎÇÁ ÚÖáÇÊß ÈÇáÍÝÇÙ Úáì æÖÚíÉ ÌíÏÉ ááÌÓã. ÇÝÍÕ ÚÖáÇÊ ÇáÝß æÇáæÌå æÇáÑÞÈÉ æÇáßÊÝ áãÚÑÝÉ ãÇ ÅÐÇ ßÇä ÈåÇ ÔÏ æÍÇæá ÅÑÎÇÁåÇ. ÚäÏ ÇáÌáæÓ Úáì ãßÊÈ¡ ÛíøöÑ ÚÇÏÉó æÖÚíÇÊ ÌÓãß ÈÔßá ãÊßÑÑ æÞã ÈÅÌÑÇÁ ÊãÑíä [id number] áãÏÉ 30 ËÇäíÉ ßá ÓÇÚÉ.  ÇÍÕá Úáì ÞÓØ æÇÝÑ ãä Çáäæã æÇáÊãÇÑíä.  ÊäÇæá ÇáØÚÇã ÈÇäÊÙÇã æÈÔßá ÌíÏ. íãßä Ãä ÊÍÝÒ ÇáÝÊÑÇÊ ÇáØæíáÉ ÈÏæä ØÚÇã ÍÏæË ÇáÕÏÇÚ.  ÚÇáÌ äÝÓß ÈÇáÊÏáíß. íÌÏ ÈÚÖ ÇáäÇÓ Ãä ÇáÊÏáíß ÇáãäÊÙã ãÝíÏ ÌÏðÇ Ýí ÊÎÝíÝ ÇáÊæÊÑ.  Þáøöá ãä ÇáßÇÝííä ÈÚÏã ÊäÇæá ßãíÇÊ ßÈíÑÉ ãä ÇáÞåæÉ Ãæ ÇáÔÇí Ãæ ÇáÕæÏÇ. æáßä áÇ ÊÞáÚ Úä ÇáßÇÝííä ÝÌÃÉ¡ áÃä åÐÇ ÃíÖðÇ ÞÏ íÓÈÈ áß ÕÏÇÚðÇ.  Þáøöá ãä ÅÌåÇÏ ÇáÚíä ÇáäÇÊÌ Úä ÃÔÚÉ ÇáßãÈíæÊÑ ÈÇáæãíÖ ÇáãÊßÑÑ æÇáäÙÑ ÈÚíÏðÇ Úä ÔÇÔÉ ÇáßãÈíæÊÑ Èíä ÇáÍíä æÇáÂÎÑ. ÊÃßÏ ãä ÇÑÊÏÇÁ ÇáäÙÇÑÉ ÇáãäÇÓÈÉ æãä Ãä ÚÇÑÖ ÇáÔÇÔÉ ãËÈÊ DYWTIU ÇáãáÇÆã Úáì ÈÚÏ Øæáå ÐÑÇÚ ÊÞÑíÈðÇ.  ÇÈÍË Úä ÇáãÓÇÚÏÉ ÅÐÇ ßäÊ ãÕÇÈðÇ [id number] Ãæ ÇáÞáÞ. ÝÞÏ íßæä ÇáÕÏÇÚ ÇáÐí ÊÚÇäí ãäå ãÑÊÈØðÇ ÈåÐå FZCLOCW. íãßä Ãä íÄÏí KWDNZI Åáì ßáò ãä ÇáæÞÇíÉ ãä ÇáÕÏÇÚ æÇáãÓÇÚÏÉ Ýí ÇáÊÎáÕ ãä ÃÚÑÇÖ ÇáÞáÞ Ãæ ÇáÇßÊÆÇÈ. ãÊì íÌÈ DLXWEIS áØáÈ ÇáãÓÇÚÏÉ¿  ÇÊÕá ÈÇáÑÞã 911 Ýí Ãí æÞÊ ÊÑì Ýíå ÍÇÌÊß Åáì ÑÚÇíÉ ØÇÑÆÉ. ÝãËáÇð¡ ÇÊÕá Ýí ÍÇáÉ:   ÙåÑÊ Úáíß ÚáÇãÇÊ ÇáÓßÊÉ ÇáÏãÇÛíÉ. æÊÔãá åÐå ÇáÂËÇÑ ÇáÌÇäÈíÉ ãÇ íáí:   o ÊäãíáÇð Ãæ ÔááÇð Ãæ ÖÚÝðÇ ãÝÇÌÆóÇ Ýí ÇáæÌå Ãæ ÇáÐÑÇÚ Ãæ ÇáÓÇÞ ÎÇÕÉ Úáì ÌÇäÈ æÇÍÏ ÝÞØ ãä ÌÓãß. o ÊÛíÑÇÊ ãÝÇÌÆÉ Ýí ÇáÑÄíÉ. o ãÔßáÇÊ ãÝÇÌÆÉ Ýí ÇáÊÍÏË. o LPHVAMLZ Ãæ ÇÖØÑÇÈðÇ ãÝÇÌÆ Ýí Ýåã DHOUG ÇáÈÓíØÉ.   o ãÔßáÇÊ ãÝÇÌÆÉ Ýí ÇáãÔí Ãæ ÇáÊæÇÒä.  o ÕÏÇÚðÇ ãÝÇÌÆðÇ æÍÇÏðÇ íÎÊáÝ Úä ÃÔßÇá ÇáÕÏÇÚ ÇáÓÇÈÞÉ. ÇÊÕá ÈØÈíÈß ÇáÂä Ãæ ÇÈÍË Úä ÇáÑÚÇíÉ ÇáØÈíÉ ÇáÝæÑíÉ Ýí NXAKHGW ÇáÊÇáíÉ:   ÅÕÇÈÊß ÈäæÈÉ ÕÏÇÚ ÌÏíÏÉ Ãæ ÊÝÇÞã ÍÏÉ ÇáÕÏÇÚ.  ÊÝÇÞã ÇáÕÏÇÚ. Ãíä íãßäß ãÚÑÝÉ ÇáãÒíÏ¿  ÇäÊÞÇá Åáì   [email or web address]  ÃÏÎá M271 Ýí ãÑÈÚ ÇáÈÍË áãÚÑÝÉ ÇáãÒíÏ Íæá \"ÇáÕÏÇÚ: ÅÑÔÇÏÇÊ ÇáÑÚÇíÉ. \"  ÓÇÑò [id number] ãä: 13 ßÇäæä ÇáÃæá 2021               äÓÎÉ ÇáãÍÊæì: 13.2  © 9057-0284 Healthwise, [employer]. Êã ÊÚÏíá ÅÑÔÇÏÇÊ ÇáÑÚÇíÉ ÈãæÌÈ ÊÑÎíÕ ÕÇÏÑ ãä ÇÎÊÕÇÕí ÇáÑÚÇíÉ ÇáÕÍíÉ ÇáÎÇÕ Èß. ÅÐÇ ßÇäÊ áÏíß ÃÓÆáÉ WZKVL ÈÍÇáÉ ãÑÖíÉ Ãæ ÈåÐå ÇáÊÚáíãÇÊ¡ ÝÇÍÑÕ Úáì ÇáÑÌæÚ ÏÇÆãðÇ Åáì ÇÎÊÕÇÕí ÇáÑÚÇíÉ ÇáÕÍíÉ. ÊõÎáí ÔÑßÉ [facility] [id number] Úä Ãí ÖãÇä Ãæ ÇáÊÒÇã íÊÚáÞ CABXRXIXC áåÐå AVJRDRQIY. Nutrition During Pregnancy: Care Instructions  Overview     Healthy eating when you are pregnant is important for you and your baby. It can help you feel well and have a successful pregnancy and delivery. During pregnancy your nutrition needs increase. Even if you have excellent eating habits, your doctor may recommend a multivitamin to make sure you get enough iron and folic acid. You may wonder how much weight you should gain. In general, if you were at a healthy weight before you became pregnant, then you should gain between 25 and 35 pounds. If you were overweight before pregnancy, then you'll likely be advised to gain 15 to 25 pounds. If you were underweight before pregnancy, then you'll probably be advised to gain 28 to 40 pounds. Your doctor will work with you to set a weight goal that is right for you. Gaining a healthy amount of weight helps you have a healthy baby. Follow-up care is a key part of your treatment and safety. Be sure to make and go to all appointments, and call your doctor if you are having problems. It's also a good idea to know your test results and keep a list of the medicines you take. How can you care for yourself at home? · Eat plenty of fruits and vegetables.  Include a variety of orange, yellow, and leafy dark-green vegetables every day. · Choose whole-grain bread, cereal, and pasta. Good choices include whole wheat bread, whole wheat pasta, brown rice, and oatmeal.  · Get 4 or more servings of milk and milk products each day. Good choices include nonfat or low-fat milk, yogurt, and cheese. If you cannot eat milk products, you can get calcium from calcium-fortified products such as orange juice, soy milk, and tofu. Other non-milk sources of calcium include leafy green vegetables, such as broccoli, kale, mustard greens, turnip greens, bok saul, and brussels sprouts. · If you eat meat, pick lower-fat types. Good choices include lean cuts of meat and chicken or turkey without the skin. · Do not eat shark, swordfish, antonio mackerel, or tilefish. They have high levels of mercury, which is dangerous to your baby. You can eat up to 12 ounces a week of fish or shellfish that have low mercury levels. Good choices include shrimp, wild salmon, pollock, and catfish. Limit some other types of fish, such as white (albacore) tuna, to 4 oz (0.1 kg) a week. · Heat lunch meats (such as turkey, ham, or bologna) to 165°F before you eat them. This reduces your risk of getting sick from a kind of bacteria that can be found in lunch meats. · Do not eat unpasteurized soft cheeses, such as brie, feta, fresh mozzarella, and blue cheese. They have a bacteria that could harm your baby. · Limit caffeine. If you drink coffee or tea, have no more than 1 cup a day. Caffeine is also found in queenie. · Do not drink any alcohol. No amount of alcohol has been found to be safe during pregnancy. · Do not diet or try to lose weight. For example, do not follow a low-carbohydrate diet. If you are overweight at the start of your pregnancy, your doctor will work with you to manage your weight gain. · Tell your doctor about all vitamins and supplements you take. When should you call for help?   Watch closely for changes in your health, and be sure to contact your doctor if you have any problems. Where can you learn more? Go to http://www.gray.com/  Enter Y785 in the search box to learn more about \"Nutrition During Pregnancy: Care Instructions. \"  Current as of: September 8, 2021               Content Version: 13.2  © 2006-2022 [employer]. Care instructions adapted under license by [employer] (which disclaims liability or warranty for this information). If you have questions about a medical condition or this instruction, always ask your healthcare professional. [address] any warranty or liability for your use of this information. Weeks 6 to 10 of Your Pregnancy: Care Instructions  Overview     During the first 6 to 10 weeks of your pregnancy, your body goes through many changes. Your baby grows very quickly, even though you can't feel it yet. You may start to feel different, both in your body and your emotions. Because each pregnancy is unique, there's no right way to feel. You may feel the healthiest you've ever been, or you might feel tired or sick to your stomach (\"morning sickness\"). These early weeks are a time to make healthy choices and to eat the best foods for you and your baby. This is also a good time to think about birth defects testing. These are tests done during pregnancy to look for possible problems with the baby. First-trimester tests for birth defects can be done between 8 and 13 weeks of pregnancy, depending on the test. Talk with your doctor about what kinds of tests are available. Follow-up care is a key part of your treatment and safety. Be sure to make and go to all appointments, and call your doctor if you are having problems. It's also a good idea to know your test results and keep a list of the medicines you take. How can you care for yourself at home?   Eat well  · Eat at least 3 meals and 2 healthy snacks every day. Eat fresh, whole foods, including:  ? 7 or more servings of bread, tortillas, cereal, rice, pasta, or oatmeal.  ? 3 or more servings of vegetables, especially leafy green vegetables. ? 2 or more servings of fruits. ? 3 or more servings of milk, yogurt, or cheese. ? 2 or more servings of meat, turkey, chicken, fish, eggs, or dried beans. · Drink plenty of fluids, especially water. Avoid sodas and other sweetened drinks. · Choose foods that have important vitamins for your baby, such as calcium, iron, and folate. ? Dairy products, tofu, canned fish with bones, almonds, broccoli, dark leafy greens, corn tortillas, and fortified orange juice are good sources of calcium. ? Beef, poultry, liver, spinach, lentils, dried beans, fortified cereals, and dried fruits are rich in iron. ? Dark leafy greens, broccoli, asparagus, liver, fortified cereals, orange juice, peanuts, and almonds are good sources of folate. · Avoid foods that could harm your baby. ? Do not eat raw or undercooked meat, chicken, or fish (such as sushi or raw oysters). ? Do not eat raw eggs or foods that contain raw eggs, such as Caesar dressing. ? Do not eat soft cheeses and unpasteurized dairy foods, such as Brie, feta, or blue cheese. ? Do not eat fish that contains a lot of mercury, such as shark, swordfish, tilefish, or antonio mackerel. Limit some other types of fish, such as white (albacore) tuna, to 4 oz (0.1 kg) a week. ? Do not eat raw sprouts, especially alfalfa sprouts. ? Cut down on caffeine, such as coffee, tea, and cola. Protect yourself and your baby  · Do not touch jeanna litter or cat feces. They can cause an infection that could harm your baby. · Avoid things that can make your body too hot and may be harmful to your baby, such as a hot tub or sauna. Or talk with your doctor before doing anything that raises your body temperature. Your doctor can tell you if it's safe.   [place] with morning sickness  · Sip small amounts of water, juices, or shakes. Try drinking between meals, not with meals. · Eat 5 or 6 small meals a day. Try dry toast or crackers when you first get up, and eat breakfast a little later. · Avoid spicy, greasy, and fatty foods. · When you feel sick, open your windows or go for a short walk to get fresh air. · Try nausea wristbands. These help some people. · Tell your doctor if you think your prenatal vitamins make you sick. Where can you learn more? Go to http://www.gray.com/  Enter G112 in the search box to learn more about \"Weeks 6 to 10 of Your Pregnancy: Care Instructions. \"  Current as of: June 16, 2021               Content Version: 13.2  © 3754-7403 [employer], [employer]. Care instructions adapted under license by [employer] (which disclaims liability or warranty for this information). If you have questions about a medical condition or this instruction, always ask your healthcare professional. [address] any warranty or liability for your use of this information.

## 2022-03-24 NOTE — PROGRESS NOTES
RM 8   #[id number]    Chief Complaint   Patient presents with   [address]     pt is pregnant dispite taking birth control     Headache     since she was 10years old. 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 migraine can last for 4 days        Visit Vitals  /78   Pulse 91   Temp 98.6 °F (37 °C)   Ht 5' 1\" (1.549 m)   Wt 150 lb (68 kg)   SpO2 99%   BMI 28.34 kg/m²       No flowsheet data found. 1. Have you been to the ER, urgent care clinic since your last visit? Hospitalized since your last visit? Pt first for HA. Given sumatriptan  1/25/22    2. Have you seen or consulted any other health care providers outside of the [address] [place] [address] since your last visit? Include any pap smears or colon screening. FERNANDO louise at [address] Maintenance Due   Topic Date Due    Hepatitis C Screening  Never done    Depression Screen  Never done    COVID-19 Vaccine (1) Never done    Pap Smear  Never done    Flu Vaccine (1) 09/01/2021       No flowsheet data found. AVS  education, follow up, and recommendations provided and addressed with patient.   services used to advise patient - yes Viral infection

## 2022-03-24 NOTE — PROGRESS NOTES
Kristina Clark is a 36 y.o. female presents to establish care. Luxembourgish speaking language line  [place], ID# [id number] Adil facilitates  Last PCP Dr Chapman   HPI     She is ~ 6 weeks pregnant   She has appointment with OB/gyn     Mild nausea     She was seen  in ED 3/10 for lower back pain; Pregnancy confirmed   Low back pain resolved with Acetaminophen     Headache right side of head. 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 Was told Imitrex safe to take during pregnancy   Headaches associated with vomiting nausea and blurred vision   Needs to be in quiet dark room when she has headache     Hx of post partum depression  did not continue medication nor saw PCP after she lost health insurance after last pregnancy      She requests letter to immigrations services  to excuse her from citizenship exam. She is unable to study for test d/t headache disorder     Psychosocial Hx     3 children 8 9years old   Get depressed when she has  headache   Not working   [address] since [facility]     Past Medical History:   Diagnosis Date    Headache        No Known Allergies     Past Surgical History:   Procedure Laterality Date    HX  SECTION      HX GYN           Current Outpatient Medications   Medication Sig    verapamiL (CALAN) 120 mg tablet Take 1 Tablet by mouth two (2) times a day.  prenatal vitamin (Prenatal) 28 mg iron- 800 mcg tab tablet Take 1 Tablet by mouth daily.  ondansetron (Zofran ODT) 4 mg disintegrating tablet 1 Tablet by SubLINGual route every eight (8) hours as needed for Nausea or Vomiting.  ibuprofen (MOTRIN) 600 mg tablet Take 1 Tab by mouth every six (6) hours as needed for Pain.  (Patient not taking: Reported on 3/24/2022)    PNV No12-Iron-FA-DSS-OM-3 29 mg iron-1 mg -50 mg CPKD Take  by mouth. (Patient not taking: Reported on 3/24/2022)     No current facility-administered medications for this visit. Past Surgical History:   Procedure Laterality Date    HX  SECTION      HX GYN             Family History   Problem Relation Age of Onset    Diabetes Mother     Hypertension Mother     Diabetes Brother     Diabetes Brother              Review of Systems   Constitutional: Positive for malaise/fatigue. HENT: Negative. Eyes: Positive for blurred vision and photophobia. Respiratory: Negative. Cardiovascular: Negative. Gastrointestinal: Positive for nausea. Genitourinary: Negative. Musculoskeletal: Negative. Skin: Negative. Neurological: Positive for headaches. Negative for dizziness. Psychiatric/Behavioral: The patient is nervous/anxious and has insomnia. Objective  Visit Vitals  /78   Pulse 91   Temp 98.6 °F (37 °C)   Ht 5' 1\" (1.549 m)   Wt 150 lb (68 kg)   SpO2 99%   BMI 28.34 kg/m²     Physical Exam  Constitutional:       General: She is not in acute distress. Appearance: Normal appearance. She is not ill-appearing. HENT:      Head: Normocephalic and atraumatic. Right Ear: Tympanic membrane, ear canal and external ear normal. There is no impacted cerumen. Left Ear: Tympanic membrane, ear canal and external ear normal. There is no impacted cerumen. Mouth/Throat:      Pharynx: No oropharyngeal exudate or posterior oropharyngeal erythema. Eyes:      Conjunctiva/sclera: Conjunctivae normal.   Cardiovascular:      Rate and Rhythm: Normal rate and regular rhythm. Pulses: Normal pulses. Heart sounds: Normal heart sounds. Pulmonary:      Effort: Pulmonary effort is normal.      Breath sounds: Normal breath sounds. Abdominal:      General: Bowel sounds are normal. There is no distension. Palpations: Abdomen is soft. There is no mass. Tenderness:  There is no abdominal tenderness. There is no right CVA tenderness or left CVA tenderness. Musculoskeletal:      Cervical back: Normal range of motion and neck supple. Lymphadenopathy:      Cervical: No cervical adenopathy. Neurological:      General: No focal deficit present. Mental Status: She is alert. Cranial Nerves: No cranial nerve deficit. Gait: Gait normal.   Psychiatric:         Attention and Perception: Attention normal.         Mood and Affect: Mood is anxious. Speech: Speech normal.         Behavior: Behavior is cooperative. Thought Content: Thought content normal.          Assessment & Plan    ICD-10-CM ICD-9-CM    1. Headache disorder  R51.9 784.0 verapamiL (CALAN) 120 mg tablet      SUMAtriptan (IMITREX) 100 mg tablet   2. Less than 8 weeks gestation of pregnancy  Z3A.01 V22.2    3. Hypokalemia  K62.9 099.5 METABOLIC PANEL, BASIC      MAGNESIUM      MAGNESIUM      METABOLIC PANEL, BASIC   4. Physical exam, annual  Z00.00 V70.0 LIPID PANEL      LIPID PANEL     Diagnoses and all orders for this visit:    1. Headache disorder  -     verapamiL (CALAN) 120 mg tablet; Take 1 Tablet by mouth two (2) times a day. -     SUMAtriptan (IMITREX) 100 mg tablet; Take 1 Tablet by mouth once as needed for Migraine for up to 1 dose. Take every two hours; maximum 2 tablets in 24 hours    2. Less than 8 weeks gestation of pregnancy    3. Hypokalemia  -     METABOLIC PANEL, BASIC; Future  -     MAGNESIUM; Future    4. Physical exam, annual  -     LIPID PANEL; Future    Other orders  -     METABOLIC PANEL, BASIC  -     LIPID PANEL  -     MAGNESIUM      Follow-up and Dispositions    · Return if symptoms worsen or fail to improve. Encouraged to take PNV, keep appointment with OB/gyn provider, avoid use of NSAIDs during pregnancy         Headache c/w migraine type. Reviewed management plan.   Encouraged adequate hydration, rest         lab results and schedule of future lab studies reviewed with patient  reviewed diet, exercise and weight control  reviewed medications and side effects in detail    Patient voices understanding and acceptance of this advice and will call back if any further questions or concerns.   James Weissr, NP

## 2022-03-25 LAB
BUN SERPL-MCNC: 7 MG/DL (ref 6–20)
BUN/CREAT SERPL: 17 (ref 9–23)
CALCIUM SERPL-MCNC: 9.1 MG/DL (ref 8.7–10.2)
CHLORIDE SERPL-SCNC: 102 MMOL/L (ref 96–106)
CHOLEST SERPL-MCNC: 208 MG/DL (ref 100–199)
CO2 SERPL-SCNC: 20 MMOL/L (ref 20–29)
CREAT SERPL-MCNC: 0.42 MG/DL (ref 0.57–1)
GLUCOSE SERPL-MCNC: 76 MG/DL (ref 65–99)
HDLC SERPL-MCNC: 42 MG/DL
LDLC SERPL CALC-MCNC: 130 MG/DL (ref 0–99)
MAGNESIUM SERPL-MCNC: 1.9 MG/DL (ref 1.6–2.3)
POTASSIUM SERPL-SCNC: 4 MMOL/L (ref 3.5–5.2)
SODIUM SERPL-SCNC: 138 MMOL/L (ref 134–144)
TRIGL SERPL-MCNC: 200 MG/DL (ref 0–149)
VLDLC SERPL CALC-MCNC: 36 MG/DL (ref 5–40)

## 2022-04-04 ENCOUNTER — OFFICE VISIT (OUTPATIENT)
Dept: INTERNAL MEDICINE CLINIC | Age: 32
End: 2022-04-04
Payer: MEDICAID

## 2022-04-04 VITALS
TEMPERATURE: 98.7 F | RESPIRATION RATE: 16 BRPM | HEART RATE: 99 BPM | DIASTOLIC BLOOD PRESSURE: 83 MMHG | SYSTOLIC BLOOD PRESSURE: 126 MMHG | HEIGHT: 61 IN | WEIGHT: 153 LBS | OXYGEN SATURATION: 99 % | BODY MASS INDEX: 28.89 KG/M2

## 2022-04-04 DIAGNOSIS — R51.9 HEADACHE DISORDER: Primary | ICD-10-CM

## 2022-04-04 PROCEDURE — 99213 OFFICE O/P EST LOW 20 MIN: CPT | Performed by: NURSE PRACTITIONER

## 2022-04-04 RX ORDER — SUMATRIPTAN 100 MG/1
TABLET, FILM COATED ORAL
COMMUNITY
Start: 2022-03-24 | End: 2022-04-19

## 2022-04-04 NOTE — PATIENT INSTRUCTIONS
ÇáÕÏÇÚ: ÅÑÔÇÏÇÊ ÇáÑÚÇíÉ  Headache: Care Instructions  ÅÑÔÇÏÇÊ ÇáÑÚÇíÉ    ááÕÏÇÚ ÇáÚÏíÏ ãä ÇáÃÓÈÇÈ ZSKFPRVO. áÇ ÊÚÏ ãÚÙã ÍÇáÇÊ ÇáÕÏÇÚ ÚáÇãÉ Úáì ãÔßáÉ ÃßËÑ ÎØæÑÉ æÊÊÍÓä ÈãÝÑÏåÇ Ïæä ÊÏÎá. ÞÏ íÓÇÚÏß ÇáÚáÇÌ ÇáãäÒáí Úáì ÇáÔÚæÑ LWVZFFE ÈÔßá ÃÓÑÚ. áÞÏ ÝÍÕß ÇáØÈíÈ ÝÍÕðÇ ÏÞíÞðÇ¡ æáßä ÞÏ ÊÊØæÑ ÇáÃÚÑÇÖ áÇÍÞðÇ. ÝÅÐÇ ãÇ áÇÍÙÊ ÃíÉ SESWVO Ãæ ÃÚÑÇÖ ÌÏíÏÉ¡ ÝÇØáÈ KDDUMS ÇáØÈí ÝæÑðÇ. Åä ãÊÇÈÚÉ ÇáÑÚÇíÉ ÌÒÁ ÑÆíÓí Ýí ÚáÇÌß æÓáÇãÊß. ÝÇÍÑÕ Úáì ÅÌÑÇÁ ÊÑÊíÈÇÊ ÌãíÚ ãæÇÚíÏ ÒíÇÑÉ ÇáØÈíÈ æÇáÊÒã ÈÊáß ÇáãæÇÚíÏ æÇÊÕá ÈØÈíÈß ÅÐÇ ßÇäÊ áÏíß ãÔßáÇÊ. æãä ÇáÃÝßÇÑ ÇáÌíÏÉ ÃíÖðÇ ãÚÑÝÉ äÊÇÆÌ VEOWMTVI ZAPIYRXKN ÈÞÇÆãÉ [id number] ÇáÊí MCBSSKLM. ßíÝ ÊÚÊäí ÈäÝÓß Ýí SNCJAC¿   áÇ ÊÞõÏ ÇáÓíÇÑÉ ÅÐÇ ßäÊ ÞÏ ÊäÇæáÊ ÏæÇÁð ãä ÃÏæíÉ ÊÓßíä ÇáÃáã ÇáÊí ÊõÕÑÝ ÈæÕÝÉ ØÈíÉ.  ÇÓÊÑÍ Ýí ÛÑÝÉ åÇÏÆÉ æãäÎÝÖÉ ÇáÅÖÇÁÉ Åáì Ãä íÐåÈ ÇáÕÏÇÚ. ÃÛáÞ Úíäíß æÍÇæá ÇáÇÓÊÑÎÇÁ Ãæ ÇÎáÏ ááäæã. áÇ ÊÞÑÃ Ãæ ÊÔÇåÏ ÇáÊáÝÇÒ.  ÖÚ ÞØÚÉ ÞãÇÔ ÈÇÑÏÉ æÑØÈÉ Ãæ ßãÇÏÉ Úáì ãäØÞÉ ÇáÃáã áãÏÉ ãä 10 ÏÞÇÆÞ Åáì 20 ÏÞíÞÉ Ýí ÇáãÑÉ ÇáæÇÍÏÉ. ÖÚ ÞØÚÉ ÞãÇÔ ÑÞíÞÉ Èíä ÇáßãÇÏÉ æÌáÏß.  ÇÓÊÎÏã ãäÔÝÉ ÏÇÝÆÉ æÑÇØÈÉ Ãæ æÓÇÏÉ ÍÑÇÑíÉ Úáì ÇáæÖÚ ÇáãäÎÝÖ áÅÑÎÇÁ ÚÖáÇÊ ÇáßÊÝ æÇáÑÞÈÉ ÇáãÔÏæÏÉ.  ÇÌÚá ÃÍÏ ÇáÃÔÎÇÕ íÞæã ÈÊÏáíß ÑÞÈÊß æßÊÝíß ÈÑÝÞ.  ÊäÇæá ÃÏæíÉ ÊÓßíä ÇáÃáã ÈÏÞÉ æÝÞ ÅÑÔÇÏÇÊ ÇáØÈíÈ. o ÅÐÇ ÃÚØÇß ÇáØÈíÈ ÏæÇÁð ãä ÇáÃÏæíÉ ÇáÊí ÊõÕÑÝ ÈæÕÝÉ ØÈíÉ áÚáÇÌ ÇáÃáã¡ [id number] æÝÞ ÅÑÔÇÏÇÊå. o ÅÐÇ áã ÊÃÎÐ ÏæÇÁð áÚáÇÌ ÇáÃáã ãä ÇáÃÏæíÉ ÇáÊí ÊõÕÑÝ ÈæÕÝÉ ØÈíÉ¡ ÝÓá ØÈíÈß ÅÐÇ ßÇä ÈÅãßÇäß ÃÎÐ ÏæÇÁ ãä ÇáÃÏæíÉ ÇáÊí ÊõÕÑÝ ÈÏæä æÕÝÉ ØÈíÉ.  ÊæÎøó ÇáÍÐÑ æáÇ ÊÃÎÐ ÃÏæíÉ ÊÎÝíÝ ÇáÃáã ÈãÇ íÒíÏ ÚãÇ ÊÓãÍ Èå BVLKLWBZH¡ ÍíË ÞÏ íÊÝÇÞã ÇáÕÏÇÚ Ãæ íÊßÑÑ ÚäÏãÇ íÞá ÊÃËíÑ ÇáÏæÇÁ ÊÏÑíÌíðÇ.  áÇ ÊÊÌÇåá ÇáÃÚÑÇÖ ÇáÌÏíÏÉ ÇáÊí ÊÍÏË ãÚ ÇáÕÏÇÚ ãËá ÇáÍãì Ãæ ÇáÖÚÝ Ãæ ÇáÊäãíá Ãæ ÊÛíÑÇÊ ÇáÑÄíÉ Ãæ ÇáÇÑÊÈÇß. ÍíË ÅäåÇ ÞÏ ÊãËá ÚáÇãÇÊ áãÔßáÉ ÃßËÑ ÎØæÑÉ.  ááæÞÇíÉ ãä ÇáÕÏÇÚ   ÇÍÊÝÙ ÈíæãíÉ ááÕÏÇÚ ÈÍíË íãßäß ÊÍÏíÏ ãËíÑÇÊ ÇáÕÏÇÚ. ÞÏ íÓÇÚÏß ÊÌäÈ ÇáãËíÑÇÊ Ýí ÇáæÞÇíÉ ãä ÇáÕÏÇÚ. ÓÌøöá æÞÊ ÈÏÇíÉ ßá ÕÏÇÚ æãÏÉ [id number] æÇáÔßá ÇáÐí Úáíå (äÇÈÖ Ãæ ãÄáã Ãæ æÎÒí Ãæ ÛíÑ ÍÇÏ).  ÓÌøöá ÃíÉ ÃÚÑÇÖ ÃÎÑì ÊÕÇÍÈ ÇáÕÏÇÚ ãËá ÇáÛËíÇä Ãæ ÇáÃÖæÇÁ ÇáæÇãÖÉ Ãæ ÇáÈÞÚ ÇáÓæÏÇÁ Ãæ ÇáÍÓÇÓíÉ ááÖæÁ ÇáÓÇØÚ Ãæ ÇáÖÌíÌ. áÇÍÙí ÅÐÇ ãÇ ßäÊ ÊÕÇÈíä ÈÇáÕÏÇÚ ÚäÏãÇ íÞÊÑÈ ãæÚÏ ÏæÑÊß ÇáÔåÑíÉ ([id number] ááäÓÇÁ). ÓÌøöá ÇáÃÔíÇÁ ÇáÊí ÞÏ ÊËíÑ ÇáÕÏÇÚ ãËá ÇáÃØÚãÉ Ãæ ÇáÑæÇÆÍ ÇáãÚíäÉ ([id number] æÇáÌÈä æÇáäÈíÐ) Ãæ ÇáÏÎÇä Ãæ ÇáÅÖÇÁÉ ÇáÓÇØÚÉ Ãæ ÇáÖÛØ Ãæ ÞáÉ Çáäæã.  ÇßÊÔÝ ÇáØÑÞ ÇáÕÍíÉ ááÊÚÇãá ãÚ ÇáÖÛæØ. íÍÏË ÇáÕÏÇÚ ÈÔßá ÔÇÆÚ ÃËäÇÁ ÇáÃæÞÇÊ ÇáÚÕíÈÉ Ãæ ÈÚÏåÇ ãÈÇÔÑÉ. ÎÐ æÞÊß ááÇÓÊÑÎÇÁ ÞÈá æÈÚÏ Ãä ÊÞæã ÈÝÚá ÔíÁ ÞÏ ÓÈÈ áß ÕÏÇÚðÇ Ýí ÇáãÇÖí.  ÍÇæá ÇáÍÝÇÙ Úáì ÇÓÊÑÎÇÁ ÚÖáÇÊß ÈÇáÍÝÇÙ Úáì æÖÚíÉ ÌíÏÉ ááÌÓã. ÇÝÍÕ ÚÖáÇÊ ÇáÝß æÇáæÌå æÇáÑÞÈÉ æÇáßÊÝ áãÚÑÝÉ ãÇ ÅÐÇ ßÇä ÈåÇ ÔÏ æÍÇæá ÅÑÎÇÁåÇ. ÚäÏ ÇáÌáæÓ Úáì ãßÊÈ¡ ÛíøöÑ ÚÇÏÉó æÖÚíÇÊ ÌÓãß ÈÔßá ãÊßÑÑ æÞã ÈÅÌÑÇÁ ÊãÑíä [id number] áãÏÉ 30 ËÇäíÉ ßá ÓÇÚÉ.  ÇÍÕá Úáì ÞÓØ æÇÝÑ ãä Çáäæã æÇáÊãÇÑíä.  ÊäÇæá ÇáØÚÇã ÈÇäÊÙÇã æÈÔßá ÌíÏ. íãßä Ãä ÊÍÝÒ ÇáÝÊÑÇÊ ÇáØæíáÉ ÈÏæä ØÚÇã ÍÏæË ÇáÕÏÇÚ.  ÚÇáÌ äÝÓß ÈÇáÊÏáíß. íÌÏ ÈÚÖ ÇáäÇÓ Ãä ÇáÊÏáíß ÇáãäÊÙã ãÝíÏ ÌÏðÇ Ýí ÊÎÝíÝ ÇáÊæÊÑ.  Þáøöá ãä ÇáßÇÝííä ÈÚÏã ÊäÇæá ßãíÇÊ ßÈíÑÉ ãä ÇáÞåæÉ Ãæ ÇáÔÇí Ãæ ÇáÕæÏÇ. æáßä áÇ ÊÞáÚ Úä ÇáßÇÝííä ÝÌÃÉ¡ áÃä åÐÇ ÃíÖðÇ ÞÏ íÓÈÈ áß ÕÏÇÚðÇ.  Þáøöá ãä ÅÌåÇÏ ÇáÚíä ÇáäÇÊÌ Úä ÃÔÚÉ ÇáßãÈíæÊÑ ÈÇáæãíÖ ÇáãÊßÑÑ æÇáäÙÑ ÈÚíÏðÇ Úä ÔÇÔÉ ÇáßãÈíæÊÑ Èíä ÇáÍíä æÇáÂÎÑ. ÊÃßÏ ãä ÇÑÊÏÇÁ ÇáäÙÇÑÉ ÇáãäÇÓÈÉ æãä Ãä ÚÇÑÖ ÇáÔÇÔÉ ãËÈÊ [id number] ÇáãáÇÆã Úáì ÈÚÏ Øæáå ÐÑÇÚ ÊÞÑíÈðÇ.  ÇÈÍË Úä ÇáãÓÇÚÏÉ ÅÐÇ ßäÊ ãÕÇÈðÇ [id number] Ãæ ÇáÞáÞ. ÝÞÏ íßæä ÇáÕÏÇÚ ÇáÐí ÊÚÇäí ãäå ãÑÊÈØðÇ ÈåÐå RMUFRAL. íãßä Ãä íÄÏí HLFDYV Åáì ßáò ãä ÇáæÞÇíÉ ãä ÇáÕÏÇÚ æÇáãÓÇÚÏÉ Ýí ÇáÊÎáÕ ãä ÃÚÑÇÖ ÇáÞáÞ Ãæ ÇáÇßÊÆÇÈ. ãÊì íÌÈ [id number] áØáÈ ÇáãÓÇÚÏÉ¿  ÇÊÕá ÈÇáÑÞã 911 Ýí Ãí æÞÊ ÊÑì Ýíå ÍÇÌÊß Åáì ÑÚÇíÉ ØÇÑÆÉ. ÝãËáÇð¡ ÇÊÕá Ýí ÍÇáÉ:   ÙåÑÊ Úáíß ÚáÇãÇÊ ÇáÓßÊÉ ÇáÏãÇÛíÉ. æÊÔãá åÐå ÇáÂËÇÑ ÇáÌÇäÈíÉ ãÇ íáí:   o ÊäãíáÇð Ãæ ÔááÇð Ãæ ÖÚÝðÇ ãÝÇÌÆóÇ Ýí ÇáæÌå Ãæ ÇáÐÑÇÚ Ãæ ÇáÓÇÞ ÎÇÕÉ Úáì ÌÇäÈ æÇÍÏ ÝÞØ ãä ÌÓãß. o ÊÛíÑÇÊ ãÝÇÌÆÉ Ýí ÇáÑÄíÉ. o ãÔßáÇÊ ãÝÇÌÆÉ Ýí ÇáÊÍÏË. o KTEGHDBZ Ãæ ÇÖØÑÇÈðÇ ãÝÇÌÆ Ýí Ýåã OSSMR ÇáÈÓíØÉ.   o ãÔßáÇÊ ãÝÇÌÆÉ Ýí ÇáãÔí Ãæ ÇáÊæÇÒä.  o ÕÏÇÚðÇ ãÝÇÌÆðÇ æÍÇÏðÇ íÎÊáÝ Úä ÃÔßÇá ÇáÕÏÇÚ ÇáÓÇÈÞÉ. ÇÊÕá ÈØÈíÈß ÇáÂä Ãæ ÇÈÍË Úä ÇáÑÚÇíÉ ÇáØÈíÉ ÇáÝæÑíÉ Ýí NHUEIHM ÇáÊÇáíÉ:   ÅÕÇÈÊß ÈäæÈÉ ÕÏÇÚ ÌÏíÏÉ Ãæ ÊÝÇÞã ÍÏÉ ÇáÕÏÇÚ.  ÊÝÇÞã ÇáÕÏÇÚ. Ãíä íãßäß ãÚÑÝÉ ÇáãÒíÏ¿  ÇäÊÞÇá Åáì   [email or web address]  ÃÏÎá M271 Ýí ãÑÈÚ ÇáÈÍË áãÚÑÝÉ ÇáãÒíÏ Íæá \"ÇáÕÏÇÚ: ÅÑÔÇÏÇÊ ÇáÑÚÇíÉ. \"  ÓÇÑò UPMDRFRL ãä: 13 ßÇäæä ÇáÃæá 2021               äÓÎÉ ÇáãÍÊæì: 13.2  © 1957-7193 [employer]. Êã ÊÚÏíá ÅÑÔÇÏÇÊ ÇáÑÚÇíÉ ÈãæÌÈ ÊÑÎíÕ ÕÇÏÑ ãä ÇÎÊÕÇÕí ÇáÑÚÇíÉ ÇáÕÍíÉ ÇáÎÇÕ Èß. ÅÐÇ ßÇäÊ áÏíß ÃÓÆáÉ LRAVR ÈÍÇáÉ ãÑÖíÉ Ãæ ÈåÐå ÇáÊÚáíãÇÊ¡ ÝÇÍÑÕ Úáì ÇáÑÌæÚ ÏÇÆãðÇ Åáì ÇÎÊÕÇÕí ÇáÑÚÇíÉ ÇáÕÍíÉ. ÊõÎáí ÔÑßÉ Healthwise [id number] Úä Ãí ÖãÇä Ãæ ÇáÊÒÇã íÊÚáÞ [id number] áåÐå [id number]. none

## 2022-04-04 NOTE — PROGRESS NOTES
Subjective  Kane Nix is a 36 y.o. female presents for follow up. Language line Vietnamese speaking  Leidy Santa [id number] facilitates      HPI    patient is < 6 weeks pregnant. Pregnancy confirmed in ED on 3/10 when she was seen for lower abdominal pain  ED evaluation negative for UTI  Lower abdominal and lower back pain resolved spontaneously   She is scheduled to seen OB provider on 3/21  She has started PNV and discontinued BCP as advised by ED provider       Chronic headache since in elementary school, was also getting tired then  In  was told imaging showed  \"tumor in vein\" and  vein swollen on right side     Imitrex and Verapamil  prescribed for headache disorder LOV    Headache still the same;  when she takes Imitrex she throws up   She takes Verapamil BID   When she has headache, she cannot open right eye   She is tired. Just want to sit alone in dark room   Cannot sleep 2/2 headache. Pain  feels like hammer hitting head at  right temple  Unable to  study for citizenship  exam 2/2 headache and fatigue   Needs form X-093 completed for medical certification for disability  exemption   She has tried every OTC medication for headache without improvement     Past Medical History:   Diagnosis Date    Headache        Past Surgical History:   Procedure Laterality Date    HX  SECTION      HX GYN             Current Outpatient Medications   Medication Sig    SUMAtriptan (IMITREX) 100 mg tablet TAKE ONE TABLET BY MOUTH ONCE AS NEEDED FOR MIGRAINE FOR UP TO 1 DOSE. TAKE EVERY TWO HOURS; MAXIMUM 2 TABS IN 24 HOURS    verapamiL (CALAN) 120 mg tablet Take 1 Tablet by mouth two (2) times a day.  prenatal vitamin (Prenatal) 28 mg iron- 800 mcg tab tablet Take 1 Tablet by mouth daily.  PNV No12-Iron-FA-DSS-OM-3 29 mg iron-1 mg -50 mg CPKD Take  by mouth. No current facility-administered medications for this visit. She         Review of Systems   Constitutional: Negative. HENT: Negative. Eyes: Positive for blurred vision and pain. Negative for double vision, photophobia, discharge and redness. Respiratory: Negative. Cardiovascular: Negative. Gastrointestinal: Negative. Genitourinary: Negative. Musculoskeletal: Negative. Skin: Negative. Neurological: Positive for headaches. Negative for dizziness. Psychiatric/Behavioral: Negative. Objective  Visit Vitals  /83 (BP 1 Location: Left upper arm, BP Patient Position: Sitting, BP Cuff Size: Adult)   Pulse 99   Temp 98.7 °F (37.1 °C) (Oral)   Resp 16   Ht 5' 1\" (1.549 m)   Wt 153 lb (69.4 kg)   LMP 02/02/2022   SpO2 99%   BMI 28.91 kg/m²     Physical Exam  Constitutional:       Appearance: Normal appearance. Cardiovascular:      Rate and Rhythm: Normal rate and regular rhythm. Pulses: Normal pulses. Heart sounds: Normal heart sounds. Pulmonary:      Effort: Pulmonary effort is normal.      Breath sounds: Normal breath sounds. Neurological:      Mental Status: She is alert. Mental status is at baseline. Psychiatric:         Attention and Perception: Attention normal.         Mood and Affect: Mood is depressed. Speech: Speech normal.         Behavior: Behavior is withdrawn. Thought Content: Thought content normal.        Study Result    Narrative & Impression   EXAM:  CT HEAD WO CONT  Clinical history: Headaches  INDICATION:   Headaches     COMPARISON: 8/23/2016.     CONTRAST:  None.     TECHNIQUE: Unenhanced CT of the head was performed using 5 mm images. Brain and  bone windows were generated. CT dose reduction was achieved through use of a  standardized protocol tailored for this examination and automatic exposure  control for dose modulation.       FINDINGS:  The ventricles and sulci are normal in size, shape and configuration and  midline. There is no significant white matter disease.  There is no intracranial  hemorrhage, extra-axial collection, mass, mass effect or midline shift. The  basilar cisterns are open. No acute infarct is identified. The bone windows  demonstrate no abnormalities. Right maxillary sinus disease.     IMPRESSION  IMPRESSION: No acute intracranial process. Assessment & Plan    ICD-10-CM ICD-9-CM    1. Headache disorder  R51.9 784.0 REFERRAL TO NEUROLOGY     Diagnoses and all orders for this visit:    1. Headache disorder  -     REFERRAL TO NEUROLOGY      Follow-up and Dispositions    · Return in about 6 months (around 10/4/2022) for headache. Headache refractory to current management  Discussed reasons for Neurology referral   lab results and schedule of future lab studies reviewed with patient  radiology results and schedule of future radiology studies reviewed with patient    Patient voices understanding and acceptance of this advice and will call back if any further questions or concerns.   Tamiko Peralta, NP

## 2022-04-04 NOTE — PROGRESS NOTES
Rm 9 Estonian speaking [id number]    Chief Complaint   Patient presents with    Follow-up     Headaches, still happening     GYN visit 3/29/22    1. Have you been to the ER, urgent care clinic since your last visit? Hospitalized since your last visit? No    2. Have you seen or consulted any other health care providers outside of the [address] [place] [address] since your last visit? Include any pap smears or colon screening. No        Health Maintenance Due   Topic Date Due    Hepatitis C Screening  Never done    Depression Screen  Never done    COVID-19 Vaccine (1) Never done    Pap Smear  Never done           3 most recent PHQ Screens 4/4/2022   Little interest or pleasure in doing things Not at all   Feeling down, depressed, irritable, or hopeless Not at all   Total Score PHQ 2 0             An After Visit Summary was printed and given to the patient.

## 2022-04-12 ENCOUNTER — TELEPHONE (OUTPATIENT)
Dept: INTERNAL MEDICINE CLINIC | Age: 32
End: 2022-04-12

## 2022-04-13 NOTE — TELEPHONE ENCOUNTER
Spoke with pt and  using Japanese  # [phone]. They were informed of providers recommendations and voiced understanding. No further actions required at this time.

## 2022-04-19 RX ORDER — SUMATRIPTAN 100 MG/1
TABLET, FILM COATED ORAL
Qty: 10 TABLET | Refills: 0 | Status: SHIPPED | OUTPATIENT
Start: 2022-04-19 | End: 2022-06-20

## 2022-04-20 ENCOUNTER — OFFICE VISIT (OUTPATIENT)
Dept: INTERNAL MEDICINE CLINIC | Age: 32
End: 2022-04-20
Payer: MEDICAID

## 2022-04-20 VITALS
WEIGHT: 151 LBS | HEIGHT: 61 IN | HEART RATE: 98 BPM | OXYGEN SATURATION: 98 % | DIASTOLIC BLOOD PRESSURE: 78 MMHG | TEMPERATURE: 98.1 F | BODY MASS INDEX: 28.51 KG/M2 | RESPIRATION RATE: 18 BRPM | SYSTOLIC BLOOD PRESSURE: 118 MMHG

## 2022-04-20 DIAGNOSIS — G89.29 CHRONIC INTRACTABLE HEADACHE, UNSPECIFIED HEADACHE TYPE: Primary | ICD-10-CM

## 2022-04-20 DIAGNOSIS — R51.9 CHRONIC INTRACTABLE HEADACHE, UNSPECIFIED HEADACHE TYPE: Primary | ICD-10-CM

## 2022-04-20 DIAGNOSIS — F33.2 SEVERE EPISODE OF RECURRENT MAJOR DEPRESSIVE DISORDER, WITHOUT PSYCHOTIC FEATURES (HCC): ICD-10-CM

## 2022-04-20 PROCEDURE — 99214 OFFICE O/P EST MOD 30 MIN: CPT | Performed by: INTERNAL MEDICINE

## 2022-04-20 RX ORDER — PROMETHAZINE HYDROCHLORIDE 25 MG/1
TABLET ORAL
COMMUNITY
End: 2022-08-22

## 2022-04-20 RX ORDER — TOPIRAMATE 25 MG/1
TABLET ORAL
COMMUNITY
End: 2022-06-13

## 2022-04-20 RX ORDER — SERTRALINE HYDROCHLORIDE 25 MG/1
25 TABLET, FILM COATED ORAL DAILY
Qty: 90 TABLET | Refills: 0 | Status: SHIPPED | OUTPATIENT
Start: 2022-04-20 | End: 2022-06-20 | Stop reason: SDUPTHER

## 2022-04-20 NOTE — PROGRESS NOTES
RM 2    Chief Complaint   Patient presents with    Establish Care     new to provider    Headache     wants note for headaches       Visit Vitals  /78 (BP 1 Location: Left upper arm, BP Patient Position: Sitting, BP Cuff Size: Adult)   Pulse 98   Temp 98.1 °F (36.7 °C) (Oral)   Resp 18   Ht 5' 1\" (1.549 m)   Wt 151 lb (68.5 kg)   SpO2 98%   BMI 28.53 kg/m²       3 most recent PHQ Screens 4/4/2022   Little interest or pleasure in doing things Not at all   Feeling down, depressed, irritable, or hopeless Not at all   Total Score PHQ 2 0         1. Have you been to the ER, urgent care clinic since your last visit? Hospitalized since your last visit? No    2. Have you seen or consulted any other health care providers outside of the [address] [place] [address] since your last visit? Include any pap smears or colon screening. No    Health Maintenance Due   Topic Date Due    Hepatitis C Screening  Never done    COVID-19 Vaccine (1) Never done    Pap Smear  Never done       No flowsheet data found. AVS  education, follow up, and recommendations provided and addressed with patient.   services used to advise patient Yes   #[id number]

## 2022-04-20 NOTE — PROGRESS NOTES
A/P:  Maxi Zuñiga is a 36 y.o. female, she presents today for:    1. Chronic intractable headache, unspecified headache type  2. Severe episode of recurrent major depressive disorder, without psychotic features ([address]  -     sertraline (ZOLOFT) 25 mg tablet; Take 1 Tablet by mouth daily. , Normal, Disp-90 Tablet, R-0     Completed form S-251 with patient in office today. Used . Severe headache: agree with need to see neurology - encouraged patient that I am hopeful she won't always have headaches, but with 21 years of headaches - will complete above form. MDD: start low dose sertraline - discussed need to follow-up will be seeing psychiatriy in June. - reviewed importance of building some social connections. Patient has been self isolating for ~ 9 years since she had a disruptive relationship with a family member. Follow-up and Dispositions    · Return in about 2 months (around 6/20/2022) for follow-up mood. Future Appointments   Date Time Provider Debbie Walker   4/20/2022  8:15 AM Jing Hill MD [facility] BS AMB   6/13/2022 11:00 AM Sherin Garrett DO [facility] BS AMB   10/4/2022  9:30 AM Mercy Baldwin NP [facility] BS AMB     HPI    36year old woman recently established with clinic on 3/29/2022. Seen 2 times by my colleague since then for headache and requests for medical documentation that patient is unable to study for citizenship exam. At the last visit was noted to be less than 8 weeks pregnant. 2016 CT head with and without contrast with small right frontal venous angioma - otherwise unremarkable. She presents to have form N-648 completed. This is the first time I am meeting the patient. Has lived in [place] for 9 years.  and moved here after having first child. Has 3 children at home and pregnant today. Has never worked outside of her home. \"Because of headache I am not even to work at home let alone outside\".    Completed vocational school - high school. 11 years of school. She did have to repeat 2 years because of illness during. \"I used to live on pain medicine\". For her headache - she has been seen at emergency room. - has had imaging. Patient understands that her brain abnormality is the cause of her headaches. Patient has been seen at [facility]. Has appointment in June with neurologist - this will be the first time she is meeting with one. Currently taking verapamil 2 times a day. Sumatriptan - takes 2 times a day when she has severe headache. - has relief from headache for 3 hours but gets body aches all over. Headaches occurring 5-6 days per week. Headaches started at age Romain Sole" long before her periods started around age 8. (menstruation started at age 15). She also reports depression is a major cause of disability. - reports she has an appointment with psychiatrist at the end of June. She is not currently on medication to treat depression. Used to take depression medication in [facility]. Has not taken any medication [place] - patient does not have plan any plans to harm herself - however states she has thoughts of death every day. - her  is aware of her pain and advises her to let him do things for him. - she does not have adult friends in the [place]. - states she does not leave her home because of her illness. - states that she is part of a [place] community - attends [employer].      3 most recent PHQ Screens 4/4/2022   Little interest or pleasure in doing things Not at all   Feeling down, depressed, irritable, or hopeless Not at all   Total Score PHQ 2 0     PMH/PSH: reviewed and updated  Sochx/Famhx: reviewed and updated     All: No Known Allergies  Med:   Current Outpatient Medications   Medication Sig    SUMAtriptan (IMITREX) 100 mg tablet TAKE ONE TABLET BY MOUTH ONCE AS NEEDED FOR MIGRAINE FOR UP TO 1 DOSE. TAKE EVERY TWO HOURS; MAXIMUM 2 TABS IN 24 HOURS    verapamiL (CALAN) 120 mg tablet Take 1 Tablet by mouth two (2) times a day.  prenatal vitamin (Prenatal) 28 mg iron- 800 mcg tab tablet Take 1 Tablet by mouth daily.  PNV No12-Iron-FA-DSS-OM-3 29 mg iron-1 mg -50 mg CPKD Take  by mouth. No current facility-administered medications for this visit. ROS pertinent for the following:  Review of Systems   Constitutional: Positive for malaise/fatigue. Negative for chills and fever. HENT: Positive for nosebleeds. Respiratory: Negative for shortness of breath. Cardiovascular: Negative for chest pain and palpitations. Neurological: Positive for headaches. Psychiatric/Behavioral: Positive for depression. Negative for substance abuse. PE:  Blood pressure 118/78, pulse 98, temperature 98.1 °F (36.7 °C), temperature source Oral, resp. rate 18, height 5' 1\" (1.549 m), weight 151 lb (68.5 kg), last menstrual period 02/02/2022, SpO2 98 %, unknown if currently breastfeeding. Body mass index is 28.53 kg/m². Physical Exam  Vitals and nursing note reviewed. Constitutional:       General: She is not in acute distress. Appearance: Normal appearance. She is normal weight. HENT:      Head: Normocephalic and atraumatic. Right Ear: Tympanic membrane normal.      Left Ear: Tympanic membrane normal.      Nose: Nose normal.      Mouth/Throat:      Mouth: Mucous membranes are moist.   Eyes:      Extraocular Movements: Extraocular movements intact. Conjunctiva/sclera: Conjunctivae normal.      Pupils: Pupils are equal, round, and reactive to light. Cardiovascular:      Rate and Rhythm: Normal rate and regular rhythm. Pulses: Normal pulses. Heart sounds: Normal heart sounds. Pulmonary:      Effort: Pulmonary effort is normal.      Breath sounds: Normal breath sounds. Musculoskeletal:         General: Normal range of motion. Cervical back: Normal range of motion and neck supple.    Skin:     General: Skin is warm and dry.      Capillary Refill: Capillary refill takes less than 2 seconds. Neurological:      General: No focal deficit present. Mental Status: She is alert and oriented to person, place, and time. Psychiatric:         Behavior: Behavior normal.         Thought Content: Thought content normal.         Judgment: Judgment normal.      Comments: Flat affect. Not tearful, calm. Labs:   See addendum for interpretation of labs resulting after time of visit. She was given AVS and expressed understanding with the diagnosis and plan as discussed. An electronic signature was used to authenticate this note.   -- Alejandra Duong MD

## 2022-04-22 NOTE — TELEPHONE ENCOUNTER
Please advise patient Medical Certificate for disability exception cannot be completed for several reasons, 1. Headache disorder does not qualify as a physical or developmental disability in this case  2.  Form needs to be completed by a physician

## 2022-06-13 ENCOUNTER — OFFICE VISIT (OUTPATIENT)
Dept: NEUROLOGY | Age: 32
End: 2022-06-13
Payer: MEDICAID

## 2022-06-13 VITALS
SYSTOLIC BLOOD PRESSURE: 108 MMHG | HEART RATE: 97 BPM | WEIGHT: 147 LBS | OXYGEN SATURATION: 99 % | DIASTOLIC BLOOD PRESSURE: 72 MMHG | BODY MASS INDEX: 27.78 KG/M2 | RESPIRATION RATE: 18 BRPM

## 2022-06-13 DIAGNOSIS — Z3A.18 18 WEEKS GESTATION OF PREGNANCY: ICD-10-CM

## 2022-06-13 DIAGNOSIS — G43.709 CHRONIC MIGRAINE W/O AURA, NOT INTRACTABLE, W/O STAT MIGR: Primary | ICD-10-CM

## 2022-06-13 PROCEDURE — 99204 OFFICE O/P NEW MOD 45 MIN: CPT | Performed by: PSYCHIATRY & NEUROLOGY

## 2022-06-13 RX ORDER — LANOLIN ALCOHOL/MO/W.PET/CERES
400 CREAM (GRAM) TOPICAL DAILY
COMMUNITY
Start: 2022-06-01

## 2022-06-13 RX ORDER — RIZATRIPTAN BENZOATE 10 MG/1
10 TABLET, ORALLY DISINTEGRATING ORAL
Qty: 9 TABLET | Refills: 9 | Status: SHIPPED | OUTPATIENT
Start: 2022-06-13 | End: 2022-06-13

## 2022-06-13 RX ORDER — LANOLIN ALCOHOL/MO/W.PET/CERES
400 CREAM (GRAM) TOPICAL
Qty: 90 TABLET | Refills: 2 | Status: SHIPPED | OUTPATIENT
Start: 2022-06-13 | End: 2022-08-22

## 2022-06-13 RX ORDER — PROPRANOLOL HYDROCHLORIDE 80 MG/1
80 CAPSULE, EXTENDED RELEASE ORAL
Qty: 30 CAPSULE | Refills: 8 | Status: SHIPPED | OUTPATIENT
Start: 2022-06-13 | End: 2022-10-25 | Stop reason: SDUPTHER

## 2022-06-13 RX ORDER — TERCONAZOLE 8 MG/G
CREAM VAGINAL
COMMUNITY
Start: 2022-06-02 | End: 2022-06-13

## 2022-06-13 NOTE — PROGRESS NOTES
Chief Complaint   Patient presents with    New Patient     18 weeks pregnant, complain of migraines. Would like to discuss treatment options while pregnant. Referred by: DR Mirian Mata      HPI    33-year-old woman, Korean speaking, here for headaches. Her  is present.  utilized by phone. She is headaches ever since she was about age 8 or 7 that got worse in 2016 now with daily symptoms right-sided pain throbbing that escalates into severe symptoms such that she cannot see clearly and needs to lie down and has nausea vomiting. She is had multiple scans the most recent in March she tells me. She is also approximately 18 weeks pregnant and became pregnant while on birth control. Headaches have remained the same overall despite pregnancy. No unusual numbness or weakness. She is taking sertraline from primary care for depression. OB prescribed topiramate last week but she has not started it yet. She is also on verapamil. She is using sumatriptan frequently for severe breakthrough. Adonis Medx hx: sertraline, sumatriptan, verapamil      Review of Systems   Eyes: Positive for blurred vision. Negative for double vision. Gastrointestinal: Positive for nausea and vomiting. Neurological: Positive for headaches. All other systems reviewed and are negative.       Past Medical History:   Diagnosis Date    Headache      Family History   Problem Relation Age of Onset    Diabetes Mother     Hypertension Mother     Diabetes Brother     Diabetes Brother      Social History     Socioeconomic History    Marital status:      Spouse name: Not on file    Number of children: Not on file    Years of education: Not on file    Highest education level: Not on file   Occupational History    Not on file   Tobacco Use    Smoking status: Never Smoker    Smokeless tobacco: Never Used   Vaping Use    Vaping Use: Never used   Substance and Sexual Activity    Alcohol use: No    Drug use: No    Sexual activity: Yes     Partners: Male     Birth control/protection: Pill   Other Topics Concern     Service Not Asked    Blood Transfusions Not Asked    Caffeine Concern Not Asked    Occupational Exposure Not Asked    Hobby Hazards Not Asked    Sleep Concern Not Asked    Stress Concern Not Asked    Weight Concern Not Asked    Special Diet Not Asked    Back Care Not Asked    Exercise Not Asked    Bike Helmet Not Asked   [address] Not Asked    Self-Exams Not Asked   Social History Narrative    Not on file     Social Determinants of Health     Financial Resource Strain:     Difficulty of Paying Living Expenses: Not on file   Food Insecurity:     Worried About Running Out of Food in the Last Year: Not on file    Chely of Food in the Last Year: Not on file   Transportation Needs:     Lack of Transportation (Medical): Not on file    Lack of Transportation (Non-Medical): Not on file   Physical Activity:     Days of Exercise per Week: Not on file    Minutes of Exercise per Session: Not on file   Stress:     Feeling of Stress : Not on file   Social Connections:     Frequency of Communication with Friends and Family: Not on file    Frequency of Social Gatherings with Friends and Family: Not on file    Attends [employer] Services: Not on file    Active Member of [address] [place] [address] [employer] or Organizations: Not on file    Attends Club or Organization Meetings: Not on file    Marital Status: Not on file   Intimate Partner Violence:     Fear of Current or Ex-Partner: Not on file    Emotionally Abused: Not on file    Physically Abused: Not on file    Sexually Abused: Not on file   Housing Stability:     Unable to Pay for Housing in the Last Year: Not on file    Number of [place] in the Last Year: Not on file    Unstable Housing in the Last Year: Not on file     Current Outpatient Medications   Medication Sig    magnesium oxide (MAG-OX) 400 mg tablet Take 400 mg by mouth daily.     magnesium oxide (MAG-OX) 400 mg tablet Take 1 Tablet by mouth nightly.  propranolol LA (INDERAL LA) 80 mg SR capsule Take 1 Capsule by mouth nightly.  rizatriptan (MAXALT-MLT) 10 mg disintegrating tablet Take 1 Tablet by mouth once as needed for Migraine for up to 1 dose. Max 3 days a week    promethazine (PHENERGAN) 25 mg tablet 1/2 -1 tablet as needed    SUMAtriptan (IMITREX) 100 mg tablet TAKE ONE TABLET BY MOUTH ONCE AS NEEDED FOR MIGRAINE FOR UP TO 1 DOSE. TAKE EVERY TWO HOURS; MAXIMUM 2 TABS IN 24 HOURS    prenatal vitamin (Prenatal) 28 mg iron- 800 mcg tab tablet Take 1 Tablet by mouth daily.  terconazole (TERAZOL 3) 0.8 % vaginal cream INSERT 1 APPLICATORFUL VAGINALLY ONCE DAILY AT BEDTIME FOR 3 DAYS (Patient not taking: Reported on 6/13/2022)    sertraline (ZOLOFT) 25 mg tablet Take 1 Tablet by mouth daily. (Patient not taking: Reported on 6/13/2022)    PNV No12-Iron-FA-DSS-OM-3 29 mg iron-1 mg -50 mg CPKD Take  by mouth. (Patient not taking: Reported on 6/13/2022)     No current facility-administered medications for this visit. No Known Allergies      Neurologic Exam     Mental Status   Oriented to person, place, and time. Cranial Nerves   Cranial nerves II through XII intact. Motor Exam WALTERS SYMM     Sensory Exam   Light touch normal.     Gait, Coordination, and Reflexes     Gait  Gait: normal    Physical Exam  Vitals and nursing note reviewed. Constitutional:       Appearance: Normal appearance. She is normal weight. Neurological:      Mental Status: She is alert and oriented to person, place, and time. Gait: Gait is intact.        Visit Vitals  /72   Pulse 97   Resp 18   Wt 147 lb (66.7 kg)   LMP 02/02/2022   SpO2 99%   BMI 27.78 kg/m²       Lab Results   Component Value Date/Time    WBC 6.8 03/10/2022 08:03 PM    HGB 12.0 03/10/2022 08:03 PM    HCT 34.9 (L) 03/10/2022 08:03 PM    PLATELET 539 14/31/2126 08:03 PM    MCV 80.8 03/10/2022 08:03 PM     Lab Results Component Value Date/Time    Glucose 76 03/24/2022 12:00 AM    LDL, calculated 130 (H) 03/24/2022 12:00 AM    LDL, calculated 137.2 (H) 06/14/2016 03:56 PM    Creatinine 0.42 (L) 03/24/2022 12:00 AM      Lab Results   Component Value Date/Time    Cholesterol, total 208 (H) 03/24/2022 12:00 AM    HDL Cholesterol 42 03/24/2022 12:00 AM    LDL, calculated 130 (H) 03/24/2022 12:00 AM    LDL, calculated 137.2 (H) 06/14/2016 03:56 PM    Triglyceride 200 (H) 03/24/2022 12:00 AM    CHOL/HDL Ratio 4.2 06/14/2016 03:56 PM     Lab Results   Component Value Date/Time    ALT (SGPT) 32 03/10/2022 08:03 PM    Alk. phosphatase 69 03/10/2022 08:03 PM    Bilirubin, total 0.1 (L) 03/10/2022 08:03 PM    Albumin 3.4 (L) 03/10/2022 08:03 PM    Protein, total 8.6 (H) 03/10/2022 08:03 PM    INR 1.1 11/11/2017 10:31 PM    Prothrombin time 10.6 11/11/2017 10:31 PM    PLATELET 187 76/15/9739 08:03 PM        CT Results (maximum last 3): Results from [place] encounter on 10/18/21    CTA CHEST W OR W WO CONT    Narrative  EXAM: CT ANGIOGRAPHY CHEST    INDICATION:  chest pain and shortness of breath x 3 months (off and on)    COMPARISON: None. Katy Rued CONTRAST: 100 mL of Isovue-370. TECHNIQUE:  Precontrast  images were obtained to localize the volume for acquisition. Multislice helical CT arteriography was performed from the diaphragm to the  thoracic inlet during uneventful rapid bolus intravenous contrast  administration. Lung and soft tissue windows were generated. Coronal and  sagittal  reformatted images were also generated. 3D post processing consisting  of coronal maximum intensity projection images was performed. CT dose reduction  was achieved through use of a standardized protocol tailored for this  examination and automatic exposure control for dose modulation. FINDINGS:  The lungs are clear of mass, nodule, airspace disease or edema.     Prominent lymph nodes in the axillae bilaterally, the largest on the right 14 mm  in short axis diameter and the largest on the left 12 mm in short axis diameter. The pulmonary arteries are well enhanced and no pulmonary emboli are identified. There is no mediastinal or hilar adenopathy or mass. Normal and indeterminate  sized mediastinal and hilar lymph nodes. Residual thymic tissue probably within  normal limits for age. The aorta enhances normally without evidence of aneurysm  or dissection. The visualized portions of the upper abdominal organs show upper normal spleen  size. Impression  1. No CT evidence for central pulmonary embolus at this time . 2. Prominent lymph nodes in the axillae bilaterally. Correlate clinically. CT HEAD WO CONT    Narrative  CLINICAL HISTORY: HA x 3 months  INDICATION: HA x 3 months  COMPARISON: 2017. CT dose reduction was achieved through use of a standardized protocol tailored  for this examination and automatic exposure control for dose modulation. TECHNIQUE: Serial axial images with a collimation of 5 mm were obtained from the  skull base through the vertex  FINDINGS:  The sulci and ventricles are within normal limits for patient age. There is no  evidence of an acute infarction, hemorrhage, or mass-effect. There is no  evidence of midline shift or hydrocephalus. Posterior fossa structures are  unremarkable. No extra-axial collections are seen. Mastoid air cells are well pneumatized and clear. There is no evidence of depressed skull fractures of soft tissue swelling. Impression  No acute intracranial process. MRI Results (maximum last 3): No results found for this or any previous visit. PET Results (maximum last 3): No results found for this or any previous visit. Assessment and Plan   Diagnoses and all orders for this visit:    1. Chronic migraine w/o aura, not intractable, w/o stat migr  -     propranolol LA (INDERAL LA) 80 mg SR capsule; Take 1 Capsule by mouth nightly.   -     rizatriptan (MAXALT-MLT) 10 mg disintegrating tablet; Take 1 Tablet by mouth once as needed for Migraine for up to 1 dose. Max 3 days a week    2. 18 weeks gestation of pregnancy    Other orders  -     magnesium oxide (MAG-OX) 400 mg tablet; Take 1 Tablet by mouth nightly. 33-year-old woman who has daily migrainous symptoms with frequent severe escalations consistent with chronic migraine. She is 18 weeks pregnant which makes it difficult to employ the usual highly effective medications at this time. I am going to ask her to hold off on topiramate for now. We are going to try propranolol at bedtime. Stop verapamil as it does not seem to be working. Add magnesium nightly. A trial of Maxalt acutely. Stay on prenatals. Stay on sertraline as prescribed by her primary care. I like her to come back in about 3 months. We can revisit topiramate at that time. I discussed with her that I do not anticipate elimination of migraines but hopefully we can reduce the intensity and/or frequency. We did stroke education such that if she has any new unusual symptoms she needs to come to the emergency room to be evaluated. She expressed understanding. I reviewed and decided to continue the current medications. This clinical note was dictated with an electronic dictation software that can make unintentional errors. If there are any questions, please contact me directly for clarification. A notice of this visit/encounter being completed has been sent electronically to the patient's PCP and/or referring provider.      2 [facility], [facility] [address]  Diplomate KRYSTIN

## 2022-06-20 ENCOUNTER — OFFICE VISIT (OUTPATIENT)
Dept: INTERNAL MEDICINE CLINIC | Age: 32
End: 2022-06-20
Payer: MEDICAID

## 2022-06-20 VITALS
HEART RATE: 73 BPM | WEIGHT: 147 LBS | SYSTOLIC BLOOD PRESSURE: 96 MMHG | BODY MASS INDEX: 27.75 KG/M2 | TEMPERATURE: 98.1 F | OXYGEN SATURATION: 98 % | DIASTOLIC BLOOD PRESSURE: 64 MMHG | RESPIRATION RATE: 16 BRPM | HEIGHT: 61 IN

## 2022-06-20 DIAGNOSIS — G43.709 CHRONIC MIGRAINE W/O AURA W/O STATUS MIGRAINOSUS, NOT INTRACTABLE: ICD-10-CM

## 2022-06-20 DIAGNOSIS — F33.2 SEVERE EPISODE OF RECURRENT MAJOR DEPRESSIVE DISORDER, WITHOUT PSYCHOTIC FEATURES (HCC): Primary | ICD-10-CM

## 2022-06-20 DIAGNOSIS — Z33.1 PREGNANT STATE, INCIDENTAL: ICD-10-CM

## 2022-06-20 PROCEDURE — 99214 OFFICE O/P EST MOD 30 MIN: CPT | Performed by: INTERNAL MEDICINE

## 2022-06-20 RX ORDER — RIZATRIPTAN BENZOATE 10 MG/1
TABLET, ORALLY DISINTEGRATING ORAL
COMMUNITY
Start: 2022-06-14

## 2022-06-20 RX ORDER — SERTRALINE HYDROCHLORIDE 50 MG/1
50 TABLET, FILM COATED ORAL DAILY
Qty: 90 TABLET | Refills: 1 | Status: SHIPPED | OUTPATIENT
Start: 2022-06-20 | End: 2022-08-22

## 2022-06-20 NOTE — PROGRESS NOTES
A/P:  Helena Mims is a 36 y.o. female, she presents today for:    1. Severe episode of recurrent major depressive disorder, without psychotic features ([address]  2. Chronic migraine w/o aura w/o status migrainosus, not intractable  3. Pregnant state, incidental    36year old woman,currently 19 weeks pregnant - following with gynecology - continue PNV     Migraine headahcs - Severe headache: starting around age 8.  - recently established with neurology - Tami 6/2022 - appreciate recommendations (patient to stop verapamil, trial propranolol and prn Maxalt)     MDD: start low dose sertraline  April 2022. PHQ9 given in Maori today and patient continues to have very high symptom score - she remains socially isolated. - Depression is impacted by chronic pain due to headaches as well as a sense of homesickness. - patient continues to have very high symptoms. - she expresses interest     Future Appointments   Date Time Provider Debbie Walker   6/20/2022  8:45 AM Betty Aleman MD CP BS AMB   10/4/2022  9:30 AM Waldemar Cho NP CPIM BS AMB   10/25/2022  8:00 AM Sherin Garrett, DO [facility] BS AMB     HPI    36year old woman, presents for follow-up of Depression. She is 19 weeks pregnant - became pregnant on birth control. She is also affected by longstanding migraines. Has not noticed much difference in symptoms of mood. Reports difficulty sleeping. - takes medication at night     Reviewed med list in detail. Has started propranolol.  Has not yet started magnesium  Provided Maori translation of PHQ    3 most recent PHQ Screens 6/20/2022   Little interest or pleasure in doing things Nearly every day   Feeling down, depressed, irritable, or hopeless Nearly every day   Total Score PHQ 2 6   Trouble falling or staying asleep, or sleeping too much Nearly every day   Feeling tired or having little energy Nearly every day   Poor appetite, weight loss, or overeating Nearly every day   Feeling bad about yourself - or that you are a failure or have let yourself or your family down Nearly every day   Trouble concentrating on things such as school, work, reading, or watching TV Nearly every day   Moving or speaking so slowly that other people could have noticed; or the opposite being so fidgety that others notice More than half the days   Thoughts of being better off dead, or hurting yourself in some way More than half the days   PHQ 9 Score 25   How difficult have these problems made it for you to do your work, take care of your home and get along with others Extremely difficult   Denies any plans to harm herself - states she feels it is important for her to be present for her children. PMH/PSH: reviewed and updated  Sochx/Famhx: reviewed and updated     All: No Known Allergies  Med:   Current Outpatient Medications   Medication Sig    magnesium oxide (MAG-OX) 400 mg tablet Take 400 mg by mouth daily.  magnesium oxide (MAG-OX) 400 mg tablet Take 1 Tablet by mouth nightly.  propranolol LA (INDERAL LA) 80 mg SR capsule Take 1 Capsule by mouth nightly.  promethazine (PHENERGAN) 25 mg tablet 1/2 -1 tablet as needed    sertraline (ZOLOFT) 25 mg tablet Take 1 Tablet by mouth daily. (Patient not taking: Reported on 6/13/2022)    SUMAtriptan (IMITREX) 100 mg tablet TAKE ONE TABLET BY MOUTH ONCE AS NEEDED FOR MIGRAINE FOR UP TO 1 DOSE. TAKE EVERY TWO HOURS; MAXIMUM 2 TABS IN 24 HOURS    prenatal vitamin (Prenatal) 28 mg iron- 800 mcg tab tablet Take 1 Tablet by mouth daily.  PNV No12-Iron-FA-DSS-OM-3 29 mg iron-1 mg -50 mg CPKD Take  by mouth. (Patient not taking: Reported on 6/13/2022)     No current facility-administered medications for this visit. ROS pertinent for the following:  ROS    PE:  Last menstrual period 02/02/2022, unknown if currently breastfeeding. There is no height or weight on file to calculate BMI. Physical Exam  Vitals and nursing note reviewed.    Constitutional: General: She is not in acute distress. HENT:      Head: Normocephalic. Eyes:      Conjunctiva/sclera: Conjunctivae normal.      Pupils: Pupils are equal, round, and reactive to light. Cardiovascular:      Rate and Rhythm: Normal rate and regular rhythm. Pulmonary:      Effort: Pulmonary effort is normal.   Abdominal:      Comments: gravid   Musculoskeletal:      Cervical back: Neck supple. Skin:     Findings: No rash. Neurological:      Mental Status: She is alert and oriented to person, place, and time. Psychiatric:         Thought Content: Thought content normal.      Comments: Flatted affect. Appropriate otherwise, calm. Labs:   See addendum for interpretation of labs resulting after time of visit. No results found for any visits on 06/20/22. She was given AVS and expressed understanding with the diagnosis and plan as discussed. On this date 06/20/2022 I have spent 40 minutes reviewing previous notes, test results and face to face with the patient discussing the diagnosis and importance of compliance with the treatment plan as well as documenting on the day of the visit. An electronic signature was used to authenticate this note.   -- Marilin Solorzano MD

## 2022-06-20 NOTE — PROGRESS NOTES
RM 2    Chief Complaint   Patient presents with    Follow-up     headache     Insomnia       Visit Vitals  BP 96/64 (BP 1 Location: Left upper arm, BP Patient Position: Sitting, BP Cuff Size: Adult)   Pulse 73   Temp 98.1 °F (36.7 °C) (Oral)   Resp 16   Ht 5' 1\" (1.549 m)   Wt 147 lb (66.7 kg)   SpO2 98%   Breastfeeding Unknown   BMI 27.78 kg/m²       3 most recent PHQ Screens 6/20/2022   Little interest or pleasure in doing things Several days   Feeling down, depressed, irritable, or hopeless Several days   Total Score PHQ 2 2   Trouble falling or staying asleep, or sleeping too much -   Feeling tired or having little energy -   Poor appetite, weight loss, or overeating -   Feeling bad about yourself - or that you are a failure or have let yourself or your family down -   Trouble concentrating on things such as school, work, reading, or watching TV -   Moving or speaking so slowly that other people could have noticed; or the opposite being so fidgety that others notice -   Thoughts of being better off dead, or hurting yourself in some way -   PHQ 9 Score -   How difficult have these problems made it for you to do your work, take care of your home and get along with others -         1. Have you been to the ER, urgent care clinic since your last visit? Hospitalized since your last visit? No    2. Have you seen or consulted any other health care providers outside of the [address] [place] [address] since your last visit? Include any pap smears or colon screening. No    Health Maintenance Due   Topic Date Due    Hepatitis C Screening  Never done    COVID-19 Vaccine (1) Never done    Pap Smear  Never done       No flowsheet data found. AVS  education, follow up, and recommendations provided and addressed with patient.  services used to advise patient.  Yes,  # [id number]

## 2022-06-20 NOTE — PATIENT INSTRUCTIONS
1. Ask at pharmacy for magensium oxide. 2. Take sertraline in the morning instead of night - this is to help difficulty with sleeping.    - increase to 50 mg. Psychologist/Therapist/Counselor  A good therapist is: 1) affordable and available. 2) certified and using a standardized practice 3) someone you can develop trust and rapport with (this can take several visits).      Resources to consider:     Clinical Counseling and Consulting [facility]:   [facility] [address], [address] [facility] [place], [address] [facility]  Phone: [phone]    [facility]  [address] [place] [address]  Phone: [phone]    [address] [facility] Counseling  Multiple locations  Phone: [phone]    [place], [address]  Phone: [phone]    [facility]  [address]  Phone: [address] [facility], [address] [place], [address]  [address] [facility]  [address], [address] [facility] [address] [place], [address] [facility]  P: [phone]

## 2022-08-22 ENCOUNTER — OFFICE VISIT (OUTPATIENT)
Dept: INTERNAL MEDICINE CLINIC | Age: 32
End: 2022-08-22
Payer: MEDICAID

## 2022-08-22 VITALS
SYSTOLIC BLOOD PRESSURE: 98 MMHG | DIASTOLIC BLOOD PRESSURE: 62 MMHG | TEMPERATURE: 97.9 F | OXYGEN SATURATION: 98 % | HEIGHT: 61 IN | HEART RATE: 78 BPM | WEIGHT: 153 LBS | RESPIRATION RATE: 16 BRPM | BODY MASS INDEX: 28.89 KG/M2

## 2022-08-22 DIAGNOSIS — Z34.90 PREGNANCY, UNSPECIFIED GESTATIONAL AGE: ICD-10-CM

## 2022-08-22 DIAGNOSIS — F33.2 SEVERE EPISODE OF RECURRENT MAJOR DEPRESSIVE DISORDER, WITHOUT PSYCHOTIC FEATURES (HCC): Primary | ICD-10-CM

## 2022-08-22 DIAGNOSIS — G43.709 CHRONIC MIGRAINE W/O AURA W/O STATUS MIGRAINOSUS, NOT INTRACTABLE: ICD-10-CM

## 2022-08-22 PROCEDURE — 99214 OFFICE O/P EST MOD 30 MIN: CPT | Performed by: INTERNAL MEDICINE

## 2022-08-22 RX ORDER — SERTRALINE HYDROCHLORIDE 100 MG/1
100 TABLET, FILM COATED ORAL DAILY
Qty: 90 TABLET | Refills: 1 | Status: SHIPPED | OUTPATIENT
Start: 2022-08-22 | End: 2022-10-25 | Stop reason: SDUPTHER

## 2022-08-22 RX ORDER — TERCONAZOLE 4 MG/G
CREAM VAGINAL
COMMUNITY
Start: 2022-08-19

## 2022-08-22 NOTE — PROGRESS NOTES
RM 2    Chief Complaint   Patient presents with    Follow-up     Follow-up mood       Visit Vitals  BP 98/62 (BP 1 Location: Left upper arm, BP Patient Position: Sitting, BP Cuff Size: Adult)   Pulse 78   Temp 97.9 °F (36.6 °C) (Oral)   Resp 16   Ht 5' 1\" (1.549 m)   Wt 153 lb (69.4 kg)   SpO2 98%   BMI 28.91 kg/m²       3 most recent PHQ Screens 6/20/2022   Little interest or pleasure in doing things Nearly every day   Feeling down, depressed, irritable, or hopeless Nearly every day   Total Score PHQ 2 6   Trouble falling or staying asleep, or sleeping too much Nearly every day   Feeling tired or having little energy Nearly every day   Poor appetite, weight loss, or overeating Nearly every day   Feeling bad about yourself - or that you are a failure or have let yourself or your family down Nearly every day   Trouble concentrating on things such as school, work, reading, or watching TV Nearly every day   Moving or speaking so slowly that other people could have noticed; or the opposite being so fidgety that others notice More than half the days   Thoughts of being better off dead, or hurting yourself in some way More than half the days   PHQ 9 Score 25   How difficult have these problems made it for you to do your work, take care of your home and get along with others Extremely difficult         1. Have you been to the ER, urgent care clinic since your last visit? Hospitalized since your last visit? No    2. Have you seen or consulted any other health care providers outside of the [address] [place] [address] since your last visit? Include any pap smears or colon screening. No    Health Maintenance Due   Topic Date Due    Hepatitis C Screening  Never done    COVID-19 Vaccine (1) Never done    Pap Smear  Never done       No flowsheet data found. AVS  education, follow up, and recommendations provided and addressed with patient.  services used to advise patient. Yes

## 2022-08-22 NOTE — PATIENT INSTRUCTIONS
See below locations of colleagues in the [employer] group that have availability for establishing care.     -----[facility]-----    [address] Corrian Bonner NP   - Ishmael Mabry NP    [facility]  [address] [facility], [facility]  Tel: [phone] [facility] Internal Medicine (18 and older only)   - Roby Aguila MD   - [facility] NP   - Briseida Camejo NP    [facility]. [address], [address] [facility]  Tel: [phone] [facility],Fourth Floor practice   - Delmar Crawley MD   - Pranav Giang MD - special interest in women's health    [address] [facility], [facility]  Tel: [phone]      -----[facility] and [address]------    [address] center on [address] [facility] MD Gomez - special interest in obesity medicine    [facility] [address] [address]  Tel [phone]    [address]. center at [address] MD    [address] [facility] [address], [address] [facility]  [address] [address]  Tel [phone]    ------[place] and [address] [facility]-----    [address] James Payne MD    [place] [address], [facility]  Tel: [phone]      [facility] Practice - ages 11 and older   - Joey Fernando MD      [address] [place] [address]  Tel: [phone]

## 2022-08-22 NOTE — PROGRESS NOTES
History, exam and education/communication with pt with [facility]  phone. Patient primary language: Slovenian    A/P:  Chastity Lowe is a 36 y.o. female, she presents today for:    1. Severe episode of recurrent major depressive disorder, without psychotic features ([address]  -     sertraline (ZOLOFT) 100 mg tablet; Take 1 Tablet by mouth daily. , Normal, Disp-90 Tablet, R-1Dose change - discontinue 50 mg tablet  2. Chronic migraine w/o aura w/o status migrainosus, not intractable  3. Pregnancy, unspecified gestational age    Pregnancy: continues to do well - follow-up with gynecologist Cleo Cisse - gynecologist). Migraine headahcs - Severe headache: starting around age 8.  - recently established with neurology - Tami 6/2022 - taking propranolol and magnesium - has not had much improvement. - scheduled for follow-up in October - patient is aware and plans to keep appointment. MDD:  - In cotext of pregnancy, social isolation and chronic headache pain. - remains uncontrolled and severe - Hubert PHQ9 25, and today is 25 - both times given in Slovenian    - today reporting new audiotory sounds - possible hallucination - with delayed or missed medicaiton dose - buzzing - resolves with taking sertraline    - due to pregnancy - did not change SSRI type - but will trial an increased dose  - reassured patient that this is not a permanent change. - increase from 50 to 100 mg of sertraline today. - meeting with therapist to start in September. (Nicherelle \"psychiatrist\") - states she was unable to find local therapist - did discuss role of possible      - discussed starting with new primary care - to have ongoing care with Dr. Jc Frederick - but she should plan to establish for care after pregnancy. Called and left message with Dr. Rolf Wei team to notify of my moving out of state and need for close follow-up for mental health.      Future Appointments   Date Time Provider Debbie Walker   10/4/2022 9:30 AM Bailey Jaimes NP [facility] AMB   10/25/2022  8:00 AM Sherin Garrett DO [facility] AMB     HPI    36year old woman with history of severe migraines - headaches, and MDD. She is currently pregnant. She is scheduled to see Ms. Arlie Hashimoto in October. States that she feels tired with the pregnancy. Due date - November 2022    Reports that if she forgets to take medicine (sertraline) she will hear voices. But if she takes medicine it makes the voices go away within 1 hour. Upon further clarification - sounds sound buzzing or humming - like a household appliance. No prior hearing. Patient feels very tired and if physically unwell has thoughts of self harm. PMH/PSH: reviewed and updated  Sochx/Famhx: reviewed and updated     All: No Known Allergies  Med:   Current Outpatient Medications   Medication Sig    rizatriptan (MAXALT-MLT) 10 mg disintegrating tablet     sertraline (ZOLOFT) 50 mg tablet Take 1 Tablet by mouth daily. magnesium oxide (MAG-OX) 400 mg tablet Take 400 mg by mouth daily. propranolol LA (INDERAL LA) 80 mg SR capsule Take 1 Capsule by mouth nightly. prenatal vitamin (Prenatal) 28 mg iron- 800 mcg tab tablet Take 1 Tablet by mouth daily. terconazole (TERAZOL 7) 0.4 % vaginal cream INSERT 1 APPLICATORFUL VAGINALLY ONCE DAILY AT BEDTIME     No current facility-administered medications for this visit. ROS pertinent for the following:  Review of Systems   Constitutional:  Negative for chills, fever and malaise/fatigue. Respiratory:  Negative for shortness of breath. Cardiovascular:  Negative for chest pain. PE:  Blood pressure 98/62, pulse 78, temperature 97.9 °F (36.6 °C), temperature source Oral, resp. rate 16, height 5' 1\" (1.549 m), weight 153 lb (69.4 kg), last menstrual period 02/02/2022, SpO2 98 %, unknown if currently breastfeeding. Body mass index is 28.91 kg/m². Physical Exam  Vitals and nursing note reviewed.    Constitutional:       General: She is not in acute distress. HENT:      Head: Normocephalic. Eyes:      Conjunctiva/sclera: Conjunctivae normal.      Pupils: Pupils are equal, round, and reactive to light. Cardiovascular:      Rate and Rhythm: Normal rate. Pulmonary:      Effort: Pulmonary effort is normal.   Musculoskeletal:      Cervical back: Neck supple. Skin:     Findings: No rash. Neurological:      Mental Status: She is alert and oriented to person, place, and time. Psychiatric:         Mood and Affect: Mood normal.         Behavior: Behavior normal.         Thought Content: Thought content normal.         Judgment: Judgment normal.     Labs:   See addendum for interpretation of labs resulting after time of visit. She was given AVS and expressed understanding with the diagnosis and plan as discussed. On this date 08/22/2022 I have spent 40 minutes reviewing previous notes, test results and face to face with the patient discussing the diagnosis and importance of compliance with the treatment plan as well as documenting on the day of the visit. An electronic signature was used to authenticate this note.   -- Mahsa Nieto MD

## 2022-10-25 ENCOUNTER — OFFICE VISIT (OUTPATIENT)
Dept: NEUROLOGY | Age: 32
End: 2022-10-25
Payer: MEDICAID

## 2022-10-25 VITALS
RESPIRATION RATE: 16 BRPM | BODY MASS INDEX: 30.02 KG/M2 | OXYGEN SATURATION: 98 % | WEIGHT: 159 LBS | SYSTOLIC BLOOD PRESSURE: 118 MMHG | HEIGHT: 61 IN | DIASTOLIC BLOOD PRESSURE: 72 MMHG | HEART RATE: 87 BPM

## 2022-10-25 DIAGNOSIS — F33.2 SEVERE EPISODE OF RECURRENT MAJOR DEPRESSIVE DISORDER, WITHOUT PSYCHOTIC FEATURES (HCC): ICD-10-CM

## 2022-10-25 DIAGNOSIS — G43.709 CHRONIC MIGRAINE W/O AURA, NOT INTRACTABLE, W/O STAT MIGR: Primary | ICD-10-CM

## 2022-10-25 DIAGNOSIS — Z3A.37 37 WEEKS GESTATION OF PREGNANCY: ICD-10-CM

## 2022-10-25 PROCEDURE — 99214 OFFICE O/P EST MOD 30 MIN: CPT | Performed by: PSYCHIATRY & NEUROLOGY

## 2022-10-25 RX ORDER — SERTRALINE HYDROCHLORIDE 100 MG/1
100 TABLET, FILM COATED ORAL DAILY
Qty: 90 TABLET | Refills: 3 | Status: SHIPPED | OUTPATIENT
Start: 2022-10-25

## 2022-10-25 RX ORDER — PROPRANOLOL HYDROCHLORIDE 80 MG/1
80 CAPSULE, EXTENDED RELEASE ORAL
Qty: 90 CAPSULE | Refills: 3 | Status: SHIPPED | OUTPATIENT
Start: 2022-10-25

## 2022-10-25 NOTE — PROGRESS NOTES
Chief Complaint   Patient presents with    Follow-up     Migraines: Patient stated her migraines are getting better.  (Getting 3-4x a week)     Visit Vitals  /72 (BP 1 Location: Left upper arm, BP Patient Position: Sitting)   Pulse 87   Resp 16   Ht 5' 1\" (1.549 m)   Wt 159 lb (72.1 kg)   SpO2 98%   BMI 30.04 kg/m²

## 2022-10-25 NOTE — PROGRESS NOTES
Chief Complaint   Patient presents with    Follow-up     Migraines: Patient stated her migraines are getting better. (Getting 3-4x a week)       HPI    61-year-old woman following up.  utilized by phone. She is still pregnant now 37 weeks due any day. Her migraines still occur but with less frequency maybe 3 to 4 days/week better than daily. She is on sertraline 100 mg and propranolol at bedtime. When she does have a severe migraine she feels completely incapacitated. No loss of vision. She plans to breast-feed. Background:  33-year-old woman, Tajik speaking, here for headaches. Her  is present.  utilized by phone. She is headaches ever since she was about age 8 or 7 that got worse in 2016 now with daily symptoms right-sided pain throbbing that escalates into severe symptoms such that she cannot see clearly and needs to lie down and has nausea vomiting. She is had multiple scans the most recent in March she tells me. She is also approximately 18 weeks pregnant and became pregnant while on birth control. Headaches have remained the same overall despite pregnancy. No unusual numbness or weakness. She is taking sertraline from primary care for depression. OB prescribed topiramate last week but she has not started it yet. She is also on verapamil. She is using sumatriptan frequently for severe breakthrough. [facility] hx: sertraline, sumatriptan, verapamil, propranolol        Review of Systems   Eyes:  Negative for double vision. Neurological:  Positive for headaches. Negative for loss of consciousness. All other systems reviewed and are negative.     Past Medical History:   Diagnosis Date    Headache     Severe episode of recurrent major depressive disorder, without psychotic features ([facility] [address] 6/20/2022     Family History   Problem Relation Age of Onset    Diabetes Mother     Hypertension Mother     Diabetes Brother     Diabetes Brother      Social History     Socioeconomic History    Marital status:      Spouse name: Not on file    Number of children: Not on file    Years of education: Not on file    Highest education level: Not on file   Occupational History    Not on file   Tobacco Use    Smoking status: Never    Smokeless tobacco: Never   Vaping Use    Vaping Use: Never used   Substance and Sexual Activity    Alcohol use: No    Drug use: No    Sexual activity: Yes     Partners: Male     Birth control/protection: Pill   Other Topics Concern     Service Not Asked    Blood Transfusions Not Asked    Caffeine Concern Not Asked    Occupational Exposure Not Asked    Hobby Hazards Not Asked    Sleep Concern Not Asked    Stress Concern Not Asked    Weight Concern Not Asked    Special Diet Not Asked    Back Care Not Asked    Exercise Not Asked    Bike Helmet Not Asked    Seat Belt Not Asked    Self-Exams Not Asked   Social History Narrative    Not on file     Social Determinants of Health     Financial Resource Strain: Not on file   Food Insecurity: Not on file   Transportation Needs: Not on file   Physical Activity: Not on file   Stress: Not on file   Social Connections: Not on file   Intimate Partner Violence: Not on file   Housing Stability: Not on file     No Known Allergies      Current Outpatient Medications   Medication Sig    sertraline (ZOLOFT) 100 mg tablet Take 1 Tablet by mouth daily. rizatriptan (MAXALT-MLT) 10 mg disintegrating tablet     magnesium oxide (MAG-OX) 400 mg tablet Take 400 mg by mouth daily. propranolol LA (INDERAL LA) 80 mg SR capsule Take 1 Capsule by mouth nightly. prenatal vitamin (Prenatal) 28 mg iron- 800 mcg tab tablet Take 1 Tablet by mouth daily. terconazole (TERAZOL 7) 0.4 % vaginal cream INSERT 1 APPLICATORFUL VAGINALLY ONCE DAILY AT BEDTIME (Patient not taking: Reported on 10/25/2022)     No current facility-administered medications for this visit.            Neurologic Exam     Mental Status   WD/WN adult in NAD, normal grooming, gravid  VSS  A&O x 3    PERRL, nonicteric  Face is covered  Speech is  clear  No limb ataxia. No abnl movements. Moving all extemities spontaneously and symmetric  Normal gait       Visit Vitals  /72 (BP 1 Location: Left upper arm, BP Patient Position: Sitting)   Pulse 87   Resp 16   Ht 5' 1\" (1.549 m)   Wt 159 lb (72.1 kg)   LMP 02/02/2022   SpO2 98%   BMI 30.04 kg/m²       Assessment and Plan   Diagnoses and all orders for this visit:    1. Chronic migraine w/o aura, not intractable, w/o stat migr    2. 37 weeks gestation of pregnancy    82-year-old woman currently 37 weeks pregnant who has chronic migraine history still having migraines but a little bit less frequent. Because she is due any day now I am not going to make any changes to her medication. I am going to maintain propranolol and she can maintain sertraline which she has been using for depression. I did refill the medications for her as a courtesy since her other doctor has left her practice. I want her to stay on these medications for now and after she delivers we can revisit trying better medications. We also need to be concerned with breast-feeding status in choosing medications. She understands. She will follow-up in about 4-5 months. 30 minutes of time in total was spent reviewing the medical record today, face-to-face time with the patient utilizing , and time completing documentation today. I reviewed and decided to continue the current medications. This clinical note was dictated with an electronic dictation software that can make unintentional errors. If there are any questions, please contact me directly for clarification.       [address] [facility], [facility] [address]  Diplomate GIANN

## 2022-11-13 ENCOUNTER — APPOINTMENT (OUTPATIENT)
Dept: CT IMAGING | Age: 32
End: 2022-11-13
Attending: NURSE PRACTITIONER
Payer: MEDICAID

## 2022-11-13 ENCOUNTER — HOSPITAL ENCOUNTER (EMERGENCY)
Age: 32
Discharge: HOME | End: 2022-11-14
Attending: EMERGENCY MEDICINE
Payer: MEDICAID

## 2022-11-13 DIAGNOSIS — G43.701 CHRONIC MIGRAINE WITHOUT AURA WITH STATUS MIGRAINOSUS, NOT INTRACTABLE: Primary | ICD-10-CM

## 2022-11-13 LAB
ALBUMIN SERPL-MCNC: 3 G/DL (ref 3.5–5)
ALBUMIN/GLOB SERPL: 0.5 {RATIO} (ref 1.1–2.2)
ALP SERPL-CCNC: 104 U/L (ref 45–117)
ALT SERPL-CCNC: 40 U/L (ref 12–78)
ANION GAP SERPL CALC-SCNC: 6 MMOL/L (ref 5–15)
AST SERPL-CCNC: 38 U/L (ref 15–37)
BASOPHILS # BLD: 0 K/UL (ref 0–0.1)
BASOPHILS NFR BLD: 0 % (ref 0–1)
BILIRUB SERPL-MCNC: 0.4 MG/DL (ref 0.2–1)
BUN SERPL-MCNC: 11 MG/DL (ref 6–20)
BUN/CREAT SERPL: 16 (ref 12–20)
CALCIUM SERPL-MCNC: 8.9 MG/DL (ref 8.5–10.1)
CHLORIDE SERPL-SCNC: 111 MMOL/L (ref 97–108)
CO2 SERPL-SCNC: 27 MMOL/L (ref 21–32)
COMMENT, HOLDF: NORMAL
COMMENT, HOLDF: NORMAL
CREAT SERPL-MCNC: 0.7 MG/DL (ref 0.55–1.02)
DIFFERENTIAL METHOD BLD: ABNORMAL
EOSINOPHIL # BLD: 0 K/UL (ref 0–0.4)
EOSINOPHIL NFR BLD: 0 % (ref 0–7)
ERYTHROCYTE [DISTWIDTH] IN BLOOD BY AUTOMATED COUNT: 14 % (ref 11.5–14.5)
GLOBULIN SER CALC-MCNC: 5.8 G/DL (ref 2–4)
GLUCOSE SERPL-MCNC: 93 MG/DL (ref 65–100)
HCT VFR BLD AUTO: 41.4 % (ref 35–47)
HGB BLD-MCNC: 13.6 G/DL (ref 11.5–16)
IMM GRANULOCYTES # BLD AUTO: 0 K/UL (ref 0–0.04)
IMM GRANULOCYTES NFR BLD AUTO: 1 % (ref 0–0.5)
LYMPHOCYTES # BLD: 1.6 K/UL (ref 0.8–3.5)
LYMPHOCYTES NFR BLD: 32 % (ref 12–49)
MCH RBC QN AUTO: 28 PG (ref 26–34)
MCHC RBC AUTO-ENTMCNC: 32.9 G/DL (ref 30–36.5)
MCV RBC AUTO: 85.2 FL (ref 80–99)
MONOCYTES # BLD: 0.2 K/UL (ref 0–1)
MONOCYTES NFR BLD: 3 % (ref 5–13)
NEUTS SEG # BLD: 3.1 K/UL (ref 1.8–8)
NEUTS SEG NFR BLD: 64 % (ref 32–75)
NRBC # BLD: 0 K/UL (ref 0–0.01)
NRBC BLD-RTO: 0 PER 100 WBC
PLATELET # BLD AUTO: 263 K/UL (ref 150–400)
PMV BLD AUTO: 9.9 FL (ref 8.9–12.9)
POTASSIUM SERPL-SCNC: 4 MMOL/L (ref 3.5–5.1)
PROT SERPL-MCNC: 8.8 G/DL (ref 6.4–8.2)
RBC # BLD AUTO: 4.86 M/UL (ref 3.8–5.2)
SAMPLES BEING HELD,HOLD: NORMAL
SAMPLES BEING HELD,HOLD: NORMAL
SODIUM SERPL-SCNC: 144 MMOL/L (ref 136–145)
WBC # BLD AUTO: 5 K/UL (ref 3.6–11)

## 2022-11-13 PROCEDURE — 96375 TX/PRO/DX INJ NEW DRUG ADDON: CPT

## 2022-11-13 PROCEDURE — 74011250637 HC RX REV CODE- 250/637: Performed by: NURSE PRACTITIONER

## 2022-11-13 PROCEDURE — 70496 CT ANGIOGRAPHY HEAD: CPT

## 2022-11-13 PROCEDURE — 36415 COLL VENOUS BLD VENIPUNCTURE: CPT

## 2022-11-13 PROCEDURE — 96374 THER/PROPH/DIAG INJ IV PUSH: CPT

## 2022-11-13 PROCEDURE — 74011000636 HC RX REV CODE- 636: Performed by: RADIOLOGY

## 2022-11-13 PROCEDURE — 99285 EMERGENCY DEPT VISIT HI MDM: CPT

## 2022-11-13 PROCEDURE — 74011250636 HC RX REV CODE- 250/636: Performed by: NURSE PRACTITIONER

## 2022-11-13 PROCEDURE — 85025 COMPLETE CBC W/AUTO DIFF WBC: CPT

## 2022-11-13 PROCEDURE — 70450 CT HEAD/BRAIN W/O DYE: CPT

## 2022-11-13 PROCEDURE — 80053 COMPREHEN METABOLIC PANEL: CPT

## 2022-11-13 PROCEDURE — 96361 HYDRATE IV INFUSION ADD-ON: CPT

## 2022-11-13 RX ORDER — MECLIZINE HCL 12.5 MG 12.5 MG/1
25 TABLET ORAL
Status: COMPLETED | OUTPATIENT
Start: 2022-11-13 | End: 2022-11-13

## 2022-11-13 RX ORDER — BUTALBITAL, ACETAMINOPHEN AND CAFFEINE 50; 325; 40 MG/1; MG/1; MG/1
2 TABLET ORAL
Status: COMPLETED | OUTPATIENT
Start: 2022-11-13 | End: 2022-11-13

## 2022-11-13 RX ORDER — KETOROLAC TROMETHAMINE 30 MG/ML
30 INJECTION, SOLUTION INTRAMUSCULAR; INTRAVENOUS ONCE
Status: COMPLETED | OUTPATIENT
Start: 2022-11-13 | End: 2022-11-13

## 2022-11-13 RX ORDER — DEXAMETHASONE SODIUM PHOSPHATE 10 MG/ML
10 INJECTION INTRAMUSCULAR; INTRAVENOUS
Status: COMPLETED | OUTPATIENT
Start: 2022-11-13 | End: 2022-11-13

## 2022-11-13 RX ADMIN — BUTALBITAL, ACETAMINOPHEN, AND CAFFEINE 2 TABLET: 50; 325; 40 TABLET ORAL at 23:46

## 2022-11-13 RX ADMIN — KETOROLAC TROMETHAMINE 30 MG: 30 INJECTION, SOLUTION INTRAMUSCULAR; INTRAVENOUS at 22:20

## 2022-11-13 RX ADMIN — MECLIZINE 25 MG: 12.5 TABLET ORAL at 22:20

## 2022-11-13 RX ADMIN — IOPAMIDOL 100 ML: 755 INJECTION, SOLUTION INTRAVENOUS at 21:15

## 2022-11-13 RX ADMIN — DEXAMETHASONE SODIUM PHOSPHATE 10 MG: 10 INJECTION INTRAMUSCULAR; INTRAVENOUS at 23:45

## 2022-11-13 RX ADMIN — SODIUM CHLORIDE 1000 ML: 9 INJECTION, SOLUTION INTRAVENOUS at 22:20

## 2022-11-14 ENCOUNTER — TELEPHONE (OUTPATIENT)
Dept: NEUROLOGY | Age: 32
End: 2022-11-14

## 2022-11-14 VITALS
RESPIRATION RATE: 16 BRPM | TEMPERATURE: 98.1 F | SYSTOLIC BLOOD PRESSURE: 154 MMHG | HEART RATE: 62 BPM | DIASTOLIC BLOOD PRESSURE: 81 MMHG | OXYGEN SATURATION: 96 %

## 2022-11-14 PROCEDURE — 96361 HYDRATE IV INFUSION ADD-ON: CPT

## 2022-11-14 NOTE — TELEPHONE ENCOUNTER
Patient's  called and stated that his wife went to the emergency room for couple of different times for the severe headache that she had and she still in pain , so he asked if doctor Jake Guerrero can prescribe some stronger medication especially after she lost her baby week ago regarding to the umbilical cord was around his neck as the  said.

## 2022-11-14 NOTE — ED TRIAGE NOTES
Patient is coming in for headache that started today at 1000. Patient was seen at [facility] EM[facility]ENC[facility] and was prescribed Maxalt. Patient was instructed to follow up with Neuro but states still has headache. Patient is pregnant.

## 2022-11-14 NOTE — ED PROVIDER NOTES
Melyssa #[id number]    Fabricio Cobb is a 29 y.o. F who presents to the ED today with CC of Headache. States her headache is severe and states that started at 9 AM this morning. It is starting at the top of her head to her forehead and in a throbbing nature, worsened with bright lights and sitting. Associated nausea vomiting and shortness of breath. Last able to keep down water last night. Took rizatriptan at 10 AM with no symptom relief started 9/am this morning. Note patient states she had her baby last week but unfortunately baby was born stillborn due to umbilical cord wrapped around his neck. She is followed by Tru Lino with [facility] neurology-noted to be prescribed Zoloft, rizatriptan ,Mag-Ox, propanolol ,but patient denies taking anything other than rizatriptan today. PCP: Katherine Lim MD    There are no other complaints, changes or physical findings at this time. PMH: migraines   Surgical History: None  Smoking: None  Alcohol: None  Drug Use: None  ALLERGIES: Patient has no known allergies.     Past Medical History:   Diagnosis Date    Headache     Severe episode of recurrent major depressive disorder, without psychotic features ([facility] [address] 2022     Past Surgical History:   Procedure Laterality Date    HX  SECTION      HX GYN           Family History:   Problem Relation Age of Onset    Diabetes Mother     Hypertension Mother     Diabetes Brother     Diabetes Brother      Social History     Socioeconomic History    Marital status:      Spouse name: Not on file    Number of children: Not on file    Years of education: Not on file    Highest education level: Not on file   Occupational History    Not on file   Tobacco Use    Smoking status: Never    Smokeless tobacco: Never   Vaping Use    Vaping Use: Never used   Substance and Sexual Activity    Alcohol use: No    Drug use: No    Sexual activity: Yes     Partners: Male     Birth control/protection: Pill Other Topics Concern     Service Not Asked    Blood Transfusions Not Asked    Caffeine Concern Not Asked    Occupational Exposure Not Asked    Hobby Hazards Not Asked    Sleep Concern Not Asked    Stress Concern Not Asked    Weight Concern Not Asked    Special Diet Not Asked    Back Care Not Asked    Exercise Not Asked    Bike Helmet Not Asked    Seat Belt Not Asked    Self-Exams Not Asked   Social History Narrative    Not on file     Social Determinants of Health     Financial Resource Strain: Not on file   Food Insecurity: Not on file   Transportation Needs: Not on file   Physical Activity: Not on file   Stress: Not on file   Social Connections: Not on file   Intimate Partner Violence: Not on file   Housing Stability: Not on file             Review of Systems   Constitutional:  Negative for fever. HENT:  Negative for congestion. Positive light sensitivity   Eyes:  Negative for discharge. Respiratory:  Negative for shortness of breath. Cardiovascular:  Negative for chest pain. Gastrointestinal:  Positive for nausea and vomiting. Genitourinary:  Negative for dysuria. Musculoskeletal:  Negative for myalgias. Neurological:  Positive for headaches. Negative for tremors, seizures, syncope, speech difficulty, weakness and numbness. Psychiatric/Behavioral:  Negative for behavioral problems. Vitals:    11/13/22 1906 11/14/22 0033   BP: (!) 169/96 (!) 154/81   Pulse: (!) 54 62   Resp: 20 16   Temp: 97.6 °F (36.4 °C) 98.1 °F (36.7 °C)   SpO2: 95% 96%            Physical Exam  Constitutional:       Appearance: Normal appearance. HENT:      Head: Normocephalic and atraumatic. Eyes:      Pupils: Pupils are equal, round, and reactive to light. Cardiovascular:      Rate and Rhythm: Normal rate and regular rhythm. Pulmonary:      Effort: Pulmonary effort is normal.   Abdominal:      General: Abdomen is flat. Musculoskeletal:      Cervical back: Neck supple.    Skin:     General: Skin is dry. Neurological:      Mental Status: She is alert. Mental status is at baseline. Psychiatric:         Mood and Affect: Mood normal.         Behavior: Behavior normal.            LABORATORY RESULTS:  Recent Results (from the past 24 hour(s))   METABOLIC PANEL, COMPREHENSIVE    Collection Time: 11/13/22  9:05 PM   Result Value Ref Range    Sodium 144 136 - 145 mmol/L    Potassium 4.0 3.5 - 5.1 mmol/L    Chloride 111 (H) 97 - 108 mmol/L    CO2 27 21 - 32 mmol/L    Anion gap 6 5 - 15 mmol/L    Glucose 93 65 - 100 mg/dL    BUN 11 6 - 20 MG/DL    Creatinine 0.70 0.55 - 1.02 MG/DL    BUN/Creatinine ratio 16 12 - 20      eGFR >60 >60 ml/min/1.73m2    Calcium 8.9 8.5 - 10.1 MG/DL    Bilirubin, total 0.4 0.2 - 1.0 MG/DL    ALT (SGPT) 40 12 - 78 U/L    AST (SGOT) 38 (H) 15 - 37 U/L    Alk. phosphatase 104 45 - 117 U/L    Protein, total 8.8 (H) 6.4 - 8.2 g/dL    Albumin 3.0 (L) 3.5 - 5.0 g/dL    Globulin 5.8 (H) 2.0 - 4.0 g/dL    A-G Ratio 0.5 (L) 1.1 - 2.2     CBC WITH AUTOMATED DIFF    Collection Time: 11/13/22  9:05 PM   Result Value Ref Range    WBC 5.0 3.6 - 11.0 K/uL    RBC 4.86 3.80 - 5.20 M/uL    HGB 13.6 11.5 - 16.0 g/dL    HCT 41.4 35.0 - 47.0 %    MCV 85.2 80.0 - 99.0 FL    MCH 28.0 26.0 - 34.0 PG    MCHC 32.9 30.0 - 36.5 g/dL    RDW 14.0 11.5 - 14.5 %    PLATELET 222 255 - 984 K/uL    MPV 9.9 8.9 - 12.9 FL    NRBC 0.0 0  WBC    ABSOLUTE NRBC 0.00 0.00 - 0.01 K/uL    NEUTROPHILS 64 32 - 75 %    LYMPHOCYTES 32 12 - 49 %    MONOCYTES 3 (L) 5 - 13 %    EOSINOPHILS 0 0 - 7 %    BASOPHILS 0 0 - 1 %    IMMATURE GRANULOCYTES 1 (H) 0.0 - 0.5 %    ABS. NEUTROPHILS 3.1 1.8 - 8.0 K/UL    ABS. LYMPHOCYTES 1.6 0.8 - 3.5 K/UL    ABS. MONOCYTES 0.2 0.0 - 1.0 K/UL    ABS. EOSINOPHILS 0.0 0.0 - 0.4 K/UL    ABS. BASOPHILS 0.0 0.0 - 0.1 K/UL    ABS. IMM.  GRANS. 0.0 0.00 - 0.04 K/UL    DF AUTOMATED     SAMPLES BEING HELD    Collection Time: 11/13/22  9:05 PM   Result Value Ref Range    SAMPLES BEING HELD 1RED COMMENT        Add-on orders for these samples will be processed based on acceptable specimen integrity and analyte stability, which may vary by analyte. SAMPLES BEING HELD    Collection Time: 11/13/22  9:05 PM   Result Value Ref Range    SAMPLES BEING HELD 1BLUE     COMMENT        Add-on orders for these samples will be processed based on acceptable specimen integrity and analyte stability, which may vary by analyte. IMAGING RESULTS:  CT HEAD WO CONT    Result Date: 11/13/2022  No acute process. CTA HEAD NECK W CONT    Result Date: 11/13/2022  1. Normal CTA head and neck. EKG INTERPRETATION:   See course summary for interpretation if ordered    MEDICATIONS GIVEN:  Medications   ketorolac (TORADOL) injection 30 mg (30 mg IntraVENous Given 11/13/22 2220)   meclizine (ANTIVERT) tablet 25 mg (25 mg Oral Given 11/13/22 2220)   sodium chloride 0.9 % bolus infusion 1,000 mL (0 mL IntraVENous IV Completed 11/14/22 0031)   iopamidoL (ISOVUE-370) 76 % injection 100 mL (100 mL IntraVENous Given 11/13/22 2115)   dexamethasone (PF) (DECADRON) 10 mg/mL injection 10 mg (10 mg IntraVENous Given 11/13/22 2345)   butalbital-acetaminophen-caffeine (FIORICET, ESGIC) -40 mg per tablet 2 Tablet (2 Tablets Oral Given 11/13/22 2346)            MDM  Number of Diagnoses or Management Options  Chronic migraine without aura with status migrainosus, not intractable  Diagnosis management comments: Patient with history of migraine headaches, acutely worsened 1 week postpartum. Patient symptomatology different than usual headache,  CTA head neck to rule out thrombus as patient likely still hypercoagulable due to recent pregnancy unremarkable. Liver enzymes and platelets within normal limits, making delayed HELLP syndrome unlikely. Patient symptomatology improved after IV fluids and migraine cocktail. Given instructions to follow-up with neurologist.  Strict return precautions discussed.            Discussed results and work-up with patient and answered all questions, the patient expresses understanding and agrees with the care plan and disposition. The patient was given an opportunity to ask questions and all concerns raised were addressed prior to discharge. Recommended patient follow-up with provider as listed below. Counseled patient on standard home and self-care measures. Specifically explained the emergent conditions that could arise and clearly instructed the patient to return to the emergency department for those and any other new, worsening, or concerning symptoms. Patient stable and ready for discharge. IMPRESSION:  1.  Chronic migraine without aura with status migrainosus, not intractable        DISPOSITION:  Discharge    PLAN:  Follow-up Information       Follow up With Specialties Details Why Contact Info    Tahira Kent MD Internal Medicine Physician  As needed [place] [phone]      Isabel Squires DO Neurology Schedule an appointment as soon as possible for a visit   [address] [place] [address] [phone]      [address] [facility] [address] [facility] Emergency Medicine  As needed, If symptoms worsen [facility]  [phone]          Discharge Medication List as of 11/14/2022 12:04 AM

## 2022-11-15 ENCOUNTER — HOSPITAL ENCOUNTER (EMERGENCY)
Age: 32
Discharge: HOME | End: 2022-11-15
Attending: EMERGENCY MEDICINE
Payer: MEDICAID

## 2022-11-15 VITALS
HEART RATE: 54 BPM | SYSTOLIC BLOOD PRESSURE: 178 MMHG | OXYGEN SATURATION: 98 % | RESPIRATION RATE: 20 BRPM | DIASTOLIC BLOOD PRESSURE: 98 MMHG | TEMPERATURE: 98.1 F

## 2022-11-15 DIAGNOSIS — M54.2 NECK PAIN: ICD-10-CM

## 2022-11-15 DIAGNOSIS — R51.9 NONINTRACTABLE HEADACHE, UNSPECIFIED CHRONICITY PATTERN, UNSPECIFIED HEADACHE TYPE: Primary | ICD-10-CM

## 2022-11-15 PROCEDURE — 99284 EMERGENCY DEPT VISIT MOD MDM: CPT

## 2022-11-15 PROCEDURE — 96375 TX/PRO/DX INJ NEW DRUG ADDON: CPT

## 2022-11-15 PROCEDURE — 74011250636 HC RX REV CODE- 250/636: Performed by: EMERGENCY MEDICINE

## 2022-11-15 PROCEDURE — 74011000250 HC RX REV CODE- 250: Performed by: EMERGENCY MEDICINE

## 2022-11-15 PROCEDURE — 96365 THER/PROPH/DIAG IV INF INIT: CPT

## 2022-11-15 RX ORDER — ONDANSETRON 4 MG/1
4 TABLET, FILM COATED ORAL
Qty: 15 TABLET | Refills: 0 | Status: SHIPPED | OUTPATIENT
Start: 2022-11-15

## 2022-11-15 RX ORDER — KETOROLAC TROMETHAMINE 30 MG/ML
15 INJECTION, SOLUTION INTRAMUSCULAR; INTRAVENOUS
Status: COMPLETED | OUTPATIENT
Start: 2022-11-15 | End: 2022-11-15

## 2022-11-15 RX ORDER — DEXAMETHASONE SODIUM PHOSPHATE 10 MG/ML
10 INJECTION INTRAMUSCULAR; INTRAVENOUS
Status: COMPLETED | OUTPATIENT
Start: 2022-11-15 | End: 2022-11-15

## 2022-11-15 RX ORDER — PROCHLORPERAZINE EDISYLATE 5 MG/ML
10 INJECTION INTRAMUSCULAR; INTRAVENOUS
Status: COMPLETED | OUTPATIENT
Start: 2022-11-15 | End: 2022-11-15

## 2022-11-15 RX ORDER — METHYLPREDNISOLONE 4 MG/1
TABLET ORAL
Qty: 1 DOSE PACK | Refills: 0 | Status: SHIPPED | OUTPATIENT
Start: 2022-11-15

## 2022-11-15 RX ORDER — DIPHENHYDRAMINE HYDROCHLORIDE 50 MG/ML
12.5 INJECTION, SOLUTION INTRAMUSCULAR; INTRAVENOUS
Status: COMPLETED | OUTPATIENT
Start: 2022-11-15 | End: 2022-11-15

## 2022-11-15 RX ORDER — KETOROLAC TROMETHAMINE 10 MG/1
10 TABLET, FILM COATED ORAL
Qty: 15 TABLET | Refills: 0 | Status: SHIPPED | OUTPATIENT
Start: 2022-11-15

## 2022-11-15 RX ORDER — METHOCARBAMOL 750 MG/1
750 TABLET, FILM COATED ORAL 3 TIMES DAILY
Qty: 15 TABLET | Refills: 0 | Status: SHIPPED | OUTPATIENT
Start: 2022-11-15

## 2022-11-15 RX ADMIN — PROCHLORPERAZINE EDISYLATE 10 MG: 5 INJECTION INTRAMUSCULAR; INTRAVENOUS at 15:30

## 2022-11-15 RX ADMIN — DIPHENHYDRAMINE HYDROCHLORIDE 12.5 MG: 50 INJECTION, SOLUTION INTRAMUSCULAR; INTRAVENOUS at 16:12

## 2022-11-15 RX ADMIN — SODIUM CHLORIDE 20 MG: 9 INJECTION INTRAMUSCULAR; INTRAVENOUS; SUBCUTANEOUS at 16:13

## 2022-11-15 RX ADMIN — DEXAMETHASONE SODIUM PHOSPHATE 10 MG: 10 INJECTION, SOLUTION INTRAMUSCULAR; INTRAVENOUS at 15:30

## 2022-11-15 RX ADMIN — SODIUM CHLORIDE 1000 ML: 9 INJECTION, SOLUTION INTRAVENOUS at 16:11

## 2022-11-15 RX ADMIN — KETOROLAC TROMETHAMINE 15 MG: 30 INJECTION, SOLUTION INTRAMUSCULAR; INTRAVENOUS at 16:13

## 2022-11-15 NOTE — TELEPHONE ENCOUNTER
I am sorry to hear about the news. Please reinforce to her to continue taking sertraline and propranolol like she is supposed to will help the headaches long-term. I reviewed the imaging and everything is okay that was done 2 days ago. No bleed. She still has a severe headache I am going to order a steroid pack and she can start doing that today and finish it while she is still taking the Zoloft and propranolol. Stable

## 2022-11-15 NOTE — ED PROVIDER NOTES
Headache   Associated symptoms include nausea. Pertinent negatives include no fever, no palpitations and no shortness of breath. Patient is a 30-year-old Irish female with past medical history significant for headaches and anxiety/depression who returns to the ED reporting a severe frontal headache. She was evaluated at this facility on   (2022)and had a negative CT of the head without and negative CTA of the neck with contrast.  She felt better for over 24 hours after she left but the headache returned this morning. She was unable to see her PCP so return to the ED for evaluation and medications. Denies fever, cold symptoms, visual changes, focal weakness or rash. Denies any difficulty breathing, difficulty swallowing, SOB or chest pain. Denies any nausea, vomiting or diarrhea. Hand-eye coordination is intact; normal reflexes; normal gait. She has not had any medications today prior to arrival.  Language line employed.          Past Medical History:   Diagnosis Date    Headache     Severe episode of recurrent major depressive disorder, without psychotic features ([facility] [address] 2022       Past Surgical History:   Procedure Laterality Date    HX  SECTION      HX GYN               Family History:   Problem Relation Age of Onset    Diabetes Mother     Hypertension Mother     Diabetes Brother     Diabetes Brother        Social History     Socioeconomic History    Marital status:      Spouse name: Not on file    Number of children: Not on file    Years of education: Not on file    Highest education level: Not on file   Occupational History    Not on file   Tobacco Use    Smoking status: Never    Smokeless tobacco: Never   Vaping Use    Vaping Use: Never used   Substance and Sexual Activity    Alcohol use: No    Drug use: No    Sexual activity: Yes     Partners: Male     Birth control/protection: Pill   Other Topics Concern     Service Not Asked    Blood Transfusions Not Asked Caffeine Concern Not Asked    Occupational Exposure Not Asked    Hobby Hazards Not Asked    Sleep Concern Not Asked    Stress Concern Not Asked    Weight Concern Not Asked    Special Diet Not Asked    Back Care Not Asked    Exercise Not Asked    Bike Helmet Not Asked    Seat Belt Not Asked    Self-Exams Not Asked   Social History Narrative    Not on file     Social Determinants of Health     Financial Resource Strain: Not on file   Food Insecurity: Not on file   Transportation Needs: Not on file   Physical Activity: Not on file   Stress: Not on file   Social Connections: Not on file   Intimate Partner Violence: Not on file   Housing Stability: Not on file         ALLERGIES: Patient has no known allergies. Review of Systems   Constitutional:  Negative for activity change, appetite change, fever and unexpected weight change. HENT:  Negative for congestion, rhinorrhea and sore throat. Eyes:  Negative for visual disturbance. Respiratory:  Negative for cough and shortness of breath. Cardiovascular:  Negative for chest pain, palpitations and leg swelling. Gastrointestinal:  Positive for nausea. Negative for abdominal pain and diarrhea. Genitourinary:  Negative for flank pain. Musculoskeletal:  Positive for neck pain. Negative for arthralgias, gait problem and myalgias. Skin:  Negative for rash. Neurological:  Positive for headaches. All other systems reviewed and are negative. Vitals:    11/15/22 1513   BP: (!) 178/98   Pulse: (!) 54   Resp: 20   Temp: 98.1 °F (36.7 °C)   SpO2: 98%            Physical Exam  Vitals and nursing note reviewed. Constitutional:       General: She is not in acute distress. Appearance: Normal appearance. She is not ill-appearing, toxic-appearing or diaphoretic. Comments: Japanese female; non smoker;    HENT:      Head: Normocephalic.       Right Ear: Tympanic membrane normal.      Left Ear: Tympanic membrane normal.      Nose: Nose normal. Mouth/Throat:      Mouth: Mucous membranes are moist.      Pharynx: No posterior oropharyngeal erythema. Eyes:      Extraocular Movements: Extraocular movements intact. Conjunctiva/sclera: Conjunctivae normal.      Pupils: Pupils are equal, round, and reactive to light. Comments: No nystagmus   Cardiovascular:      Rate and Rhythm: Normal rate and regular rhythm. Heart sounds: No friction rub. Pulmonary:      Effort: Pulmonary effort is normal.      Breath sounds: Normal breath sounds. Musculoskeletal:         General: Normal range of motion. Cervical back: Normal range of motion and neck supple. Tenderness present. Right lower leg: No edema. Left lower leg: No edema. Lymphadenopathy:      Cervical: No cervical adenopathy. Skin:     General: Skin is warm. Findings: No rash. Neurological:      Mental Status: She is alert and oriented to person, place, and time. MDM         Procedures    Patient has been reexamined and is presently headache free. We will give referral to neurology clinic for follow-up.    5:08 PM  Patient's results and plan of care have been reviewed with the pt and her . Patient and/or family have verbally conveyed their understanding and agreement of the patient's signs, symptoms, diagnosis, treatment and prognosis and additionally agree to follow up as recommended or return to the Emergency Room should her condition change prior to follow-up. Discharge instructions have also been provided to the patient with some educational information regarding her diagnosis as well a list of reasons why she would want to return to the ER prior to her follow-up appointment should her condition change.  Casey Hill NP

## 2022-11-15 NOTE — TELEPHONE ENCOUNTER
Called patient's . Informed her that the doctor stated, \"I am sorry to hear about the news. Please reinforce to her to continue taking sertraline and propranolol like she is supposed to will help the headaches long-term. I reviewed the imaging and everything is okay that was done 2 days ago. No bleed. She still has a severe headache I am going to order a steroid pack and she can start doing that today and finish it while she is still taking the Zoloft and propranolol. \" But patient's  stated that she went to the ER due to her migraines today. Looks like they gave her medications, told him to hold on for now and I will notify the doctor.

## 2022-11-15 NOTE — ED TRIAGE NOTES
Pt c/o chronic headache. Pt reports she was seen on Sunday for the same and continues with headache.

## 2022-11-17 NOTE — TELEPHONE ENCOUNTER
Called patient's spouse. He reports she is doing better after the ER visit. Patient wants to hold off on the steriods that were sent to her pharmacy.

## 2023-02-02 NOTE — PROGRESS NOTES
Chief Complaint   Patient presents with    Headache     Patient reports 10/10 pain         HPI      Demetri Curtis is a 29year old female here for a follow up. She is a new patient for me. She was last seen in the office by Dr Claudia Jean on 10/25/22 for migraines. She was 37 weeks pregnant at the time on Sertraline 100 mg and propranolol at bedtime. Since her last visit she ended up loosing the pregnancy and was in the ER on 11/15/22 due to a severe migraine. Her imaging was all negative for anything acute. She was feeling better after the IV medication was given to her. She speaks Welsh and a  is being utilized. Criselda Contreras #[id number])  She is telling me that her migraines are getting worse and her medication is not working anymore. She gets a migraine every week and it lasts a total of 4 days each time (>16 days a month). She is in bed the whole time she has it with photophobia, N&V, blurry vision. She has had a severe migraine for the past 4 days now. She is taking her sertraline 100 mg but she is not sleeping since the loss of her daughter. She denies any double vision or weakness. [facility] [place] sertraline, Topamax- pregnant, sumatriptan, verapamil, propranolol, magnesium, rizatriptan        BACKGROUND  26-year-old woman, Faroese speaking, here for headaches. Her  is present.  utilized by phone. She is headaches ever since she was about age 8 or 7 that got worse in 2016 now with daily symptoms right-sided pain throbbing that escalates into severe symptoms such that she cannot see clearly and needs to lie down and has nausea vomiting. She is had multiple scans the most recent in March she tells me. She is also approximately 18 weeks pregnant and became pregnant while on birth control. Headaches have remained the same overall despite pregnancy. No unusual numbness or weakness. She is taking sertraline from primary care for depression.   OB prescribed topiramate last week but she has not started it yet. She is also on verapamil. She is using sumatriptan frequently for severe breakthrough. Review of Systems   Eyes:  Positive for blurred vision. Negative for double vision. Gastrointestinal:  Positive for nausea and vomiting. Neurological:  Positive for headaches. Negative for loss of consciousness. Psychiatric/Behavioral:  Positive for depression. Negative for suicidal ideas. The patient is nervous/anxious and has insomnia. All other systems reviewed and are negative.     Past Medical History:   Diagnosis Date    Headache     Severe episode of recurrent major depressive disorder, without psychotic features [facility] 6/20/2022     Family History   Problem Relation Age of Onset    Diabetes Mother     Hypertension Mother     Diabetes Brother     Diabetes Brother      Social History     Socioeconomic History    Marital status:      Spouse name: Not on file    Number of children: Not on file    Years of education: Not on file    Highest education level: Not on file   Occupational History    Not on file   Tobacco Use    Smoking status: Never    Smokeless tobacco: Never   Vaping Use    Vaping Use: Never used   Substance and Sexual Activity    Alcohol use: No    Drug use: No    Sexual activity: Yes     Partners: Male     Birth control/protection: Pill   Other Topics Concern     Service Not Asked    Blood Transfusions Not Asked    Caffeine Concern Not Asked    Occupational Exposure Not Asked    Hobby Hazards Not Asked    Sleep Concern Not Asked    Stress Concern Not Asked    Weight Concern Not Asked    Special Diet Not Asked    Back Care Not Asked    Exercise Not Asked    Bike Helmet Not Asked    Seat Belt Not Asked    Self-Exams Not Asked   Social History Narrative    Not on file     Social Determinants of Health     Financial Resource Strain: Not on file   Food Insecurity: Not on file   Transportation Needs: Not on file   Physical Activity: Not on file   Stress: Not on file   Social Connections: Not on file   Intimate Partner Violence: Not on file   Housing Stability: Not on file     No Known Allergies      Current Outpatient Medications   Medication Sig    methylPREDNISolone (MEDROL DOSEPACK) 4 mg tablet Per package directions    amitriptyline (ELAVIL) 50 mg tablet Take 1 Tablet by mouth nightly. ubrogepant Pearla [place]) 100 mg tablet Take 1 Tablet by mouth once as needed for Migraine for up to 1 dose. sertraline (ZOLOFT) 100 mg tablet Take 1 Tablet by mouth daily. terconazole (TERAZOL 7) 0.4 % vaginal cream      No current facility-administered medications for this visit. Neurologic Exam     Mental Status   WD/WN adult in NAD, normal grooming,   VSS  A&O x 3    PERRL, nonicteric  Face is covered  Speech is  clear  No limb ataxia. No abnl movements. Moving all extemities spontaneously and symmetric  Normal gait       Visit Vitals  BP (!) 140/71 (BP 1 Location: Left arm, BP Patient Position: Sitting, BP Cuff Size: Adult)   Pulse 82   Resp 17   Ht 5' 1\" (1.549 m)   Wt 149 lb (67.6 kg)   SpO2 97%   BMI 28.15 kg/m²     Follow-up and Dispositions    Return in about 4 months (around 6/3/2023). Assessment and Plan   Diagnoses and all orders for this visit:    1. Chronic migraine w/o aura, not intractable, w/o stat migr  -     methylPREDNISolone (MEDROL DOSEPACK) 4 mg tablet; Per package directions  -     amitriptyline (ELAVIL) 50 mg tablet; Take 1 Tablet by mouth nightly. -     ubrogepant Pearla [place]) 100 mg tablet; Take 1 Tablet by mouth once as needed for Migraine for up to 1 dose. 2. Worsening headaches  -     methylPREDNISolone (MEDROL DOSEPACK) 4 mg tablet; Per package directions  -     amitriptyline (ELAVIL) 50 mg tablet; Take 1 Tablet by mouth nightly. -     ubrogepant Pearla [place]) 100 mg tablet; Take 1 Tablet by mouth once as needed for Migraine for up to 1 dose.     3. Severe episode of recurrent major depressive disorder, without psychotic features (HCC)  -     sertraline (ZOLOFT) 100 mg tablet; Take 1 Tablet by mouth daily. 29year old female with chronic migraines that are getting worse. She is having >16 days a month with debilitating pain. She has tried and failed multiple meds mentioned above. Since she is also having a hard time with sleep I would like her to trial Amitriptyline 50 mg at night, in hopes that we can get these under better control. Keep the Sertriline. Side effects discussed. She has have a severs migraine x 4 days. I will give her a steroid dose pack to knock this cycle of headache out. I will also have her try Ubrelvy 100 mg for abortive therapy. She understands how to use this. We talked about worrisome symptoms that I would need her to go to the ER. I will see her back in 4 months to check on her. I spent 45 minutes of time today reviewing the medical record and notes, imaging, examining the patient, and face-to-face time, patient/family teaching and medication side effects, and time spent completing documentation.       ZAHRAA Cedeno

## 2023-02-03 ENCOUNTER — OFFICE VISIT (OUTPATIENT)
Dept: NEUROLOGY | Age: 33
End: 2023-02-03
Payer: MEDICAID

## 2023-02-03 VITALS
BODY MASS INDEX: 28.13 KG/M2 | RESPIRATION RATE: 17 BRPM | SYSTOLIC BLOOD PRESSURE: 140 MMHG | DIASTOLIC BLOOD PRESSURE: 71 MMHG | OXYGEN SATURATION: 97 % | HEIGHT: 61 IN | HEART RATE: 82 BPM | WEIGHT: 149 LBS

## 2023-02-03 DIAGNOSIS — F33.2 SEVERE EPISODE OF RECURRENT MAJOR DEPRESSIVE DISORDER, WITHOUT PSYCHOTIC FEATURES (HCC): ICD-10-CM

## 2023-02-03 DIAGNOSIS — G43.709 CHRONIC MIGRAINE W/O AURA, NOT INTRACTABLE, W/O STAT MIGR: Primary | ICD-10-CM

## 2023-02-03 DIAGNOSIS — R51.9 WORSENING HEADACHES: ICD-10-CM

## 2023-02-03 RX ORDER — METHYLPREDNISOLONE 4 MG/1
TABLET ORAL
Qty: 1 DOSE PACK | Refills: 0 | Status: SHIPPED | OUTPATIENT
Start: 2023-02-03

## 2023-02-03 RX ORDER — AMITRIPTYLINE HYDROCHLORIDE 50 MG/1
50 TABLET, FILM COATED ORAL
Qty: 90 TABLET | Refills: 1 | Status: SHIPPED | OUTPATIENT
Start: 2023-02-03

## 2023-02-03 RX ORDER — SERTRALINE HYDROCHLORIDE 100 MG/1
100 TABLET, FILM COATED ORAL DAILY
Qty: 90 TABLET | Refills: 3 | Status: SHIPPED | OUTPATIENT
Start: 2023-02-03

## 2023-02-03 NOTE — PROGRESS NOTES
Chief Complaint   Patient presents with    Headache     Patient reports 10/10 pain    Visit Vitals  BP (!) 140/71 (BP 1 Location: Left arm, BP Patient Position: Sitting, BP Cuff Size: Adult)   Pulse 82   Resp 17   Ht 5' 1\" (1.549 m)   Wt 149 lb (67.6 kg)   SpO2 97%   BMI 28.15 kg/m²

## 2023-06-21 ENCOUNTER — OFFICE VISIT (OUTPATIENT)
Age: 33
End: 2023-06-21
Payer: MEDICAID

## 2023-06-21 VITALS
SYSTOLIC BLOOD PRESSURE: 138 MMHG | BODY MASS INDEX: 28.13 KG/M2 | WEIGHT: 149 LBS | RESPIRATION RATE: 18 BRPM | OXYGEN SATURATION: 98 % | HEART RATE: 88 BPM | DIASTOLIC BLOOD PRESSURE: 74 MMHG | HEIGHT: 61 IN

## 2023-06-21 DIAGNOSIS — T88.7XXA SIDE EFFECT OF MEDICATION: ICD-10-CM

## 2023-06-21 DIAGNOSIS — R51.9 WORSENING HEADACHES: ICD-10-CM

## 2023-06-21 DIAGNOSIS — G43.709 CHRONIC MIGRAINE WITHOUT AURA, NOT INTRACTABLE, WITHOUT STATUS MIGRAINOSUS: Primary | ICD-10-CM

## 2023-06-21 PROCEDURE — 99215 OFFICE O/P EST HI 40 MIN: CPT

## 2023-06-21 RX ORDER — TOPIRAMATE 50 MG/1
50 TABLET, FILM COATED ORAL 2 TIMES DAILY
Qty: 180 TABLET | Refills: 1 | Status: SHIPPED | OUTPATIENT
Start: 2023-06-21

## 2023-06-21 RX ORDER — UBROGEPANT 100 MG/1
100 TABLET ORAL AS NEEDED
Qty: 16 TABLET | Refills: 8 | Status: SHIPPED | OUTPATIENT
Start: 2023-06-21

## 2023-06-21 ASSESSMENT — PATIENT HEALTH QUESTIONNAIRE - PHQ9
SUM OF ALL RESPONSES TO PHQ QUESTIONS 1-9: 0
SUM OF ALL RESPONSES TO PHQ QUESTIONS 1-9: 0
2. FEELING DOWN, DEPRESSED OR HOPELESS: 0
1. LITTLE INTEREST OR PLEASURE IN DOING THINGS: 0
SUM OF ALL RESPONSES TO PHQ QUESTIONS 1-9: 0
SUM OF ALL RESPONSES TO PHQ QUESTIONS 1-9: 0
SUM OF ALL RESPONSES TO PHQ9 QUESTIONS 1 & 2: 0

## 2023-06-21 ASSESSMENT — ENCOUNTER SYMPTOMS: PHOTOPHOBIA: 1

## 2023-06-21 NOTE — PROGRESS NOTES
Chief Complaint   Patient presents with    Follow-up    Headache      Vitals:    06/21/23 0937   BP: 138/74   Pulse: 88   Resp: 18   SpO2: 98%

## 2023-06-21 NOTE — PROGRESS NOTES
Chief Complaint   Patient presents with    Follow-up    Headache       HPI    Mrs Nicky White is a 29year old female here for follow up. I saw her last on 2/3/23 for migraines. She is Romansh speaking. At last visit she was having worsening migraines occurring almost daily. I started her on Amitriptyline 50 mg at night and gave her medrol dose pack. We are using  Amy Rodriguez #[id number] today. She is here today reporting that her migraines are worse. She has one right now that has been doing on for 4 days. Its located above her right eye and feels like a hammer. She stopped her amitriptyline because it gave her a rash on her face. The Maxalt did not help her. She is not sleeping. Denies any visual changes. Mike Meds: sertraline, sumatriptan, verapamil, propranolol, magnesium, rizatriptan, Amitriptyline      BACKGROUND  Annabelle Norton is a 29year old female here for a follow up. She is a new patient for me. She was last seen in the office by Dr Balaji Robles on 10/25/22 for migraines. She was 37 weeks pregnant at the time on Sertraline 100 mg and propranolol at bedtime. Since her last visit she ended up loosing the pregnancy and was in the ER on 11/15/22 due to a severe migraine. Her imaging was all negative for anything acute. She was feeling better after the IV medication was given to her. She speaks Romansh and a  is being utilized. Meghan Mccoy #[id number])  She is telling me that her migraines are getting worse and her medication is not working anymore. She gets a migraine every week and it lasts a total of 4 days each time (>16 days a month). She is in bed the whole time she has it with photophobia, N&V, blurry vision. She has had a severe migraine for the past 4 days now. She is taking her sertraline 100 mg but she is not sleeping since the loss of her daughter. She denies any double vision or weakness. Review of Systems   Eyes:  Positive for photophobia. Negative for visual disturbance.    Neurological:

## 2023-07-14 ENCOUNTER — TELEPHONE (OUTPATIENT)
Age: 33
End: 2023-07-14

## 2023-07-14 NOTE — TELEPHONE ENCOUNTER
Re: ubrelvy    Ivan EDDY, created case in [facility], key# [id number], approval ivan. Auth# [id number], effective 07/14/23-07/13/23.

## 2023-08-14 ENCOUNTER — TELEPHONE (OUTPATIENT)
Age: 33
End: 2023-08-14

## 2023-08-14 NOTE — TELEPHONE ENCOUNTER
Call placed to patient. Spoke with . 2 identifiers received. Advised that PA for Justa Rodarte was approved.

## 2023-09-22 ENCOUNTER — OFFICE VISIT (OUTPATIENT)
Age: 33
End: 2023-09-22
Payer: MEDICAID

## 2023-09-22 VITALS
HEIGHT: 66 IN | SYSTOLIC BLOOD PRESSURE: 126 MMHG | BODY MASS INDEX: 23.95 KG/M2 | OXYGEN SATURATION: 98 % | HEART RATE: 80 BPM | DIASTOLIC BLOOD PRESSURE: 73 MMHG | RESPIRATION RATE: 17 BRPM | WEIGHT: 149 LBS

## 2023-09-22 DIAGNOSIS — G43.709 CHRONIC MIGRAINE WITHOUT AURA, NOT INTRACTABLE, WITHOUT STATUS MIGRAINOSUS: ICD-10-CM

## 2023-09-22 PROCEDURE — 99214 OFFICE O/P EST MOD 30 MIN: CPT

## 2023-09-22 RX ORDER — TOPIRAMATE 50 MG/1
50 TABLET, FILM COATED ORAL 2 TIMES DAILY
Qty: 180 TABLET | Refills: 3 | Status: SHIPPED | OUTPATIENT
Start: 2023-09-22

## 2023-09-22 RX ORDER — UBROGEPANT 100 MG/1
100 TABLET ORAL AS NEEDED
Qty: 16 TABLET | Refills: 8 | Status: SHIPPED | OUTPATIENT
Start: 2023-09-22

## 2023-09-22 ASSESSMENT — PATIENT HEALTH QUESTIONNAIRE - PHQ9
SUM OF ALL RESPONSES TO PHQ QUESTIONS 1-9: 0
SUM OF ALL RESPONSES TO PHQ QUESTIONS 1-9: 0
1. LITTLE INTEREST OR PLEASURE IN DOING THINGS: 0
SUM OF ALL RESPONSES TO PHQ9 QUESTIONS 1 & 2: 0
SUM OF ALL RESPONSES TO PHQ QUESTIONS 1-9: 0
2. FEELING DOWN, DEPRESSED OR HOPELESS: 0
SUM OF ALL RESPONSES TO PHQ QUESTIONS 1-9: 0

## 2023-09-22 ASSESSMENT — ENCOUNTER SYMPTOMS: PHOTOPHOBIA: 0

## 2023-09-22 NOTE — PROGRESS NOTES
Chief Complaint   Patient presents with    Follow-up    Headache      Vitals:    09/22/23 1007   BP: 126/73   Pulse: 80   Resp: 17   SpO2: 98%
and automatic exposure control for dose modulation. FINDINGS:    Ventricles: Midline, no hydrocephalus. Intracranial Hemorrhage: None. Brain Parenchyma/Brainstem: Normal for age. Basal Cisterns: Normal.  Paranasal Sinuses: Visualized sinuses are clear. Additional Comments: N/A. Impression  No acute process. CTA CHEST W WO CONTRAST 10/18/2021    Narrative  EXAM: CT ANGIOGRAPHY CHEST    INDICATION:  chest pain and shortness of breath x 3 months (off and on)    COMPARISON: None. Roxianne Bruner CONTRAST: 100 mL of Isovue-370. TECHNIQUE:  Precontrast  images were obtained to localize the volume for acquisition. Multislice helical CT arteriography was performed from the diaphragm to the  thoracic inlet during uneventful rapid bolus intravenous contrast  administration. Lung and soft tissue windows were generated. Coronal and  sagittal  reformatted images were also generated. 3D post processing consisting  of coronal maximum intensity projection images was performed. CT dose reduction  was achieved through use of a standardized protocol tailored for this  examination and automatic exposure control for dose modulation. FINDINGS:  The lungs are clear of mass, nodule, airspace disease or edema. Prominent lymph nodes in the axillae bilaterally, the largest on the right 14 mm  in short axis diameter and the largest on the left 12 mm in short axis diameter. The pulmonary arteries are well enhanced and no pulmonary emboli are identified. There is no mediastinal or hilar adenopathy or mass. Normal and indeterminate  sized mediastinal and hilar lymph nodes. Residual thymic tissue probably within  normal limits for age. The aorta enhances normally without evidence of aneurysm  or dissection. The visualized portions of the upper abdominal organs show upper normal spleen  size. Impression  1. No CT evidence for central pulmonary embolus at this time .   2. Prominent lymph nodes in the axillae

## 2023-11-10 ENCOUNTER — TELEPHONE (OUTPATIENT)
Age: 33
End: 2023-11-10

## 2023-11-10 DIAGNOSIS — G43.709 CHRONIC MIGRAINE WITHOUT AURA, NOT INTRACTABLE, WITHOUT STATUS MIGRAINOSUS: ICD-10-CM

## 2023-11-10 NOTE — TELEPHONE ENCOUNTER
Patient's  is calling to request a refill of Ubrogepant to be sent to the [facility] on file. Terbinafine Pregnancy And Lactation Text: This medication is Pregnancy Category B and is considered safe during pregnancy. It is also excreted in breast milk and breast feeding isn't recommended.

## 2023-11-13 RX ORDER — UBROGEPANT 100 MG/1
100 TABLET ORAL AS NEEDED
Qty: 16 TABLET | Refills: 8 | Status: SHIPPED | OUTPATIENT
Start: 2023-11-13

## 2023-12-15 ENCOUNTER — TELEPHONE (OUTPATIENT)
Age: 33
End: 2023-12-15

## 2023-12-15 NOTE — TELEPHONE ENCOUNTER
Patient came with her  to the office today to ask if Ramez Hartmann can switch her Ubrelvy 100 mg because she figured out that she is pregnant and as she told by [facility] , she has to stopped immediately and she was so worried because she had it for the last two weeks  without knowing she is pregnant. Patient needs advice and if lobo change her medication ,can she send it to the same pharmacy on file.

## 2023-12-15 NOTE — TELEPHONE ENCOUNTER
Call placed to patient. 2 identifiers received. Advised patient to stop the Edra Yoana (patient stated she already did) and that I would route her message to MATT Sparks and call her back with MATT Cheng's response. Patient indicated understanding.

## 2023-12-27 ENCOUNTER — TELEPHONE (OUTPATIENT)
Age: 33
End: 2023-12-27

## 2023-12-27 NOTE — TELEPHONE ENCOUNTER
Re: Jhonatan EDDY in [facility] , created case in [facility] key#[id number], awaiting outcome. PA is now approved, auth# [id number], effective 23-24, fyi to nurse.

## 2024-01-29 ENCOUNTER — TELEPHONE (OUTPATIENT)
Age: 34
End: 2024-01-29

## 2024-01-29 NOTE — TELEPHONE ENCOUNTER
Call placed to patient.  2 identifiers received.  Advised patient's   that Ubrelvy is approved and can be picked up at [facility].

## 2024-01-29 NOTE — TELEPHONE ENCOUNTER
Patient came to the office today and she asked if you can send her Ubrelvy 100 mg to the Pharmacy and she stated that she lost her baby and she is not Pregnant any more and her provider told her she can go back to it again and stopped if she got pregnant again.

## 2024-08-27 LAB
C. TRACHOMATIS, EXTERNAL RESULT: NEGATIVE
HEP B, EXTERNAL RESULT: NORMAL
HEPATITIS C ANTIBODY, EXTERNAL RESULT: NORMAL
HIV, EXTERNAL RESULT: NORMAL
N. GONORRHOEAE, EXTERNAL RESULT: NEGATIVE
RUBELLA TITER, EXTERNAL RESULT: NORMAL
T. PALLIDUM (SYPHILIS) ANTIBODY, EXTERNAL RESULT: NORMAL

## 2024-09-23 ENCOUNTER — OFFICE VISIT (OUTPATIENT)
Age: 34
End: 2024-09-23
Payer: MEDICAID

## 2024-09-23 VITALS
HEIGHT: 61 IN | BODY MASS INDEX: 28.13 KG/M2 | RESPIRATION RATE: 16 BRPM | OXYGEN SATURATION: 98 % | HEART RATE: 93 BPM | SYSTOLIC BLOOD PRESSURE: 126 MMHG | WEIGHT: 149 LBS | DIASTOLIC BLOOD PRESSURE: 82 MMHG

## 2024-09-23 DIAGNOSIS — Z3A.13 13 WEEKS GESTATION OF PREGNANCY: ICD-10-CM

## 2024-09-23 DIAGNOSIS — G43.709 CHRONIC MIGRAINE WITHOUT AURA, NOT INTRACTABLE, WITHOUT STATUS MIGRAINOSUS: Primary | ICD-10-CM

## 2024-09-23 PROCEDURE — 99214 OFFICE O/P EST MOD 30 MIN: CPT

## 2024-09-23 RX ORDER — BUTALBITAL, ACETAMINOPHEN AND CAFFEINE 50; 325; 40 MG/1; MG/1; MG/1
1 TABLET ORAL EVERY 4 HOURS PRN
Qty: 180 TABLET | Refills: 3 | Status: SHIPPED | OUTPATIENT
Start: 2024-09-23

## 2024-09-23 ASSESSMENT — PATIENT HEALTH QUESTIONNAIRE - PHQ9
SUM OF ALL RESPONSES TO PHQ QUESTIONS 1-9: 0
2. FEELING DOWN, DEPRESSED OR HOPELESS: NOT AT ALL
1. LITTLE INTEREST OR PLEASURE IN DOING THINGS: NOT AT ALL
SUM OF ALL RESPONSES TO PHQ9 QUESTIONS 1 & 2: 0

## 2025-02-10 ENCOUNTER — OFFICE VISIT (OUTPATIENT)
Age: 35
End: 2025-02-10
Payer: MEDICAID

## 2025-02-10 VITALS
RESPIRATION RATE: 18 BRPM | HEIGHT: 60 IN | DIASTOLIC BLOOD PRESSURE: 70 MMHG | SYSTOLIC BLOOD PRESSURE: 109 MMHG | HEART RATE: 95 BPM | OXYGEN SATURATION: 96 % | WEIGHT: 154 LBS | TEMPERATURE: 98.3 F | BODY MASS INDEX: 30.23 KG/M2

## 2025-02-10 DIAGNOSIS — Z3A.33 33 WEEKS GESTATION OF PREGNANCY: ICD-10-CM

## 2025-02-10 DIAGNOSIS — O24.410 DIET CONTROLLED GESTATIONAL DIABETES MELLITUS (GDM) IN THIRD TRIMESTER: ICD-10-CM

## 2025-02-10 DIAGNOSIS — Z00.00 WELL WOMAN EXAM WITHOUT GYNECOLOGICAL EXAM: Primary | ICD-10-CM

## 2025-02-10 PROCEDURE — 99385 PREV VISIT NEW AGE 18-39: CPT | Performed by: FAMILY MEDICINE

## 2025-02-10 RX ORDER — B-COMPLEX WITH VITAMIN C
TABLET ORAL
COMMUNITY

## 2025-02-10 SDOH — ECONOMIC STABILITY: FOOD INSECURITY: WITHIN THE PAST 12 MONTHS, THE FOOD YOU BOUGHT JUST DIDN'T LAST AND YOU DIDN'T HAVE MONEY TO GET MORE.: NEVER TRUE

## 2025-02-10 SDOH — ECONOMIC STABILITY: FOOD INSECURITY: WITHIN THE PAST 12 MONTHS, YOU WORRIED THAT YOUR FOOD WOULD RUN OUT BEFORE YOU GOT MONEY TO BUY MORE.: NEVER TRUE

## 2025-02-10 ASSESSMENT — ENCOUNTER SYMPTOMS
GASTROINTESTINAL NEGATIVE: 1
RESPIRATORY NEGATIVE: 1
ALLERGIC/IMMUNOLOGIC NEGATIVE: 1
EYES NEGATIVE: 1

## 2025-02-10 ASSESSMENT — PATIENT HEALTH QUESTIONNAIRE - PHQ9
2. FEELING DOWN, DEPRESSED OR HOPELESS: NOT AT ALL
6. FEELING BAD ABOUT YOURSELF - OR THAT YOU ARE A FAILURE OR HAVE LET YOURSELF OR YOUR FAMILY DOWN: NOT AT ALL
4. FEELING TIRED OR HAVING LITTLE ENERGY: MORE THAN HALF THE DAYS
SUM OF ALL RESPONSES TO PHQ9 QUESTIONS 1 & 2: 0
3. TROUBLE FALLING OR STAYING ASLEEP: MORE THAN HALF THE DAYS
9. THOUGHTS THAT YOU WOULD BE BETTER OFF DEAD, OR OF HURTING YOURSELF: NOT AT ALL
10. IF YOU CHECKED OFF ANY PROBLEMS, HOW DIFFICULT HAVE THESE PROBLEMS MADE IT FOR YOU TO DO YOUR WORK, TAKE CARE OF THINGS AT HOME, OR GET ALONG WITH OTHER PEOPLE: NOT DIFFICULT AT ALL
SUM OF ALL RESPONSES TO PHQ QUESTIONS 1-9: 4
SUM OF ALL RESPONSES TO PHQ QUESTIONS 1-9: 4
7. TROUBLE CONCENTRATING ON THINGS, SUCH AS READING THE NEWSPAPER OR WATCHING TELEVISION: NOT AT ALL
SUM OF ALL RESPONSES TO PHQ QUESTIONS 1-9: 4
SUM OF ALL RESPONSES TO PHQ QUESTIONS 1-9: 4
1. LITTLE INTEREST OR PLEASURE IN DOING THINGS: NOT AT ALL
8. MOVING OR SPEAKING SO SLOWLY THAT OTHER PEOPLE COULD HAVE NOTICED. OR THE OPPOSITE, BEING SO FIGETY OR RESTLESS THAT YOU HAVE BEEN MOVING AROUND A LOT MORE THAN USUAL: NOT AT ALL
5. POOR APPETITE OR OVEREATING: NOT AT ALL

## 2025-02-10 NOTE — PATIENT INSTRUCTIONS
Follow up after delivery for screening for type 2 diabetes with an oral glucose tolerance test.

## 2025-02-10 NOTE — PROGRESS NOTES
Mata Castro is a 34 y.o. female presenting for New Patient      /70 (Site: Left Upper Arm, Position: Sitting, Cuff Size: Medium Adult)   Pulse 95   Temp 98.3 °F (36.8 °C) (Oral)   Resp 18   Ht 1.524 m (5')   Wt 69.9 kg (154 lb)   SpO2 96%   BMI 30.08 kg/m²       Current Outpatient Medications   Medication Sig Dispense Refill    Prenatal Multivit-Min-Fe-FA (PRENATAL, W/IRON & FA,) 27-0.8 MG TABS Prenatal (w/Iron & FA)   DUNNAVANT JORGE S      butalbital-acetaminophen-caffeine (FIORICET, ESGIC) -40 MG per tablet Take 1 tablet by mouth every 4 hours as needed for Headaches 180 tablet 3     No current facility-administered medications for this visit.          \"Have you been to the ER, urgent care clinic since your last visit?  Hospitalized since your last visit?\"    NO    “Have you seen or consulted any other health care providers outside of [facility] since your last visit?”    NO     “Have you had a pap smear?”    YES - Where: [facility]. Unknown doctor because she was unconscious. Doctor said he/she will repeat the pap smear at the end of pregnancy.    Patient is currently pregnant.    Nurse/CMA to request most recent records if not in the chart    No cervical cancer screening on file             Click Here for Release of Records Request                       
thyroid mass, thyromegaly or thyroid tenderness.   Cardiovascular:      Rate and Rhythm: Normal rate and regular rhythm.      Pulses: Normal pulses.      Heart sounds: Normal heart sounds. No murmur heard.     No friction rub. No gallop.   Pulmonary:      Effort: Pulmonary effort is normal. No respiratory distress.      Breath sounds: Normal breath sounds. No wheezing, rhonchi or rales.   Abdominal:      General: Bowel sounds are normal. There is no distension.      Palpations: Abdomen is soft.      Tenderness: There is no abdominal tenderness. There is no guarding or rebound.      Comments: Gravid- appropriate for gestational age.    Musculoskeletal:      Right lower leg: No edema.      Left lower leg: No edema.   Lymphadenopathy:      Cervical: No cervical adenopathy.   Skin:     General: Skin is warm and dry.   Neurological:      General: No focal deficit present.      Mental Status: She is alert. Mental status is at baseline.      Cranial Nerves: No cranial nerve deficit.      Sensory: No sensory deficit.      Deep Tendon Reflexes: Reflexes normal.   Psychiatric:         Mood and Affect: Mood normal.         Behavior: Behavior normal.         Thought Content: Thought content normal.         Judgment: Judgment normal.              2/10/2025     9:12 AM   PHQ-9    Little interest or pleasure in doing things 0   Feeling down, depressed, or hopeless 0   Trouble falling or staying asleep, or sleeping too much 2   Feeling tired or having little energy 2   Poor appetite or overeating 0   Feeling bad about yourself - or that you are a failure or have let yourself or your family down 0   Trouble concentrating on things, such as reading the newspaper or watching television 0   Moving or speaking so slowly that other people could have noticed. Or the opposite - being so fidgety or restless that you have been moving around a lot more than usual 0   Thoughts that you would be better off dead, or of hurting yourself in some

## 2025-03-07 LAB — GBS, EXTERNAL RESULT: NEGATIVE

## 2025-03-17 ENCOUNTER — HOSPITAL ENCOUNTER (OUTPATIENT)
Facility: HOSPITAL | Age: 35
Discharge: HOME | DRG: 540 | End: 2025-03-17
Attending: OBSTETRICS & GYNECOLOGY | Admitting: OBSTETRICS & GYNECOLOGY
Payer: MEDICAID

## 2025-03-17 VITALS
BODY MASS INDEX: 30.21 KG/M2 | HEIGHT: 61 IN | DIASTOLIC BLOOD PRESSURE: 78 MMHG | OXYGEN SATURATION: 98 % | TEMPERATURE: 98.1 F | WEIGHT: 160 LBS | RESPIRATION RATE: 16 BRPM | SYSTOLIC BLOOD PRESSURE: 112 MMHG | HEART RATE: 88 BPM

## 2025-03-17 LAB
ABO + RH BLD: NORMAL
BASOPHILS # BLD: 0 K/UL (ref 0–0.1)
BASOPHILS NFR BLD: 0 % (ref 0–1)
BLOOD GROUP ANTIBODIES SERPL: NORMAL
DIFFERENTIAL METHOD BLD: ABNORMAL
EOSINOPHIL # BLD: 0 K/UL (ref 0–0.4)
EOSINOPHIL NFR BLD: 0 % (ref 0–7)
ERYTHROCYTE [DISTWIDTH] IN BLOOD BY AUTOMATED COUNT: 13.6 % (ref 11.5–14.5)
HCT VFR BLD AUTO: 34.5 % (ref 35–47)
HGB BLD-MCNC: 11.9 G/DL (ref 11.5–16)
IMM GRANULOCYTES # BLD AUTO: 0 K/UL
IMM GRANULOCYTES NFR BLD AUTO: 0 %
LYMPHOCYTES # BLD: 2.07 K/UL (ref 0.8–3.5)
LYMPHOCYTES NFR BLD: 45 % (ref 12–49)
MCH RBC QN AUTO: 28.5 PG (ref 26–34)
MCHC RBC AUTO-ENTMCNC: 34.5 G/DL (ref 30–36.5)
MCV RBC AUTO: 82.5 FL (ref 80–99)
MONOCYTES # BLD: 0.18 K/UL (ref 0–1)
MONOCYTES NFR BLD: 4 % (ref 5–13)
NEUTS SEG # BLD: 2.35 K/UL (ref 1.8–8)
NEUTS SEG NFR BLD: 51 % (ref 32–75)
NRBC # BLD: 0 K/UL (ref 0–0.01)
NRBC BLD-RTO: 0 PER 100 WBC
PLATELET # BLD AUTO: 181 K/UL (ref 150–400)
RBC # BLD AUTO: 4.18 M/UL (ref 3.8–5.2)
RBC MORPH BLD: ABNORMAL
RPR SER QL: NONREACTIVE
SPECIMEN EXP DATE BLD: NORMAL
WBC # BLD AUTO: 4.6 K/UL (ref 3.6–11)
WBC MORPH BLD: ABNORMAL

## 2025-03-17 PROCEDURE — 86900 BLOOD TYPING SEROLOGIC ABO: CPT

## 2025-03-17 PROCEDURE — 86901 BLOOD TYPING SEROLOGIC RH(D): CPT

## 2025-03-17 PROCEDURE — 85025 COMPLETE CBC W/AUTO DIFF WBC: CPT

## 2025-03-17 PROCEDURE — 86850 RBC ANTIBODY SCREEN: CPT

## 2025-03-17 PROCEDURE — 86592 SYPHILIS TEST NON-TREP QUAL: CPT

## 2025-03-17 NOTE — PROGRESS NOTES
3/17/2025 10:04 AM Pt of Dr. Storm arrives ambulatory with significant other for scheduled PAT appointment. Pt is , GA 38w0d. PMH significant for previous c/s and IUFD. Pt has NKA and takes daily PNV. Pt denies LOF, VB, or contractions, endorses (+) fetal movement. Pt denies headache, blurred vision, or RUQ pain. Pt oriented to room and call bell within reach.    1055 Discharge paperwork reviewed. Opportunity for questions was provided. Pt verbalized understanding. Pt discharged home ambulatory with significant other.

## 2025-03-18 ENCOUNTER — HOSPITAL ENCOUNTER (INPATIENT)
Facility: HOSPITAL | Age: 35
LOS: 2 days | Discharge: HOME | DRG: 540 | End: 2025-03-20
Attending: STUDENT IN AN ORGANIZED HEALTH CARE EDUCATION/TRAINING PROGRAM | Admitting: STUDENT IN AN ORGANIZED HEALTH CARE EDUCATION/TRAINING PROGRAM
Payer: MEDICAID

## 2025-03-18 PROBLEM — Z87.59 HISTORY OF POSTPARTUM DEPRESSION: Status: ACTIVE | Noted: 2025-03-18

## 2025-03-18 PROCEDURE — 2709999900 HC NON-CHARGEABLE SUPPLY: Performed by: STUDENT IN AN ORGANIZED HEALTH CARE EDUCATION/TRAINING PROGRAM

## 2025-03-18 PROCEDURE — 2720000010 HC SURG SUPPLY STERILE: Performed by: STUDENT IN AN ORGANIZED HEALTH CARE EDUCATION/TRAINING PROGRAM

## 2025-03-18 PROCEDURE — 3609079900 HC CESAREAN SECTION: Performed by: STUDENT IN AN ORGANIZED HEALTH CARE EDUCATION/TRAINING PROGRAM

## 2025-03-18 PROCEDURE — 3700000000 HC ANESTHESIA ATTENDED CARE: Performed by: STUDENT IN AN ORGANIZED HEALTH CARE EDUCATION/TRAINING PROGRAM

## 2025-03-18 PROCEDURE — 6360000002 HC RX W HCPCS: Performed by: STUDENT IN AN ORGANIZED HEALTH CARE EDUCATION/TRAINING PROGRAM

## 2025-03-18 PROCEDURE — 2500000003 HC RX 250 WO HCPCS: Performed by: STUDENT IN AN ORGANIZED HEALTH CARE EDUCATION/TRAINING PROGRAM

## 2025-03-18 PROCEDURE — 7100000000 HC PACU RECOVERY - FIRST 15 MIN: Performed by: STUDENT IN AN ORGANIZED HEALTH CARE EDUCATION/TRAINING PROGRAM

## 2025-03-18 PROCEDURE — 7100000001 HC PACU RECOVERY - ADDTL 15 MIN: Performed by: STUDENT IN AN ORGANIZED HEALTH CARE EDUCATION/TRAINING PROGRAM

## 2025-03-18 PROCEDURE — 1120000000 HC RM PRIVATE OB

## 2025-03-18 PROCEDURE — 3700000001 HC ADD 15 MINUTES (ANESTHESIA): Performed by: STUDENT IN AN ORGANIZED HEALTH CARE EDUCATION/TRAINING PROGRAM

## 2025-03-18 PROCEDURE — 6370000000 HC RX 637 (ALT 250 FOR IP): Performed by: STUDENT IN AN ORGANIZED HEALTH CARE EDUCATION/TRAINING PROGRAM

## 2025-03-18 PROCEDURE — 2580000003 HC RX 258: Performed by: STUDENT IN AN ORGANIZED HEALTH CARE EDUCATION/TRAINING PROGRAM

## 2025-03-18 PROCEDURE — 6360000002 HC RX W HCPCS: Performed by: ANESTHESIOLOGY

## 2025-03-18 RX ORDER — SODIUM CHLORIDE 0.9 % (FLUSH) 0.9 %
10 SYRINGE (ML) INJECTION PRN
Status: DISCONTINUED | OUTPATIENT
Start: 2025-03-18 | End: 2025-03-18 | Stop reason: SDUPTHER

## 2025-03-18 RX ORDER — MISOPROSTOL 200 UG/1
800 TABLET ORAL PRN
Status: DISCONTINUED | OUTPATIENT
Start: 2025-03-18 | End: 2025-03-20 | Stop reason: HOSPADM

## 2025-03-18 RX ORDER — CARBOPROST TROMETHAMINE 250 UG/ML
250 INJECTION, SOLUTION INTRAMUSCULAR PRN
Status: DISCONTINUED | OUTPATIENT
Start: 2025-03-18 | End: 2025-03-20 | Stop reason: HOSPADM

## 2025-03-18 RX ORDER — OXYCODONE HYDROCHLORIDE 5 MG/1
10 TABLET ORAL EVERY 4 HOURS PRN
Status: DISCONTINUED | OUTPATIENT
Start: 2025-03-19 | End: 2025-03-20 | Stop reason: HOSPADM

## 2025-03-18 RX ORDER — POLYETHYLENE GLYCOL 3350 17 G/17G
17 POWDER, FOR SOLUTION ORAL DAILY PRN
Status: DISCONTINUED | OUTPATIENT
Start: 2025-03-18 | End: 2025-03-20 | Stop reason: HOSPADM

## 2025-03-18 RX ORDER — NALOXONE HYDROCHLORIDE 0.4 MG/ML
INJECTION, SOLUTION INTRAMUSCULAR; INTRAVENOUS; SUBCUTANEOUS PRN
Status: ACTIVE | OUTPATIENT
Start: 2025-03-18 | End: 2025-03-19

## 2025-03-18 RX ORDER — NALBUPHINE HYDROCHLORIDE 10 MG/ML
5 INJECTION INTRAMUSCULAR; INTRAVENOUS; SUBCUTANEOUS EVERY 4 HOURS PRN
Status: DISPENSED | OUTPATIENT
Start: 2025-03-18 | End: 2025-03-19

## 2025-03-18 RX ORDER — ONDANSETRON 4 MG/1
4 TABLET, ORALLY DISINTEGRATING ORAL EVERY 8 HOURS PRN
Status: DISCONTINUED | OUTPATIENT
Start: 2025-03-18 | End: 2025-03-20 | Stop reason: HOSPADM

## 2025-03-18 RX ORDER — METHYLERGONOVINE MALEATE 0.2 MG/ML
200 INJECTION INTRAVENOUS PRN
Status: DISCONTINUED | OUTPATIENT
Start: 2025-03-18 | End: 2025-03-20 | Stop reason: HOSPADM

## 2025-03-18 RX ORDER — SODIUM CHLORIDE 0.9 % (FLUSH) 0.9 %
10 SYRINGE (ML) INJECTION EVERY 12 HOURS SCHEDULED
Status: DISCONTINUED | OUTPATIENT
Start: 2025-03-18 | End: 2025-03-18 | Stop reason: SDUPTHER

## 2025-03-18 RX ORDER — IBUPROFEN 400 MG/1
800 TABLET, FILM COATED ORAL EVERY 8 HOURS
Status: DISCONTINUED | OUTPATIENT
Start: 2025-03-19 | End: 2025-03-20 | Stop reason: HOSPADM

## 2025-03-18 RX ORDER — DOCUSATE SODIUM 100 MG/1
100 CAPSULE, LIQUID FILLED ORAL 2 TIMES DAILY PRN
Status: DISCONTINUED | OUTPATIENT
Start: 2025-03-18 | End: 2025-03-20 | Stop reason: HOSPADM

## 2025-03-18 RX ORDER — SODIUM CHLORIDE, SODIUM LACTATE, POTASSIUM CHLORIDE, CALCIUM CHLORIDE 600; 310; 30; 20 MG/100ML; MG/100ML; MG/100ML; MG/100ML
INJECTION, SOLUTION INTRAVENOUS CONTINUOUS
Status: DISPENSED | OUTPATIENT
Start: 2025-03-18 | End: 2025-03-19

## 2025-03-18 RX ORDER — SIMETHICONE 80 MG
80 TABLET,CHEWABLE ORAL EVERY 6 HOURS PRN
Status: DISCONTINUED | OUTPATIENT
Start: 2025-03-18 | End: 2025-03-20 | Stop reason: HOSPADM

## 2025-03-18 RX ORDER — SODIUM CHLORIDE 0.9 % (FLUSH) 0.9 %
5-40 SYRINGE (ML) INJECTION EVERY 12 HOURS SCHEDULED
Status: DISCONTINUED | OUTPATIENT
Start: 2025-03-18 | End: 2025-03-20 | Stop reason: HOSPADM

## 2025-03-18 RX ORDER — KETOROLAC TROMETHAMINE 30 MG/ML
30 INJECTION, SOLUTION INTRAMUSCULAR; INTRAVENOUS EVERY 6 HOURS
Status: COMPLETED | OUTPATIENT
Start: 2025-03-18 | End: 2025-03-19

## 2025-03-18 RX ORDER — SODIUM CHLORIDE 9 MG/ML
INJECTION, SOLUTION INTRAVENOUS PRN
Status: DISCONTINUED | OUTPATIENT
Start: 2025-03-18 | End: 2025-03-20 | Stop reason: HOSPADM

## 2025-03-18 RX ORDER — SODIUM CHLORIDE 9 MG/ML
INJECTION, SOLUTION INTRAVENOUS PRN
Status: DISCONTINUED | OUTPATIENT
Start: 2025-03-18 | End: 2025-03-18 | Stop reason: SDUPTHER

## 2025-03-18 RX ORDER — ACETAMINOPHEN 500 MG
1000 TABLET ORAL EVERY 8 HOURS SCHEDULED
Status: DISCONTINUED | OUTPATIENT
Start: 2025-03-18 | End: 2025-03-20 | Stop reason: HOSPADM

## 2025-03-18 RX ORDER — MISOPROSTOL 200 UG/1
400 TABLET ORAL PRN
Status: DISCONTINUED | OUTPATIENT
Start: 2025-03-18 | End: 2025-03-20 | Stop reason: HOSPADM

## 2025-03-18 RX ORDER — ONDANSETRON 2 MG/ML
4 INJECTION INTRAMUSCULAR; INTRAVENOUS EVERY 6 HOURS PRN
Status: DISCONTINUED | OUTPATIENT
Start: 2025-03-18 | End: 2025-03-20 | Stop reason: HOSPADM

## 2025-03-18 RX ORDER — SODIUM CHLORIDE 0.9 % (FLUSH) 0.9 %
5-40 SYRINGE (ML) INJECTION PRN
Status: DISCONTINUED | OUTPATIENT
Start: 2025-03-18 | End: 2025-03-20 | Stop reason: HOSPADM

## 2025-03-18 RX ORDER — ONDANSETRON 2 MG/ML
4 INJECTION INTRAMUSCULAR; INTRAVENOUS EVERY 6 HOURS PRN
Status: DISCONTINUED | OUTPATIENT
Start: 2025-03-18 | End: 2025-03-18 | Stop reason: SDUPTHER

## 2025-03-18 RX ORDER — SODIUM CHLORIDE, SODIUM LACTATE, POTASSIUM CHLORIDE, CALCIUM CHLORIDE 600; 310; 30; 20 MG/100ML; MG/100ML; MG/100ML; MG/100ML
INJECTION, SOLUTION INTRAVENOUS CONTINUOUS
Status: DISCONTINUED | OUTPATIENT
Start: 2025-03-18 | End: 2025-03-18 | Stop reason: SDUPTHER

## 2025-03-18 RX ORDER — ACETAMINOPHEN 325 MG/1
975 TABLET ORAL ONCE
Status: COMPLETED | OUTPATIENT
Start: 2025-03-18 | End: 2025-03-18

## 2025-03-18 RX ORDER — MODIFIED LANOLIN
OINTMENT (GRAM) TOPICAL
Status: DISCONTINUED | OUTPATIENT
Start: 2025-03-18 | End: 2025-03-20 | Stop reason: HOSPADM

## 2025-03-18 RX ORDER — OXYCODONE HYDROCHLORIDE 5 MG/1
5 TABLET ORAL EVERY 4 HOURS PRN
Status: DISCONTINUED | OUTPATIENT
Start: 2025-03-19 | End: 2025-03-20 | Stop reason: HOSPADM

## 2025-03-18 RX ORDER — SODIUM CHLORIDE, SODIUM LACTATE, POTASSIUM CHLORIDE, AND CALCIUM CHLORIDE .6; .31; .03; .02 G/100ML; G/100ML; G/100ML; G/100ML
1000 INJECTION, SOLUTION INTRAVENOUS ONCE
Status: DISCONTINUED | OUTPATIENT
Start: 2025-03-18 | End: 2025-03-18

## 2025-03-18 RX ADMIN — KETOROLAC TROMETHAMINE 30 MG: 30 INJECTION, SOLUTION INTRAMUSCULAR at 17:58

## 2025-03-18 RX ADMIN — ACETAMINOPHEN 975 MG: 325 TABLET ORAL at 09:10

## 2025-03-18 RX ADMIN — ACETAMINOPHEN 1000 MG: 500 TABLET ORAL at 21:28

## 2025-03-18 RX ADMIN — FAMOTIDINE 20 MG: 10 INJECTION, SOLUTION INTRAVENOUS at 09:10

## 2025-03-18 RX ADMIN — SODIUM CHLORIDE, SODIUM LACTATE, POTASSIUM CHLORIDE, AND CALCIUM CHLORIDE 1000 ML: .6; .31; .03; .02 INJECTION, SOLUTION INTRAVENOUS at 08:35

## 2025-03-18 RX ADMIN — SODIUM CHLORIDE, POTASSIUM CHLORIDE, SODIUM LACTATE AND CALCIUM CHLORIDE: 600; 310; 30; 20 INJECTION, SOLUTION INTRAVENOUS at 09:59

## 2025-03-18 RX ADMIN — NALBUPHINE HYDROCHLORIDE 5 MG: 10 INJECTION, SOLUTION INTRAMUSCULAR; INTRAVENOUS; SUBCUTANEOUS at 14:19

## 2025-03-18 RX ADMIN — DOCUSATE SODIUM 100 MG: 100 CAPSULE, LIQUID FILLED ORAL at 21:28

## 2025-03-18 RX ADMIN — WATER 2000 MG: 1 INJECTION INTRAMUSCULAR; INTRAVENOUS; SUBCUTANEOUS at 10:00

## 2025-03-18 ASSESSMENT — PAIN SCALES - GENERAL: PAINLEVEL_OUTOF10: 0

## 2025-03-18 NOTE — LACTATION NOTE
This note was copied from a baby's chart.  Parents of infant requesting to supplement infant due to insatiability and mom is tired. Parents plan to use donor breast milk to supplement after breastfeeding when needed.

## 2025-03-18 NOTE — OP NOTE
Operative Note  Patient - Rohit Monaco  Medical Record Number - [id number]   YOB: 1990      DATE AND TIME OF PROCEDURE: 3/18/2025 11:33 AM     PREOPERATIVE DIAGNOSIS:   1. Intrauterine pregnancy at 38w1d  2. Hx of IUFD @ 39w ()  3. Hx of CS x1 >  x3  4. GDMA 1  5. BMI 30+    POSTOPERATIVE DIAGNOSIS:   1. S/p 2LTCS of viable female infant at 38w1d  2-5. Same  6. Abdominal wall adhesions    PROCEDURE(S): Procedure(s):   SECTION (E R A S)     ANESTHESIA: Spinal    SURGEON:  Shayna Savage DO    ASSISTANT: First Assistant: Breanna Zhu RN     QUANTITATIVE BLOOD LOSS AT PROCEDURE END: QBL pending but  mL    COMPLICATIONS: none    IMPLANTS: *No implants in the log*    SPECIMENS: none    FINDINGS: Viable female infant delivered, weighing 2.895 kg (6 lb 6.1 oz). Moderate adhesions between subcutaneous tissue, fascia, abdominal muscles and peritoneum. Normal uterus, tubes, and ovaries.    PROPHYLACTIC ANTIBIOTICS: Ancef    DVT PROPHYLAXIS: Sequential Compression Devices         Fetal Description: hernández     Birth Information:   Information for the patient's :  Vinnie Monaco [[id number]]   @[id number]@    Umbilical Cord: 3 vessels present    Placenta:  spontaneous        Procedure Detail:      After proper patient identification and consent, the patient was taken to the operating room, where spinal anesthesia was administered and found to be adequate. Fernando catheter had been placed using sterile technique. She was prepped and draped in the normal sterile fashion in the dorsal supine position with leftward tilt. All pressure points were padded and warm blankets were used to maintain control of core body temperature.    The abdomen was entered using the Pfannenstiel technique. The peritoneum was identified and entered bluntly well superior to the bladder without any apparent injury. The bladder blade was inserted, vesicouterine peritoneum

## 2025-03-18 NOTE — PROGRESS NOTES
0800 Pt arrived from home for scheduled repeat c/s.   0840 Dr Savage at bedside, online  used session code [id number].   1003 To OR 1 ambulatory, anesthesia awaiting pt in OR.  1135 Return to LDR 8 via bed, pt bonding with infant.   1230 Dressing replaced with CHARLEY dressing. Dr Savage aware.  1305 Dr Savage notified of breakthrough bleeding, will assess.  1308 CHARLEY dressing removed, pressure dressing placed by Dr Savage.   1330 TRANSFER - OUT REPORT:    Verbal report given to OUMOU Correa on Rohit Monaco  being transferred to  for routine progression of patient care       Report consisted of patient's Situation, Background, Assessment and   Recommendations(SBAR).     Information from the following report(s) Nurse Handoff Report, Intake/Output, MAR, and Recent Results was reviewed with the receiving nurse.           Lines:   Peripheral IV 03/18/25 Posterior;Right Forearm (Active)        Opportunity for questions and clarification was provided.      Patient transported with:  Registered Nurse

## 2025-03-18 NOTE — H&P
History & Physical    Name: Rohit Monaco MRN: [id number]  SSN: [id number]    YOB: 1990  Age: 34 y.o.  Sex: female      Subjective:     Chief Complaint:  Pregnancy and scheduled CS  Amharic  used: Ghanshyam #[id number]    HPI:  Patient presents to L&D for scheduled CS. She is doing well. Reports good FM. Denies regular contractions, VB, LOF. Has her brother and  present.    Preg complications  -Hx of 39w IUFD ()  -Hx of CS x1 >  x3  -GDMA1 - well-controlled, last growth @ 36w EFW 66%ile  -Hx of PPD  -Migraines  -Marginal cord insertion  -BMI 33    Estimated Date of Delivery: 3/31/25  OB History    Para Term  AB Living   5 4    3   SAB IAB Ectopic Molar Multiple Live Births        3      # Outcome Date GA Lbr Rolly/2nd Weight Sex Type Anes PTL Lv   5 Current            4 Para         FD   3 Para         KATHLEEN   2 Para         KATHLEEN   1 Para      CS-LTranv   KATHLEEN       No past medical history on file.  Past Surgical History:   Procedure Laterality Date     SECTION       Social History     Occupational History    Not on file   Tobacco Use    Smoking status: Never    Smokeless tobacco: Never   Substance and Sexual Activity    Alcohol use: Never    Drug use: Never    Sexual activity: Yes     No family history on file.    No Known Allergies  Prior to Admission medications    Not on File        Review of Systems: Pertinent items are noted in the History of Present Illness.    Objective:     Vitals:  There were no vitals filed for this visit.       Gen: Alert, NAD   Lungs:  Respiratory non labored.    Heart:  Regular rate per monitor   Abd: Soft, non-tender. Gravid.   Genital: Deferred   Msk: No LE edema. No calf tenderness.      NST:  Baseline : 130 bpm  Variability: moderate  Accels: present  Decels: none  Rufus: isolated ctx      Impression/Plan:     Principal Problem:    History of IUFD  Resolved Problems:    * No resolved hospital problems. *       Ms.

## 2025-03-18 NOTE — DISCHARGE INSTRUCTIONS
Postpartum Support Groups   We know that all of us are dealing with a tremendous amount of uncertainty, confusion and disruption to our daily lives, which may result in increased anxiety, depression and fear. If you are feeling unsettled or worse, please know that we are here to help. During this time of increased caution and care for one another, [facility] [place] ([facility]) is offering virtual and in person support groups to ALL MOTHERS in [place] regardless of the age of your child/children as a way to help weather this emotional storm together. Social support is an important part of self-care during this time of physical distancing.  Virtual postpartum support group meetings available at www.postpartumva.org  Warm Line: 627.370.2656    Breastfeeding Support Groups   1st and 3rd Wednesday of each month at [facility] [place]  2nd and 4th Tuesday of each month at [facility] (in education center behind cafeteria)    [email or web address]-[email or web address]       Depression After Childbirth: Care Instructions  It's common to lose sleep, feel irritable, and cry easily during the first few days after childbirth. Hormone changes and the demands of a new baby can cause these \"baby blues.\" If these mood changes last more than 2 weeks, you may have postpartum depression. This is a medical condition that requires treatment.  If you have any of these signs, you may be depressed. See your doctor right away.    You feel very sad or hopeless and lose interest in daily activities.       You sleep too much or not enough.       You feel tired or as if you have no energy.       You eat too much or too little.       You write or talk about death.     Tips to help with postpartum depression        What you can do   Try to go to all of your counseling sessions.  Take medicines as directed.  Eat healthy foods.  Get daily exercise, such as walks.  Try to get some sunlight every day.  Avoid using

## 2025-03-18 NOTE — PROGRESS NOTES
BRIEF NOTE    Called by RN d/t dressing moderately saturated with bloody drainage on it. Presented to bedside. CHARLEY dressing about 50% saturated with bright red blood; dressing removed. Incision inspected and no active bleeding noted although skin edge on mid and right aspect of incision was wet; no bloody drainage expressed from incision. Fundus firm and below the level of the umbilicus. Pressure dressing placed, plan to keep on for at least 24h.    Electronically signed:  Shayna Savage DO  3/18/2025 1:16 PM

## 2025-03-18 NOTE — PROGRESS NOTES
1400 - Bedside report given to KELVIN Saldaña RN (oncoming nurse) by ROBERT Curran RN (offgoing nurse). Report included the following information Nurse Handoff Report.

## 2025-03-19 LAB
ERYTHROCYTE [DISTWIDTH] IN BLOOD BY AUTOMATED COUNT: 13.8 % (ref 11.5–14.5)
HCT VFR BLD AUTO: 32.1 % (ref 35–47)
HGB BLD-MCNC: 10.1 G/DL (ref 11.5–16)
MCH RBC QN AUTO: 28.1 PG (ref 26–34)
MCHC RBC AUTO-ENTMCNC: 31.5 G/DL (ref 30–36.5)
MCV RBC AUTO: 89.2 FL (ref 80–99)
NRBC # BLD: 0 K/UL (ref 0–0.01)
NRBC BLD-RTO: 0 PER 100 WBC
PLATELET # BLD AUTO: 122 K/UL (ref 150–400)
PMV BLD AUTO: 10.1 FL (ref 8.9–12.9)
RBC # BLD AUTO: 3.6 M/UL (ref 3.8–5.2)
WBC # BLD AUTO: 5.2 K/UL (ref 3.6–11)

## 2025-03-19 PROCEDURE — 6370000000 HC RX 637 (ALT 250 FOR IP): Performed by: STUDENT IN AN ORGANIZED HEALTH CARE EDUCATION/TRAINING PROGRAM

## 2025-03-19 PROCEDURE — 1120000000 HC RM PRIVATE OB

## 2025-03-19 PROCEDURE — 85027 COMPLETE CBC AUTOMATED: CPT

## 2025-03-19 PROCEDURE — 2580000003 HC RX 258: Performed by: OBSTETRICS & GYNECOLOGY

## 2025-03-19 PROCEDURE — 6360000002 HC RX W HCPCS: Performed by: STUDENT IN AN ORGANIZED HEALTH CARE EDUCATION/TRAINING PROGRAM

## 2025-03-19 RX ORDER — 0.9 % SODIUM CHLORIDE 0.9 %
500 INTRAVENOUS SOLUTION INTRAVENOUS ONCE
Status: COMPLETED | OUTPATIENT
Start: 2025-03-19 | End: 2025-03-19

## 2025-03-19 RX ADMIN — ACETAMINOPHEN 1000 MG: 500 TABLET ORAL at 18:07

## 2025-03-19 RX ADMIN — SODIUM CHLORIDE 500 ML: 0.9 INJECTION, SOLUTION INTRAVENOUS at 11:02

## 2025-03-19 RX ADMIN — SIMETHICONE 80 MG: 80 TABLET, CHEWABLE ORAL at 23:53

## 2025-03-19 RX ADMIN — ACETAMINOPHEN 1000 MG: 500 TABLET ORAL at 05:21

## 2025-03-19 RX ADMIN — DOCUSATE SODIUM 100 MG: 100 CAPSULE, LIQUID FILLED ORAL at 20:29

## 2025-03-19 RX ADMIN — KETOROLAC TROMETHAMINE 30 MG: 30 INJECTION, SOLUTION INTRAMUSCULAR at 06:17

## 2025-03-19 RX ADMIN — DOCUSATE SODIUM 100 MG: 100 CAPSULE, LIQUID FILLED ORAL at 08:57

## 2025-03-19 RX ADMIN — KETOROLAC TROMETHAMINE 30 MG: 30 INJECTION, SOLUTION INTRAMUSCULAR at 00:56

## 2025-03-19 RX ADMIN — SIMETHICONE 80 MG: 80 TABLET, CHEWABLE ORAL at 18:26

## 2025-03-19 RX ADMIN — OXYCODONE 5 MG: 5 TABLET ORAL at 18:08

## 2025-03-19 RX ADMIN — IBUPROFEN 800 MG: 400 TABLET, FILM COATED ORAL at 12:51

## 2025-03-19 RX ADMIN — IBUPROFEN 800 MG: 400 TABLET, FILM COATED ORAL at 20:29

## 2025-03-19 RX ADMIN — OXYCODONE 5 MG: 5 TABLET ORAL at 08:57

## 2025-03-19 NOTE — PROGRESS NOTES
Post-Operative  Day 1    Rohit Monaco     Assessment:   Post-Op day 1, stable  Incisional oozing on POD0, pressure dressing applied and bleeding now resolved -> will remove today; Hgb 11.9-> 10.1  H/o pp depression, needs 1-2 wk mood check  H/o migraines  GDMA1 -> 2hr gtt pp  Kazakh speaking    Plan:     - Routine post-operative care.  - Remove pressure dressing today  - Postop hemoglobin stable.  Plan to start Fe at discharge if pt anemic.  - Ambulate today.      Information for the patient's :  Carlos Enrique, Vinnie Bee [[id number]]   , Low Transverse  Patient doing well without significant complaint.  Tolerating diet.  Fernando out.  Ambulating.      Vitals:  /63   Pulse 74   Temp 98 °F (36.7 °C) (Oral)   Resp 16   SpO2 100%   Breastfeeding Unknown   Temp (24hrs), Av.9 °F (36.6 °C), Min:97.5 °F (36.4 °C), Max:98.2 °F (36.8 °C)      Last 24hr Input/Output:    Intake/Output Summary (Last 24 hours) at 3/19/2025 0837  Last data filed at 3/18/2025 2120  Gross per 24 hour   Intake 1000 ml   Output 1155 ml   Net -155 ml          Exam:     Patient without distress.               Fundus firm, nontender per nursing fundal checks.  Incision bandaged, clean, dry, intact.              Perineum with normal lochia noted per nursing assessment.              Lower extremities are negative for pathological edema.    Labs:   Lab Results   Component Value Date/Time    WBC 5.2 2025 05:41 AM    WBC 4.6 2025 10:51 AM    HGB 10.1 2025 05:41 AM    HGB 11.9 2025 10:51 AM    HCT 32.1 2025 05:41 AM    HCT 34.5 2025 10:51 AM     2025 05:41 AM     2025 10:51 AM       Recent Results (from the past 24 hours)   CBC    Collection Time: 25  5:41 AM   Result Value Ref Range    WBC 5.2 3.6 - 11.0 K/uL    RBC 3.60 (L) 3.80 - 5.20 M/uL    Hemoglobin 10.1 (L) 11.5 - 16.0 g/dL    Hematocrit 32.1 (L) 35.0 - 47.0 %    MCV 89.2 80.0 - 99.0 FL    MCH  28.1 26.0 - 34.0 PG    MCHC 31.5 30.0 - 36.5 g/dL    RDW 13.8 11.5 - 14.5 %    Platelets 122 (L) 150 - 400 K/uL    MPV 10.1 8.9 - 12.9 FL    Nucleated RBCs 0.0 0  WBC    nRBC 0.00 0.00 - 0.01 K/uL

## 2025-03-19 NOTE — PROGRESS NOTES
Pressure dressing removed by me at bedside.  Dressing gauze saturated with old blood however incision is visualized and examined and is CDI.  No active bleeding noted.  Incision was cleaned with alcohol swabs and allowed to dry.  A CHARLEY dressing was placed.  The patient tolerated well and will return in 1 wk to office for removal and mood check.    Elsi Rai MD

## 2025-03-20 VITALS
HEART RATE: 83 BPM | TEMPERATURE: 98.5 F | SYSTOLIC BLOOD PRESSURE: 116 MMHG | RESPIRATION RATE: 16 BRPM | OXYGEN SATURATION: 97 % | DIASTOLIC BLOOD PRESSURE: 86 MMHG

## 2025-03-20 PROBLEM — O43.193 MARGINAL INSERTION OF UMBILICAL CORD AFFECTING MANAGEMENT OF MOTHER IN THIRD TRIMESTER: Status: RESOLVED | Noted: 2025-03-18 | Resolved: 2025-03-20

## 2025-03-20 PROBLEM — O24.410 DIET CONTROLLED GESTATIONAL DIABETES MELLITUS (GDM) IN THIRD TRIMESTER: Status: RESOLVED | Noted: 2025-03-18 | Resolved: 2025-03-20

## 2025-03-20 PROCEDURE — 6370000000 HC RX 637 (ALT 250 FOR IP): Performed by: STUDENT IN AN ORGANIZED HEALTH CARE EDUCATION/TRAINING PROGRAM

## 2025-03-20 RX ORDER — IBUPROFEN 800 MG/1
800 TABLET, FILM COATED ORAL EVERY 8 HOURS
Qty: 120 TABLET | Refills: 3 | Status: SHIPPED | OUTPATIENT
Start: 2025-03-20

## 2025-03-20 RX ORDER — OXYCODONE HYDROCHLORIDE 5 MG/1
5 TABLET ORAL EVERY 4 HOURS PRN
Qty: 20 TABLET | Refills: 0 | Status: SHIPPED | OUTPATIENT
Start: 2025-03-20 | End: 2025-03-23

## 2025-03-20 RX ORDER — PSEUDOEPHEDRINE HCL 30 MG
100 TABLET ORAL 2 TIMES DAILY PRN
Qty: 60 CAPSULE | Refills: 1 | Status: SHIPPED | OUTPATIENT
Start: 2025-03-20

## 2025-03-20 RX ADMIN — ACETAMINOPHEN 1000 MG: 500 TABLET ORAL at 10:06

## 2025-03-20 RX ADMIN — ACETAMINOPHEN 1000 MG: 500 TABLET ORAL at 02:28

## 2025-03-20 RX ADMIN — IBUPROFEN 800 MG: 400 TABLET, FILM COATED ORAL at 10:06

## 2025-03-20 RX ADMIN — DOCUSATE SODIUM 100 MG: 100 CAPSULE, LIQUID FILLED ORAL at 10:06

## 2025-03-20 NOTE — LACTATION NOTE
This note was copied from a baby's chart.  Baby nursing well and has improved throughout post partum stay, deep latch maintained, mother is comfortable, milk is in transition, baby feeding vigorously with rhythmic suck, swallow, breathe pattern, with audible swallowing, and evident milk transfer, both breasts offered, baby is asleep following feeding. Baby is feeding on demand, voiding and stools present as appropriate since birth. Weight loss:  7.26%    Breasts may become engorged when milk \"comes in\".  How milk is made / normal phases of milk production, supply and demand discussed.  Taught care of engorged breasts - frequent breastfeeding encouraged and breast massage ac. Then nurse the baby (or pump minimally for comfort). Apply cold compresses ac and/or pc x 15 minutes a few times a day for swelling or discomfort if necessary.  May need to do this care for a couple of days.Discussed prevention and treatment of mastitis.

## 2025-03-20 NOTE — PROGRESS NOTES
0745:Bedside and Verbal shift change report given to Jovanni RN (oncoming nurse) by Frandy COELLO (offgoing nurse). Report included the following information Nurse Handoff Report.     1240: Discharge instructions reviewed with pt and her  (patient declined  services). All questions answered.

## 2025-03-20 NOTE — DISCHARGE SUMMARY
Obstetrical Discharge Summary     Name: Rohit Monaco MRN: [id number]  SSN: [id number]    YOB: 1990  Age: 34 y.o.  Sex: female      Admit Date: 3/18/2025    Discharge Date: 3/20/2025     Admitting Physician: Shayna Savage DO     Attending Physician:  Shi Storm MD     Admission Diagnoses: Delivery by  section using transverse incision of lower segment of uterus [O82]  History of IUFD [Z87.59]    Discharge Diagnoses:   Information for the patient's :  Vinnie Monaco [[id number]]   @[id number]@     Additional Diagnoses:  No components found for: \"OBEXTABORH\", \"OBEXTABSCRN\", \"OBEXTRUBELLA\", \"OBEXTGRBS\"    Hospital Course: Normal hospital course following the delivery.    Patient Instructions:   Current Discharge Medication List        START taking these medications    Details   oxyCODONE (ROXICODONE) 5 MG immediate release tablet Take 1 tablet by mouth every 4 hours as needed for Pain for up to 3 days. Max Daily Amount: 30 mg  Qty: 20 tablet, Refills: 0    Comments: Reduce doses taken as pain becomes manageable  Associated Diagnoses: Delivery by  section using transverse incision of lower segment of uterus      ibuprofen (ADVIL;MOTRIN) 800 MG tablet Take 1 tablet by mouth in the morning and 1 tablet at noon and 1 tablet in the evening.  Qty: 120 tablet, Refills: 3      docusate sodium (COLACE, DULCOLAX) 100 MG CAPS Take 100 mg by mouth 2 times daily as needed for Constipation  Qty: 60 capsule, Refills: 1             Disposition at Discharge: Home or self care    Condition at Discharge: Stable    Reference my discharge instructions.    No follow-ups on file.     Signed By:  Renée Tam MD     2025

## 2025-03-20 NOTE — PROGRESS NOTES
Post-Operative  Day 2    Rohit Monaco     Assessment: Post-Op day 2, doing well    Incisional oozing on POD0, pressure dressing applied and bleeding now resolved -> CHARLEY placed POD1. Hgb 11.9-> 10.1  H/o pp depression, needs 1-2 wk mood check  H/o migraines  GDMA1 -> 2hr gtt pp  Latvian speaking    Plan:   - Routine post-operative care.  - Plan for discharge Today.    Information for the patient's :  Carlos Enrique, Vinnie Bee [[id number]]   , Low Transverse  Patient doing well without significant complaint. Tolerating regular diet.  Ambulating.  Voiding without difficulty.    Vitals:  /69   Pulse 79   Temp 98 °F (36.7 °C) (Oral)   Resp 16   SpO2 99%   Breastfeeding Unknown   Temp (24hrs), Av.1 °F (36.7 °C), Min:97.7 °F (36.5 °C), Max:98.6 °F (37 °C)        Exam:       Patient without distress.                 Fundus firm, nontender per nursing fundal checks.     Incision bandaged. Clean, dry, intact.                Perineum with normal lochia noted per nursing assessment.                Lower extremities are negative for pathological edema.    Labs:   Lab Results   Component Value Date/Time    WBC 5.2 2025 05:41 AM    WBC 4.6 2025 10:51 AM    HGB 10.1 2025 05:41 AM    HGB 11.9 2025 10:51 AM    HCT 32.1 2025 05:41 AM    HCT 34.5 2025 10:51 AM     2025 05:41 AM     2025 10:51 AM       No results found for this or any previous visit (from the past 24 hours).

## (undated) DEVICE — KENDALL SCD EXPRESS SLEEVES, KNEE LENGTH, MEDIUM: Brand: KENDALL SCD

## (undated) DEVICE — SUTURE MONOCRYL SZ 3-0 L27IN ABSRB UD L60MM KS STR REV CUT Y523H

## (undated) DEVICE — SUTURE VICRYL SZ 1 L36IN ABSRB VLT L36MM CT-1 1/2 CIR J347H

## (undated) DEVICE — Z DISCONTINUED PACK PROCEDURE SURG C SECT KT SMH

## (undated) DEVICE — SPROTTE TRAY 24G X 4'' (103MM) NEEDLE WITH 40MM INTRODUCER (SPINAL)

## (undated) DEVICE — APPLICATOR MEDICATED 26 CC SOLUTION HI LT ORNG CHLORAPREP

## (undated) DEVICE — PICO 7 10CM X 30CM: Brand: PICO™ 7

## (undated) DEVICE — COVERALLS PROTCT 2XL WHT SMS ANTISTATIC PREM KNIT CUF FULL

## (undated) DEVICE — Z DUP USE 2271313 SOLIDIFIER FLUID 3000 CC ABSORB

## (undated) DEVICE — ATTACHMENT SMK 3/8INX10FT VALLEYLAB

## (undated) DEVICE — CANISTER, RIGID, 3000CC: Brand: MEDLINE INDUSTRIES, INC.

## (undated) DEVICE — PENCIL SMK EVAC 10 FT BLADE ELECTRD ROCKER FOR TELSCP

## (undated) DEVICE — SOLUTION IRRIG 1000ML 0.9% SOD CHL USP POUR PLAS BTL

## (undated) DEVICE — SUTURE MONOCRYL SZ 0 L36IN ABSRB UD L36MM CT-1 1/2 CIR Y946H

## (undated) DEVICE — DRESSING SIL W4XL5IN ANTIBACT GELLING FBR CYTOFORM

## (undated) DEVICE — 3000CC GUARDIAN II: Brand: GUARDIAN

## (undated) DEVICE — GLOVE SURG SZ 65 L12IN FNGR THK79MIL GRN LTX FREE

## (undated) DEVICE — SOLUTION IRRIG 1000ML STRL H2O USP PLAS POUR BTL

## (undated) DEVICE — SUTURE VICRYL SZ 2-0 L36IN ABSRB VLT L36MM CT-1 1/2 CIR J345H

## (undated) DEVICE — SUTURE VICRYL + SZ 0 L36IN ABSRB VLT L40MM CT 1/2 CIR TAPR VCP358H

## (undated) DEVICE — GLOVE SURG SZ 6 THK91MIL LTX FREE SYN POLYISOPRENE ANTI

## (undated) DEVICE — 1200 GUARD II KIT W/5MM TUBE W/O VAC TUBE: Brand: GUARDIAN